# Patient Record
Sex: FEMALE | Race: BLACK OR AFRICAN AMERICAN | NOT HISPANIC OR LATINO | Employment: OTHER | URBAN - METROPOLITAN AREA
[De-identification: names, ages, dates, MRNs, and addresses within clinical notes are randomized per-mention and may not be internally consistent; named-entity substitution may affect disease eponyms.]

---

## 2017-06-26 PROBLEM — R55 MICTURITION SYNCOPE: Status: ACTIVE | Noted: 2017-06-26

## 2017-06-26 PROBLEM — R55 VASOVAGAL SYNCOPE: Status: ACTIVE | Noted: 2017-06-26

## 2019-04-04 ENCOUNTER — PATIENT OUTREACH (OUTPATIENT)
Dept: ADMINISTRATIVE | Facility: HOSPITAL | Age: 74
End: 2019-04-04

## 2019-04-30 ENCOUNTER — OFFICE VISIT (OUTPATIENT)
Dept: FAMILY MEDICINE | Facility: CLINIC | Age: 74
End: 2019-04-30
Payer: MEDICARE

## 2019-04-30 ENCOUNTER — TELEPHONE (OUTPATIENT)
Dept: FAMILY MEDICINE | Facility: CLINIC | Age: 74
End: 2019-04-30

## 2019-04-30 ENCOUNTER — LAB VISIT (OUTPATIENT)
Dept: LAB | Facility: HOSPITAL | Age: 74
End: 2019-04-30
Attending: FAMILY MEDICINE
Payer: MEDICARE

## 2019-04-30 VITALS
SYSTOLIC BLOOD PRESSURE: 190 MMHG | HEIGHT: 66 IN | WEIGHT: 152.31 LBS | DIASTOLIC BLOOD PRESSURE: 112 MMHG | TEMPERATURE: 97 F | BODY MASS INDEX: 24.48 KG/M2 | RESPIRATION RATE: 20 BRPM

## 2019-04-30 DIAGNOSIS — E78.5 HLD (HYPERLIPIDEMIA): ICD-10-CM

## 2019-04-30 DIAGNOSIS — I10 HYPERTENSION, UNSPECIFIED TYPE: ICD-10-CM

## 2019-04-30 DIAGNOSIS — E27.9 ADRENAL ABNORMALITY: ICD-10-CM

## 2019-04-30 DIAGNOSIS — Z78.0 MENOPAUSE: ICD-10-CM

## 2019-04-30 DIAGNOSIS — I10 ESSENTIAL HYPERTENSION: ICD-10-CM

## 2019-04-30 DIAGNOSIS — I95.1 ORTHOSTATIC HYPOTENSION: ICD-10-CM

## 2019-04-30 DIAGNOSIS — J45.21 ASTHMA, CHRONIC, MILD INTERMITTENT, WITH ACUTE EXACERBATION: ICD-10-CM

## 2019-04-30 DIAGNOSIS — G90.3 ORTHOSTATIC HYPOTENSION DUE TO PARKINSON'S DISEASE: ICD-10-CM

## 2019-04-30 DIAGNOSIS — G20.A1 PARKINSON DISEASE: ICD-10-CM

## 2019-04-30 DIAGNOSIS — R79.9 ABNORMAL FINDING OF BLOOD CHEMISTRY: ICD-10-CM

## 2019-04-30 DIAGNOSIS — E27.9 ADRENAL ABNORMALITY: Primary | ICD-10-CM

## 2019-04-30 LAB
ALBUMIN SERPL BCP-MCNC: 4 G/DL (ref 3.5–5.2)
ALP SERPL-CCNC: 64 U/L (ref 55–135)
ALT SERPL W/O P-5'-P-CCNC: <5 U/L (ref 10–44)
ANION GAP SERPL CALC-SCNC: 11 MMOL/L (ref 8–16)
AST SERPL-CCNC: 16 U/L (ref 10–40)
BASOPHILS # BLD AUTO: 0.04 K/UL (ref 0–0.2)
BASOPHILS NFR BLD: 0.7 % (ref 0–1.9)
BILIRUB SERPL-MCNC: 0.4 MG/DL (ref 0.1–1)
BUN SERPL-MCNC: 22 MG/DL (ref 8–23)
CALCIUM SERPL-MCNC: 9.5 MG/DL (ref 8.7–10.5)
CHLORIDE SERPL-SCNC: 106 MMOL/L (ref 95–110)
CHOLEST SERPL-MCNC: 205 MG/DL (ref 120–199)
CHOLEST/HDLC SERPL: 3.1 {RATIO} (ref 2–5)
CO2 SERPL-SCNC: 23 MMOL/L (ref 23–29)
CREAT SERPL-MCNC: 1 MG/DL (ref 0.5–1.4)
DIFFERENTIAL METHOD: NORMAL
EOSINOPHIL # BLD AUTO: 0.1 K/UL (ref 0–0.5)
EOSINOPHIL NFR BLD: 2 % (ref 0–8)
ERYTHROCYTE [DISTWIDTH] IN BLOOD BY AUTOMATED COUNT: 14.3 % (ref 11.5–14.5)
ERYTHROCYTE [SEDIMENTATION RATE] IN BLOOD BY WESTERGREN METHOD: 6 MM/HR (ref 0–36)
EST. GFR  (AFRICAN AMERICAN): >60 ML/MIN/1.73 M^2
EST. GFR  (NON AFRICAN AMERICAN): 55.2 ML/MIN/1.73 M^2
ESTIMATED AVG GLUCOSE: 111 MG/DL (ref 68–131)
GLUCOSE SERPL-MCNC: 82 MG/DL (ref 70–110)
HBA1C MFR BLD HPLC: 5.5 % (ref 4–5.6)
HCT VFR BLD AUTO: 38 % (ref 37–48.5)
HDLC SERPL-MCNC: 67 MG/DL (ref 40–75)
HDLC SERPL: 32.7 % (ref 20–50)
HGB BLD-MCNC: 12.4 G/DL (ref 12–16)
IMM GRANULOCYTES # BLD AUTO: 0.01 K/UL (ref 0–0.04)
IMM GRANULOCYTES NFR BLD AUTO: 0.2 % (ref 0–0.5)
LDLC SERPL CALC-MCNC: 123.6 MG/DL (ref 63–159)
LYMPHOCYTES # BLD AUTO: 2.1 K/UL (ref 1–4.8)
LYMPHOCYTES NFR BLD: 36.9 % (ref 18–48)
MCH RBC QN AUTO: 30 PG (ref 27–31)
MCHC RBC AUTO-ENTMCNC: 32.6 G/DL (ref 32–36)
MCV RBC AUTO: 92 FL (ref 82–98)
MONOCYTES # BLD AUTO: 0.6 K/UL (ref 0.3–1)
MONOCYTES NFR BLD: 10.1 % (ref 4–15)
NEUTROPHILS # BLD AUTO: 2.8 K/UL (ref 1.8–7.7)
NEUTROPHILS NFR BLD: 50.1 % (ref 38–73)
NONHDLC SERPL-MCNC: 138 MG/DL
NRBC BLD-RTO: 0 /100 WBC
PLATELET # BLD AUTO: 243 K/UL (ref 150–350)
PMV BLD AUTO: 10.4 FL (ref 9.2–12.9)
POTASSIUM SERPL-SCNC: 4.2 MMOL/L (ref 3.5–5.1)
PROT SERPL-MCNC: 7.5 G/DL (ref 6–8.4)
RBC # BLD AUTO: 4.13 M/UL (ref 4–5.4)
SODIUM SERPL-SCNC: 140 MMOL/L (ref 136–145)
TRIGL SERPL-MCNC: 72 MG/DL (ref 30–150)
TSH SERPL DL<=0.005 MIU/L-ACNC: 1.35 UIU/ML (ref 0.4–4)
WBC # BLD AUTO: 5.55 K/UL (ref 3.9–12.7)

## 2019-04-30 PROCEDURE — 1100F PTFALLS ASSESS-DOCD GE2>/YR: CPT | Mod: CPTII,S$GLB,, | Performed by: FAMILY MEDICINE

## 2019-04-30 PROCEDURE — 80061 LIPID PANEL: CPT

## 2019-04-30 PROCEDURE — 85025 COMPLETE CBC W/AUTO DIFF WBC: CPT

## 2019-04-30 PROCEDURE — 1100F PR PT FALLS ASSESS DOC 2+ FALLS/FALL W/INJURY/YR: ICD-10-PCS | Mod: CPTII,S$GLB,, | Performed by: FAMILY MEDICINE

## 2019-04-30 PROCEDURE — 3077F SYST BP >= 140 MM HG: CPT | Mod: CPTII,S$GLB,, | Performed by: FAMILY MEDICINE

## 2019-04-30 PROCEDURE — 82533 TOTAL CORTISOL: CPT

## 2019-04-30 PROCEDURE — 3080F DIAST BP >= 90 MM HG: CPT | Mod: CPTII,S$GLB,, | Performed by: FAMILY MEDICINE

## 2019-04-30 PROCEDURE — 80053 COMPREHEN METABOLIC PANEL: CPT

## 2019-04-30 PROCEDURE — 99213 PR OFFICE/OUTPT VISIT, EST, LEVL III, 20-29 MIN: ICD-10-PCS | Mod: S$GLB,,, | Performed by: FAMILY MEDICINE

## 2019-04-30 PROCEDURE — 85652 RBC SED RATE AUTOMATED: CPT

## 2019-04-30 PROCEDURE — 3288F FALL RISK ASSESSMENT DOCD: CPT | Mod: CPTII,S$GLB,, | Performed by: FAMILY MEDICINE

## 2019-04-30 PROCEDURE — 99999 PR PBB SHADOW E&M-EST. PATIENT-LVL IV: CPT | Mod: PBBFAC,,, | Performed by: FAMILY MEDICINE

## 2019-04-30 PROCEDURE — 82088 ASSAY OF ALDOSTERONE: CPT

## 2019-04-30 PROCEDURE — 3288F PR FALLS RISK ASSESSMENT DOCUMENTED: ICD-10-PCS | Mod: CPTII,S$GLB,, | Performed by: FAMILY MEDICINE

## 2019-04-30 PROCEDURE — 86038 ANTINUCLEAR ANTIBODIES: CPT

## 2019-04-30 PROCEDURE — 83036 HEMOGLOBIN GLYCOSYLATED A1C: CPT

## 2019-04-30 PROCEDURE — 3077F PR MOST RECENT SYSTOLIC BLOOD PRESSURE >= 140 MM HG: ICD-10-PCS | Mod: CPTII,S$GLB,, | Performed by: FAMILY MEDICINE

## 2019-04-30 PROCEDURE — 84244 ASSAY OF RENIN: CPT

## 2019-04-30 PROCEDURE — 99213 OFFICE O/P EST LOW 20 MIN: CPT | Mod: S$GLB,,, | Performed by: FAMILY MEDICINE

## 2019-04-30 PROCEDURE — 3080F PR MOST RECENT DIASTOLIC BLOOD PRESSURE >= 90 MM HG: ICD-10-PCS | Mod: CPTII,S$GLB,, | Performed by: FAMILY MEDICINE

## 2019-04-30 PROCEDURE — 84443 ASSAY THYROID STIM HORMONE: CPT

## 2019-04-30 PROCEDURE — 99999 PR PBB SHADOW E&M-EST. PATIENT-LVL IV: ICD-10-PCS | Mod: PBBFAC,,, | Performed by: FAMILY MEDICINE

## 2019-04-30 RX ORDER — HYDRALAZINE HYDROCHLORIDE 10 MG/1
10 TABLET, FILM COATED ORAL 3 TIMES DAILY
Qty: 90 TABLET | Refills: 11 | Status: SHIPPED | OUTPATIENT
Start: 2019-04-30 | End: 2019-05-31

## 2019-04-30 RX ORDER — ATORVASTATIN CALCIUM 20 MG/1
TABLET, FILM COATED ORAL
Qty: 30 TABLET | Refills: 6 | Status: SHIPPED | OUTPATIENT
Start: 2019-04-30 | End: 2019-12-08 | Stop reason: SDUPTHER

## 2019-04-30 RX ORDER — CARBIDOPA AND LEVODOPA 25; 100 MG/1; MG/1
TABLET ORAL
Refills: 3 | COMMUNITY
Start: 2019-02-20 | End: 2019-08-12

## 2019-04-30 NOTE — PATIENT INSTRUCTIONS
Measure bp before taking BP med if sys<140 don't take the med.  3x a day  To determine if med is needed  Treatment for Vasovagal Syncope  Vasovagal syncope is fainting caused by a complex nerve and blood vessel reaction in the body. Its the most common cause of fainting. Unlike other causes of fainting, its not a sign of a problem with the heart or brain.     How to say it  [id number]  SINK-o-pee   Types of treatment  If you have had episodes of vasovagal syncope, your health care provider may tell you how to help prevent fainting. These might include:  · Avoiding known triggers, such as standing for a long time or getting too hot  · Stopping medicines that lower blood pressure, such as diuretics  · Eating foods with more salt, to help keep up blood volume  · Drinking plenty of fluids, to maintain blood volume  · Doing moderate exercise such as lower leg strength training  · Wearing support stockings to prevent blood pooling in the legs while standing  In some case, your health care provider may prescribe a medicine to help control vasovagal syncope. Medicine may or may not work. Your health care provider may have you try one of the below:  · Alpha-1-adrenergic agonist, to increase your blood pressure such as midodrine  · Corticosteroids, to help increase your sodium and fluid levels such as fludrocortisone  · Serotonin reuptake inhibitors (SSRIs), to manage your nervous system response  If these medicines dont work for you, your health care provider may advise orthostatic training. This method uses a tilt table to gradually increase the amount of time you are upright. In rare cases you may need a cardiac pacemaker to prevent ongoing fainting.  Avoiding triggers  To help reduce the risk of fainting, try to avoid triggers such as:  · Standing for long periods  · Too much heat  · Intense emotion, such as fear  · Intense pain  · The sight of blood or a needle  · Exercising for a long time  Living with vasovagal  syncope  Try to avoid situations that put you at risk and stay well hydrated. Do not skip meals.  Watch for the warning signs of vasovagal syncope. These can include:  · Nausea  · Warm, flushed feeling  · Face that turns pale  · Sweaty palms  · Feeling dizzy  · Blurred vision  If you think you are about to faint, try one or more of these tips:  · Lie down right away.  · Prop your feet up so that they are higher than your head.  · Tense up your arms.  · Cross your legs.  If you faint, once you regain consciousness you should rest for a little while before moving around again.  Possible complications of vasovagal syncope  Fainting can be dangerous if it happens at certain times, like while driving. If you have chronic syncope that is not under control, your health care provider may advise you not to drive. This is especially important if you dont have warning signs before you faint. Talk with your health care provider about whats safe for you to do.     When to call your healthcare provider  Call your health care provider if you have fainting that occurs more often  or if you sustain significant injury from your fainting spell.  Unexplained syncope or fainting, especially in older people, can actually be signs of a serious life threatening condition such as a heart attack. Call 911 or seek immediate medical care if the cause of syncope is not known.  Do not drive yourself to the emergency department if you have had a syncopal episode to prevent injury to yourself or other passengers or drivers in the event another episode occurs while you are behind the wheel of a car.   Date Last Reviewed: 2/1/2017 © 2000-2017 [employer]. [address] [place] [address], [place] [address]. All rights reserved. This information is not intended as a substitute for professional medical care. Always follow your healthcare professional's instructions.        Understanding Vasovagal Syncope  Vasovagal syncope is fainting caused by  a complex nerve and blood vessel reaction in the body. Its the most common cause of fainting. Unlike other causes of fainting, its not a sign of a problem with the heart or brain.     How to say it  [id number]-trino  SINK-o-pee   How vasovagal syncope happens  Many nerves connect with your heart and blood vessels. These nerves help control the speed and force of your heartbeat. They also regulate blood pressure. They control whether your blood vessels should be more open or more closed.  Usually these nerves work together so you always get enough blood to your brain. In certain cases, these nerves may give a wrong signal. This may cause your blood vessels to open wide. At the same time, your heartbeat slows down. Blood can start to pool in your legs, and not enough of it may reach the brain. If that happens, you may briefly lose consciousness. When you lie down or fall down, blood flow to the brain resumes.  What causes vasovagal syncope?  Many triggers can cause vasovagal syncope, such as:  · Standing for long periods  · Too much heat  · Intense emotion, such as fear  · Intense pain  · The sight of blood or a needle  · Exercising for a long time  Older adults may have additional triggers, such as:  · Urinating  · Swallowing  · Coughing  · Having a bowel movement  Symptoms of vasovagal syncope  Fainting is the main symptom of vasovagal syncope. You may have symptoms before fainting such as:  · Nausea  · Warm, flushed feeling  · Face that turns pale  · Sweaty palms  · Feeling dizzy  · Blurred vision  If you lie down at the first sign of these symptoms, you will often be able to prevent fainting. Not everyone notices symptoms before fainting, however.  When a person does faint, lying down restores blood flow to the brain. Consciousness should return fairly quickly. You might not feel normal for a little while after you faint. You might feel depressed or fatigued for a short time. You may even feel nauseous afterwards  and vomit.  Some people have only 1 or 2 episodes of vasovagal syncope in their life. For others, it happens more often and with no warning.  Diagnosing vasovagal syncope  Your healthcare provider will ask about your health history and your symptoms. He or she will give you a physical exam. Your blood pressure may be measured while lying down, seated, and standing. You may also have an electrocardiogram (ECG). This is a simple test that looks at the hearts rhythm.  You may be checked for other possible causes of fainting. You may have other tests such as:  · Continuous portable ECG monitoring, to look at heart rhythms over time, such as with a holter or event monitor  · Echocardiogram, to look at the blood flow in the heart and the hearts motion  · Exercise stress testing, to see how your heart does during exercise  · Blood tests to check for signs of disease  If these tests are normal, you may have a tilt table test. For this test, you lie down on a platform. Your heart rate and blood pressure are measured while you are lying down. The platform is then tilted upright. Your heart rate and blood pressure are measured again. If you have vasovagal syncope, you may faint during the upward tilt. Sometimes medicines that increase heart rate and the force of heart contractions are used to try and provoke a syncopal episode.  There are many causes of syncope. Some causes are not dangerous. In older persons, unexplained syncope can be a sign of a serious infection or a heart attack. Call 911 or seek immediate medical attention to be evaluated, especially if there has been a fall and injury with the syncope. You should not drive yourself to the hospital or emergency department after a syncopal episode for the safety of yourself or other drivers and passengers. Have someone drive you. Your provider may restrict your driving until the cause of the syncope is better understood and to make sure the syncope does not become  chronic or if it is unpredictable.  Date Last Reviewed: 3/1/2017  © 3667-4915 [email or web address]. [address] [place] [address], [place] [address]. All rights reserved. This information is not intended as a substitute for professional medical care. Always follow your healthcare professional's instructions.

## 2019-04-30 NOTE — TELEPHONE ENCOUNTER
Please call pt to schedule with Endocrinology, Neurology, DEXA Scan, and a 2 month follow up with Dr. James.

## 2019-05-01 LAB
ANA SER QL IF: NORMAL
CORTIS SERPL-MCNC: 3.2 UG/DL

## 2019-05-01 RX ORDER — ATORVASTATIN CALCIUM 20 MG/1
TABLET, FILM COATED ORAL
Qty: 30 TABLET | Refills: 6 | Status: CANCELLED | OUTPATIENT
Start: 2019-05-01

## 2019-05-01 NOTE — PROGRESS NOTES
Madeline Reilly is a 73 y.o. female   Routine physical  Source of history: Patient  Past Medical History:   Diagnosis Date    Asthma, chronic     Orthostatic hypotension      Patient  reports that she has never smoked. She has never used smokeless tobacco. She reports that she does not drink alcohol or use drugs.  No family history on file.  ROS:  GENERAL: No fever, chills, fatigability or weight loss.  SKIN: No rashes, itching or changes in color or texture of skin.  HEAD: No headaches or recent head trauma.  EYES: Visual acuity fine. No photophobia, ocular pain or diplopia.  EARS: Denies ear pain, discharge or vertigo.  NOSE: No loss of smell, no epistaxis or postnasal drip.  MOUTH & THROAT: No hoarseness or change in voice. No excessive gum bleeding.  NODES: Denies swollen glands.  CHEST: Denies PERRY, cyanosis, wheezing, cough and sputum production.  CARDIOVASCULAR: Denies chest pain, PND, orthopnea or reduced exercise tolerance.  ABDOMEN: Appetite fine. No weight loss. Denies diarrhea, abdominal pain, hematemesis or blood in stool.  URINARY: No flank pain, dysuria or hematuria.  PERIPHERAL VASCULAR: No claudication or cyanosis.  MUSCULOSKELETAL: No joint stiffness or swelling. Denies back pain.  NEUROLOGIC: No history of seizures, paralysis, alteration of gait or coordination.    OBJECTIVE:  APPEARANCE: normal appearance  Vitals:    04/30/19 1535   BP: (!) 190/112   Resp: 20   Temp: 97.4 °F (36.3 °C)     SKIN: Normal skin turgor, no lesions.  HEENT: Both external auditory canals clear. Both tympanic membranes intact. PERRL. EOMI.   Disk margins sharp. No tonsillar enlargement. No pharyngeal erythema or exudate. No stridor.  NECK: No bruits. No cervical spine tenderness. No cervical lymphadenopathy. No thyromegaly.  NODES: No cervical, axillary or inguinal lymph node enlargement.  CHEST: Breath sounds clear bilaterally. Lungs clear to auscultation & percussion  . Good air movement. No rales. No retractions. No  rhonchi. No stridor. No wheezes.  CARDIOVASCULAR: Normal S1, S2. No murmurs. No edema.  BREASTS: no masses palpated in either breast or axillary area, symmetry noted.  ABDOMEN: Bowel sounds normal. No palpable aortic enlargement. No CVA tenderness  . No pulsatile mass. No rebound tenderness.  PERIPHERAL VASCULAR: Femoral pulses present and symmetrical. No edema.  MUSCULOSKELETAL: Degenerative changes of both ankles, foot, knee, wrist and hand.  BACK: No CVA tenderness. There is no spasm, tenderness or radiculopathy noted with   palpation and there is full range of motion.   NEUROLOGIC:   Cranial Nerves: II-XII grossly intact.  Motor: 5/5 strength major flexors/extensors. No tremor.  DTR's: Knees, Ankles 2+ and equal bilaterally; downgoing toes.  Sensory: Intact to light touch distally.  Gait & Posture: Normal gait and fine motion. No cerebellar signs.  MENTAL STATUS: Alert. Oriented x 3. Language skills normal.   Memory intact. No suicidal ideation. Normal affect. Well kept appearance.    ASSESSMENT/PLAN:   Madeline was seen today for establish care.    Diagnoses and all orders for this visit:    Adrenal abnormality  -     Ambulatory consult to Endocrinology  -     CBC auto differential; Future  -     Comprehensive metabolic panel; Future  -     Cortisol; Future  -     RENIN; Future  -     Aldosterone; Future  -     ZAK Screen w/Reflex; Future    Hypertension, unspecified type  -     Hemoglobin A1c; Future  -     Lipid panel; Future  -     TSH; Future  -     hydrALAZINE (APRESOLINE) 10 MG tablet; Take 1 tablet (10 mg total) by mouth 3 (three) times daily.    Abnormal finding of blood chemistry   -     Hemoglobin A1c; Future  -     Cancel: ZAK Screen w/Reflex; Future  -     Sedimentation rate; Future    Orthostatic hypotension due to Parkinson's disease  -     Ambulatory consult to Neurology    Parkinson disease  -     Ambulatory consult to Neurology    Menopause  -     DXA Bone Density Spine And Hip; Future    HLD  (hyperlipidemia)  -     atorvastatin (LIPITOR) 20 MG tablet; TAKE 1 TABLET(20 MG) BY MOUTH EVERY DAY    Orthostatic hypotension  -     atorvastatin (LIPITOR) 20 MG tablet; TAKE 1 TABLET(20 MG) BY MOUTH EVERY DAY    Essential hypertension  -     atorvastatin (LIPITOR) 20 MG tablet; TAKE 1 TABLET(20 MG) BY MOUTH EVERY DAY    Asthma, chronic, mild intermittent, with acute exacerbation  -     atorvastatin (LIPITOR) 20 MG tablet; TAKE 1 TABLET(20 MG) BY MOUTH EVERY DAY    will contact pt with results when available.

## 2019-05-02 LAB — ALDOST SERPL-MCNC: 3.1 NG/DL

## 2019-05-07 ENCOUNTER — TELEPHONE (OUTPATIENT)
Dept: PRIMARY CARE CLINIC | Facility: CLINIC | Age: 74
End: 2019-05-07

## 2019-05-07 DIAGNOSIS — T50.901A MEDICATION ADMINISTERED IN ERROR, ACCIDENTAL OR UNINTENTIONAL, INITIAL ENCOUNTER: Primary | ICD-10-CM

## 2019-05-07 LAB — RENIN PLAS-CCNC: <0.6 NG/ML/H

## 2019-05-07 NOTE — TELEPHONE ENCOUNTER
Patient is unclear on what to do for her BP. She has stopped the Sinemet per Dr. James & been taking the Hydralazine. Patient has been experiencing nausea & significantly fluctuating BP since changing her medication. Please call the patient to discuss.

## 2019-05-07 NOTE — TELEPHONE ENCOUNTER
Pt had stopped all her medications except the hydralizine. Told pt to restart her medications except norvasc 2.5mg keep a good record of her bp's .  She is aware that home health nurse will be coming out to review her medications. Pt told to take hydralazine bid until we see what her bp's are running and we can adjust from there. She will hold bp med for sys <135.

## 2019-05-07 NOTE — TELEPHONE ENCOUNTER
----- Message from Ban Downing sent at 5/7/2019  9:36 AM CDT -----  Contact: [phone]  Patient is requesting a call from the office concerning her blood pressure. Her reading today is 114/74.   Please advise, thanks

## 2019-05-07 NOTE — TELEPHONE ENCOUNTER
I told this lady to stop the amliodipine 2.5 mg per day. I never told her to stop her parkinsons meds.  Then I prescribed hydralizine. She needs to restart her parkinsons meds stop amliodipine  and monitor her  Bp's using the the hydraizine at least 2 times per day. Maybe I need to do a nurse home health to explain this  Lady's medications. See referral.

## 2019-05-08 ENCOUNTER — TELEPHONE (OUTPATIENT)
Dept: PRIMARY CARE CLINIC | Facility: CLINIC | Age: 74
End: 2019-05-08

## 2019-05-08 NOTE — TELEPHONE ENCOUNTER
----- Message from Elizabeth Goddard sent at 5/8/2019  4:12 PM CDT -----  Contact: Althea   Althea would like a call back concerning the patients home health agency.

## 2019-05-08 NOTE — TELEPHONE ENCOUNTER
----- Message from Maribel Cross sent at 5/8/2019 12:44 PM CDT -----  Contact: patient [phone]  Patient called to ask if  is still planning to send a nurse out to her home as you discussed yesterday.

## 2019-05-09 ENCOUNTER — TELEPHONE (OUTPATIENT)
Dept: PRIMARY CARE CLINIC | Facility: CLINIC | Age: 74
End: 2019-05-09

## 2019-05-09 NOTE — TELEPHONE ENCOUNTER
----- Message from Kavon Montes sent at 5/9/2019  1:00 PM CDT -----  Contact: [facility] [phone]   [facility] expedition and service are approved, Patient ID [id number], Authorization number [id number]

## 2019-05-17 ENCOUNTER — PATIENT MESSAGE (OUTPATIENT)
Dept: PRIMARY CARE CLINIC | Facility: CLINIC | Age: 74
End: 2019-05-17

## 2019-05-17 ENCOUNTER — TELEPHONE (OUTPATIENT)
Dept: PRIMARY CARE CLINIC | Facility: CLINIC | Age: 74
End: 2019-05-17

## 2019-05-17 DIAGNOSIS — R29.6 FREQUENT FALLS: Primary | ICD-10-CM

## 2019-05-17 NOTE — TELEPHONE ENCOUNTER
----- Message from Kamini Gloria sent at 5/17/2019  9:36 AM CDT -----  Contact: Nargis vines/[facility]   Nargis from [facility] states pt has had 2 falls within the last 24 hrs. She would like a physical therapy authorization for PT & OT  Fax number is

## 2019-05-17 NOTE — TELEPHONE ENCOUNTER
Patient advised that the PT & OT order is being sent, and that Dr. James would like her to see Neurology. Patient has an appointment scheduled but would like to try to get in sooner. Please contact the pt about a sooner apt if possible.

## 2019-05-20 ENCOUNTER — TELEPHONE (OUTPATIENT)
Dept: PRIMARY CARE CLINIC | Facility: CLINIC | Age: 74
End: 2019-05-20

## 2019-05-20 NOTE — TELEPHONE ENCOUNTER
----- Message from Ann Alvarez sent at 5/20/2019  2:37 PM CDT -----  Contact: Linda/ [facility]/ [phone]  Needing the order for the P.T./ O.T  for patient , please fax orders to

## 2019-05-24 ENCOUNTER — TELEPHONE (OUTPATIENT)
Dept: ADMINISTRATIVE | Facility: CLINIC | Age: 74
End: 2019-05-24

## 2019-05-24 ENCOUNTER — PATIENT MESSAGE (OUTPATIENT)
Dept: PRIMARY CARE CLINIC | Facility: CLINIC | Age: 74
End: 2019-05-24

## 2019-05-24 ENCOUNTER — TELEPHONE (OUTPATIENT)
Dept: PRIMARY CARE CLINIC | Facility: CLINIC | Age: 74
End: 2019-05-24

## 2019-05-24 DIAGNOSIS — I10 HYPERTENSION, UNSPECIFIED TYPE: Primary | ICD-10-CM

## 2019-05-24 RX ORDER — AMLODIPINE BESYLATE 10 MG/1
10 TABLET ORAL DAILY
Qty: 30 TABLET | Refills: 11 | Status: SHIPPED | OUTPATIENT
Start: 2019-05-24 | End: 2019-10-16

## 2019-05-24 NOTE — TELEPHONE ENCOUNTER
Home Health SOC 05/12/2019 - 07/10/2019 with [facility] ([place]) - Dr. Karen Espinosa. Patient received  services.

## 2019-05-31 ENCOUNTER — OFFICE VISIT (OUTPATIENT)
Dept: PRIMARY CARE CLINIC | Facility: CLINIC | Age: 74
End: 2019-05-31
Payer: MEDICARE

## 2019-05-31 VITALS
BODY MASS INDEX: 24.98 KG/M2 | SYSTOLIC BLOOD PRESSURE: 174 MMHG | HEART RATE: 65 BPM | HEIGHT: 66 IN | OXYGEN SATURATION: 96 % | TEMPERATURE: 98 F | DIASTOLIC BLOOD PRESSURE: 98 MMHG | WEIGHT: 155.44 LBS

## 2019-05-31 DIAGNOSIS — I10 HYPERTENSION, UNSPECIFIED TYPE: Primary | ICD-10-CM

## 2019-05-31 PROCEDURE — 99999 PR PBB SHADOW E&M-EST. PATIENT-LVL III: CPT | Mod: PBBFAC,,, | Performed by: FAMILY MEDICINE

## 2019-05-31 PROCEDURE — 99999 PR PBB SHADOW E&M-EST. PATIENT-LVL III: ICD-10-PCS | Mod: PBBFAC,,, | Performed by: FAMILY MEDICINE

## 2019-05-31 PROCEDURE — 99213 PR OFFICE/OUTPT VISIT, EST, LEVL III, 20-29 MIN: ICD-10-PCS | Mod: S$GLB,,, | Performed by: FAMILY MEDICINE

## 2019-05-31 PROCEDURE — 99499 UNLISTED E&M SERVICE: CPT | Mod: S$GLB,,, | Performed by: FAMILY MEDICINE

## 2019-05-31 PROCEDURE — 99213 OFFICE O/P EST LOW 20 MIN: CPT | Mod: S$GLB,,, | Performed by: FAMILY MEDICINE

## 2019-05-31 PROCEDURE — 99499 RISK ADDL DX/OHS AUDIT: ICD-10-PCS | Mod: S$GLB,,, | Performed by: FAMILY MEDICINE

## 2019-05-31 RX ORDER — CLONIDINE 0.1 MG/24H
1 PATCH, EXTENDED RELEASE TRANSDERMAL
Qty: 4 PATCH | Refills: 11 | Status: SHIPPED | OUTPATIENT
Start: 2019-05-31 | End: 2019-09-10

## 2019-06-03 NOTE — PROGRESS NOTES
Transitional Care Note  Subjective:       Patient ID: Madeline Reilly is a 73 y.o. female.  Chief Complaint: Transitional Care (hospital follow up)  pt had admission of labile htn  Family and/or Caretaker present at visit?  No.  Diagnostic tests reviewed/disposition: No diagnosic tests pending after this hospitalization.  Disease/illness education:  parkinsons disease, labile htn, adrenal issues  Home health/community services discussion/referrals: Patient has home health established at  [facility].   Establishment or re-establishment of referral orders for community resources: No other necessary community resources.   Discussion with other health care providers: No discussion with other health care providers necessary.   Pt has seen cardiology in the hospital. Pending appts with neurology and endocrine consults.  HPIsee above.  Review of Systems   Constitutional: Negative.    HENT: Negative.    Eyes: Negative.    Respiratory: Negative.    Cardiovascular:        Labile htn   Gastrointestinal: Negative.    Endocrine: Negative.    Genitourinary: Negative.    Musculoskeletal: Negative.    Skin: Negative.    Allergic/Immunologic: Negative.    Neurological: Negative.    Hematological: Negative.    Psychiatric/Behavioral: Negative.        Objective:      Physical Exam   Constitutional: She is oriented to person, place, and time. She appears well-developed and well-nourished. No distress.   HENT:   Head: Normocephalic and atraumatic.   Right Ear: External ear normal.   Left Ear: External ear normal.   Nose: Nose normal.   Mouth/Throat: Oropharynx is clear and moist.   Eyes: Pupils are equal, round, and reactive to light. Conjunctivae and EOM are normal. Right eye exhibits no discharge. No scleral icterus.   Neck: Normal range of motion. Neck supple. No JVD present.   Cardiovascular: Normal rate, regular rhythm, normal heart sounds and intact distal pulses.   No murmur heard.  Pulmonary/Chest: Effort normal and  breath sounds normal. No stridor. No respiratory distress. She has no wheezes. She has no rales. She exhibits no tenderness.   Abdominal: Soft. Bowel sounds are normal. She exhibits no distension and no mass. There is no tenderness. There is no rebound and no guarding.   Musculoskeletal: Normal range of motion. She exhibits no edema.   Neurological: She is alert and oriented to person, place, and time. She displays normal reflexes. No cranial nerve deficit or sensory deficit. She exhibits normal muscle tone. Coordination normal.   Skin: Skin is warm and dry. No rash noted. She is not diaphoretic. No erythema. No pallor.   Psychiatric: She has a normal mood and affect. Her behavior is normal. Judgment and thought content normal.   Nursing note and vitals reviewed.      Assessment:       1. Hypertension, unspecified type     2.     Adrenal abnormality  3.      Parkinson's disease  Plan:     see orders dated 5-31-19   Hypertension e-SENS Medicine ([facility]) Enrollment Order         Hypertension Digital Medicine ([facility]): Assign Onboarding Questionnaires          See med card dated 5-31-19   fexofenadine (ALLEGRA) 60 MG tablet 60 mg, Once as needed       Antihyperlipidemic - HMG CoA Reductase Inhibitors (statins)    atorvastatin (LIPITOR) 20 MG tablet        Antiparkinson - Dopaminerg-Peripheral Dopa-decarboxylase Inhibit Comb    carbidopa-levodopa  mg (SINEMET)  mg per tablet        Asthma/COPD Therapy - Beta 2-Adrenergic Agents, Inhaled, Short Acting    ALBUTEROL SULFATE (VENTOLIN INHL) Every 4 hours PRN       Asthma/COPD Therapy - Beta Adrenergic-Glucocorticoid Combinations    ADVAIR DISKUS 500-50 mcg/dose DsDv diskus inhaler        Calcium Channel Blockers - Dihydropyridines    amLODIPine (NORVASC) 10 MG tablet 10 mg, Daily       Central Alpha-2 Receptor Agonists    cloNIDine 0.1 mg/24 hr td ptwk (CATAPRES) 0.1 mg/24 hr 1 patch, Every 7 days       Direct Acting Vasodilators        Gastric Acid Secretion  Reducing Agents - Proton Pump Inhibitors (PPIs)    pantoprazole (PROTONIX) 40 MG tablet 40 mg, Daily        Patient taking differently: 40 mg Oral Once as needed, Reported on 3/16/2016      Gastric Acid Secretion Reducing-H2 Antagonist and Antacid Combinations    famotidine-calcium carbonate-magnesium hydroxide (PEPCID COMPLETE) chewable tablet 1 tablet       Mineralocorticoids   Minerals and Electrolytes - Calcium Replacement/Vitamin D Combinations    calcium-vitamin D 500-125 mg-unit tablet 1 tablet, Daily       Nasal Anticholinergics    ipratropium (ATROVENT) 0.06 % nasal spray        Nasal Antihistamines    azelastine (ASTELIN) 137 mcg (0.1 %) nasal spray        NSAID Analgesics (DUPONT Non-Specific) - Propionic Acid Derivatives    naproxen (NAPROSYN) 500 MG tablet 500 mg, 2 times daily with meals

## 2019-06-07 ENCOUNTER — NURSE TRIAGE (OUTPATIENT)
Dept: ADMINISTRATIVE | Facility: CLINIC | Age: 74
End: 2019-06-07

## 2019-06-07 NOTE — TELEPHONE ENCOUNTER
Reason for Disposition   Systolic BP < 90 and feeling dizzy, lightheaded, or weak    Protocols used: LOW BLOOD PRESSURE-A-OH    -B/p 88/46 pulse 76 taken by home health nurse now  -Very dizzy, unsteady on her feet opening the door to let the home health nurse in  -B/p varying from very high to very low x 2 weeks >200's systolic (240) to 90's systolic. Nurse states she is following instructions from Dr. Abdi at last visit.  -recommended 911 now.  -Please call patient if any other additional recs going forward with meds-her f/u is 7/2.

## 2019-07-02 ENCOUNTER — OFFICE VISIT (OUTPATIENT)
Dept: PRIMARY CARE CLINIC | Facility: CLINIC | Age: 74
End: 2019-07-02
Payer: MEDICARE

## 2019-07-02 VITALS
TEMPERATURE: 98 F | DIASTOLIC BLOOD PRESSURE: 72 MMHG | WEIGHT: 152.13 LBS | BODY MASS INDEX: 24.45 KG/M2 | HEART RATE: 70 BPM | SYSTOLIC BLOOD PRESSURE: 155 MMHG | HEIGHT: 66 IN

## 2019-07-02 DIAGNOSIS — K21.9 GASTROESOPHAGEAL REFLUX DISEASE, ESOPHAGITIS PRESENCE NOT SPECIFIED: Primary | ICD-10-CM

## 2019-07-02 DIAGNOSIS — R19.7 DIARRHEA, UNSPECIFIED TYPE: ICD-10-CM

## 2019-07-02 PROCEDURE — 99214 OFFICE O/P EST MOD 30 MIN: CPT | Mod: S$GLB,,, | Performed by: FAMILY MEDICINE

## 2019-07-02 PROCEDURE — 99999 PR PBB SHADOW E&M-EST. PATIENT-LVL III: ICD-10-PCS | Mod: PBBFAC,,, | Performed by: FAMILY MEDICINE

## 2019-07-02 PROCEDURE — 3078F DIAST BP <80 MM HG: CPT | Mod: CPTII,S$GLB,, | Performed by: FAMILY MEDICINE

## 2019-07-02 PROCEDURE — 99999 PR PBB SHADOW E&M-EST. PATIENT-LVL III: CPT | Mod: PBBFAC,,, | Performed by: FAMILY MEDICINE

## 2019-07-02 PROCEDURE — 1101F PT FALLS ASSESS-DOCD LE1/YR: CPT | Mod: CPTII,S$GLB,, | Performed by: FAMILY MEDICINE

## 2019-07-02 PROCEDURE — 3078F PR MOST RECENT DIASTOLIC BLOOD PRESSURE < 80 MM HG: ICD-10-PCS | Mod: CPTII,S$GLB,, | Performed by: FAMILY MEDICINE

## 2019-07-02 PROCEDURE — 3077F SYST BP >= 140 MM HG: CPT | Mod: CPTII,S$GLB,, | Performed by: FAMILY MEDICINE

## 2019-07-02 PROCEDURE — 99214 PR OFFICE/OUTPT VISIT, EST, LEVL IV, 30-39 MIN: ICD-10-PCS | Mod: S$GLB,,, | Performed by: FAMILY MEDICINE

## 2019-07-02 PROCEDURE — 3077F PR MOST RECENT SYSTOLIC BLOOD PRESSURE >= 140 MM HG: ICD-10-PCS | Mod: CPTII,S$GLB,, | Performed by: FAMILY MEDICINE

## 2019-07-02 PROCEDURE — 1101F PR PT FALLS ASSESS DOC 0-1 FALLS W/OUT INJ PAST YR: ICD-10-PCS | Mod: CPTII,S$GLB,, | Performed by: FAMILY MEDICINE

## 2019-07-02 RX ORDER — LOPERAMIDE HCL 2 MG
2 TABLET ORAL 4 TIMES DAILY PRN
Qty: 30 TABLET | Refills: 12 | Status: SHIPPED | OUTPATIENT
Start: 2019-07-02 | End: 2019-07-12

## 2019-07-05 NOTE — PROGRESS NOTES
Subjective:       Patient ID: Madeline Reilly is a 73 y.o. female.    Chief Complaint: Hypertension (follow up)   Patient's blood pressure seems to be more stabilized has had no more fainting episodes  Patient is awaiting neurology appointment to be re-evaluated for Parkinson's disease  Patient claims that her blood pressures run much lower at home  Patient is on outside cardiologist who recommended not increasing her blood pressure medications any higher  HPI see above  Review of Systems   Constitutional: Negative.    Eyes: Negative.    Respiratory: Negative.    Cardiovascular:        Elevated blood pressure 's labile   Gastrointestinal: Negative.    Endocrine: Negative.    Genitourinary: Negative.    Musculoskeletal: Negative.    Skin: Negative.    Allergic/Immunologic: Negative.    Neurological: Negative.    Hematological: Negative.    Psychiatric/Behavioral: Negative.        Objective:      Physical Exam   Constitutional: She is oriented to person, place, and time. She appears well-developed and well-nourished. No distress.   HENT:   Head: Normocephalic and atraumatic.   Right Ear: External ear normal.   Left Ear: External ear normal.   Nose: Nose normal.   Mouth/Throat: Oropharynx is clear and moist.   Eyes: Pupils are equal, round, and reactive to light. Conjunctivae and EOM are normal.   Neck: Normal range of motion. Neck supple. No JVD present. No thyromegaly present.   Cardiovascular: Normal rate, regular rhythm, normal heart sounds and intact distal pulses.   No murmur heard.  Pulmonary/Chest: Effort normal and breath sounds normal. No respiratory distress.   Abdominal: Soft. Bowel sounds are normal. She exhibits no distension. There is no tenderness. There is no guarding.   Musculoskeletal: Normal range of motion. She exhibits no edema or deformity.   Neurological: She is alert and oriented to person, place, and time. She displays normal reflexes. No cranial nerve deficit or sensory deficit. She exhibits  normal muscle tone. Coordination normal.   Skin: Skin is warm and dry. Capillary refill takes less than 2 seconds. No rash noted. She is not diaphoretic. No erythema. No pallor.   Psychiatric: She has a normal mood and affect. Her behavior is normal. Judgment and thought content normal.   Nursing note and vitals reviewed.      Assessment:       1. Gastroesophageal reflux disease, esophagitis presence not specified    2. Diarrhea, unspecified type        Plan:     see med card dated 07/03/2019   loperamide (IMODIUM A-D) 2 mg Tab 2 mg, 4 times daily PRN       Antihistamines - 2nd Generation, Antihistamines - 2nd Generation - Piperidines    fexofenadine (ALLEGRA) 60 MG tablet 60 mg, Once as needed       Antihyperlipidemic - HMG CoA Reductase Inhibitors (statins)    atorvastatin (LIPITOR) 20 MG tablet        Antiparkinson - Dopaminerg-Peripheral Dopa-decarboxylase Inhibit Comb    carbidopa-levodopa  mg (SINEMET)  mg per tablet        Asthma/COPD Therapy - Beta 2-Adrenergic Agents, Inhaled, Short Acting    ALBUTEROL SULFATE (VENTOLIN INHL) Every 4 hours PRN       Asthma/COPD Therapy - Beta Adrenergic-Glucocorticoid Combinations    ADVAIR DISKUS 500-50 mcg/dose DsDv diskus inhaler        Calcium Channel Blockers - Dihydropyridines    amLODIPine (NORVASC) 10 MG tablet 10 mg, Daily       Central Alpha-2 Receptor Agonists    cloNIDine 0.1 mg/24 hr td ptwk (CATAPRES) 0.1 mg/24 hr 1 patch, Every 7 days       Gastric Acid Secretion Reducing Agents - Proton Pump Inhibitors (PPIs)    pantoprazole (PROTONIX) 40 MG tablet 40 mg, Daily        Patient taking differently: 40 mg Oral Once as needed, Reported on 3/16/2016      Gastric Acid Secretion Reducing-H2 Antagonist and Antacid Combinations    famotidine-calcium carbonate-magnesium hydroxide (PEPCID COMPLETE) chewable tablet 1 tablet        famotidine-calcium carbonate-magnesium hydroxide (PEPCID COMPLETE) chewable tablet 1 tablet, Daily PRN       Minerals and  Electrolytes - Calcium Replacement/Vitamin D Combinations    calcium-vitamin D 500-125 mg-unit tablet 1 tablet, Daily       Nasal Anticholinergics    ipratropium (ATROVENT) 0.06 % nasal spray        Nasal Antihistamines    azelastine (ASTELIN) 137 mcg (0.1 %) nasal spray        NSAID Analgesics (DUPONT Non-Specific) - Propionic Acid Derivatives    naproxen (NAPROSYN) 500 MG tablet 500 mg, 2 times daily with meals         Recommend patient continue to monitor blood pressures at home keep a log  Patient's son will help her to transmit these blood pressure readings to me follow-up in 1 month to re-evaluate blood pressures

## 2019-07-11 ENCOUNTER — EXTERNAL HOME HEALTH (OUTPATIENT)
Dept: HOME HEALTH SERVICES | Facility: HOSPITAL | Age: 74
End: 2019-07-11

## 2019-07-11 ENCOUNTER — TELEPHONE (OUTPATIENT)
Dept: NEUROLOGY | Facility: CLINIC | Age: 74
End: 2019-07-11

## 2019-07-11 NOTE — TELEPHONE ENCOUNTER
Left message and call back number     ----- Message from Brady Adams sent at 7/11/2019 10:08 AM CDT -----  Contact: Patient @ [phone]  Patient calling to r/s the 7-11th appt, delroy call

## 2019-08-12 ENCOUNTER — OFFICE VISIT (OUTPATIENT)
Dept: NEUROLOGY | Facility: CLINIC | Age: 74
End: 2019-08-12
Payer: MEDICARE

## 2019-08-12 VITALS
WEIGHT: 151.44 LBS | DIASTOLIC BLOOD PRESSURE: 83 MMHG | SYSTOLIC BLOOD PRESSURE: 131 MMHG | HEART RATE: 63 BPM | HEIGHT: 65 IN | BODY MASS INDEX: 25.23 KG/M2

## 2019-08-12 DIAGNOSIS — G20.C PARKINSONISM, UNSPECIFIED PARKINSONISM TYPE: ICD-10-CM

## 2019-08-12 DIAGNOSIS — R41.3 MEMORY CHANGE: Primary | ICD-10-CM

## 2019-08-12 DIAGNOSIS — I95.1 ORTHOSTATIC HYPOTENSION: ICD-10-CM

## 2019-08-12 DIAGNOSIS — I95.1 ORTHOSTATIC HYPOTENSION: Primary | ICD-10-CM

## 2019-08-12 PROCEDURE — 1101F PR PT FALLS ASSESS DOC 0-1 FALLS W/OUT INJ PAST YR: ICD-10-PCS | Mod: CPTII,S$GLB,, | Performed by: PSYCHIATRY & NEUROLOGY

## 2019-08-12 PROCEDURE — 3075F SYST BP GE 130 - 139MM HG: CPT | Mod: CPTII,S$GLB,, | Performed by: PSYCHIATRY & NEUROLOGY

## 2019-08-12 PROCEDURE — 99205 PR OFFICE/OUTPT VISIT, NEW, LEVL V, 60-74 MIN: ICD-10-PCS | Mod: S$GLB,,, | Performed by: PSYCHIATRY & NEUROLOGY

## 2019-08-12 PROCEDURE — 99205 OFFICE O/P NEW HI 60 MIN: CPT | Mod: S$GLB,,, | Performed by: PSYCHIATRY & NEUROLOGY

## 2019-08-12 PROCEDURE — 3079F PR MOST RECENT DIASTOLIC BLOOD PRESSURE 80-89 MM HG: ICD-10-PCS | Mod: CPTII,S$GLB,, | Performed by: PSYCHIATRY & NEUROLOGY

## 2019-08-12 PROCEDURE — 1101F PT FALLS ASSESS-DOCD LE1/YR: CPT | Mod: CPTII,S$GLB,, | Performed by: PSYCHIATRY & NEUROLOGY

## 2019-08-12 PROCEDURE — 99999 PR PBB SHADOW E&M-EST. PATIENT-LVL III: CPT | Mod: PBBFAC,,, | Performed by: PSYCHIATRY & NEUROLOGY

## 2019-08-12 PROCEDURE — 99499 RISK ADDL DX/OHS AUDIT: ICD-10-PCS | Mod: S$GLB,,, | Performed by: PSYCHIATRY & NEUROLOGY

## 2019-08-12 PROCEDURE — 3075F PR MOST RECENT SYSTOLIC BLOOD PRESS GE 130-139MM HG: ICD-10-PCS | Mod: CPTII,S$GLB,, | Performed by: PSYCHIATRY & NEUROLOGY

## 2019-08-12 PROCEDURE — 99499 UNLISTED E&M SERVICE: CPT | Mod: S$GLB,,, | Performed by: PSYCHIATRY & NEUROLOGY

## 2019-08-12 PROCEDURE — 99999 PR PBB SHADOW E&M-EST. PATIENT-LVL III: ICD-10-PCS | Mod: PBBFAC,,, | Performed by: PSYCHIATRY & NEUROLOGY

## 2019-08-12 PROCEDURE — 3079F DIAST BP 80-89 MM HG: CPT | Mod: CPTII,S$GLB,, | Performed by: PSYCHIATRY & NEUROLOGY

## 2019-08-12 RX ORDER — CARBIDOPA AND LEVODOPA 25; 100 MG/1; MG/1
1 TABLET ORAL 3 TIMES DAILY
Qty: 90 TABLET | Refills: 11 | Status: SHIPPED | OUTPATIENT
Start: 2019-08-12 | End: 2020-11-20

## 2019-08-12 NOTE — ASSESSMENT & PLAN NOTE
Per report 12 years of orthostasis. Currently patient records Bps at home and has rare spikes to systolic 190's. Managed by OSH physician. She seems to suffer from dizziness on standing near constantly. I suggested an abdominal binder and consult to cariology for help with management. I also instructed her that carbidopa/levodopa can have blood pressure lowering effects. Suggested raising HOB, eating smaller meals, and using ice water to help with orthostatic moments.  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

## 2019-08-12 NOTE — PROGRESS NOTES
MOVEMENT DISORDERS CLINIC NEW CONSULT NOTE    PCP/Referring Provider: Aaareferral Self  No address on file  Date of Service: 8/12/2019    Chief Complaint: PDism    HPI: Madeline Reilly is a R HANDED 73 y.o. female with a medical issues significant for HTN, Asthma, GERD, Orthostatic hypotension (for 12 years), who presents for 2nd opinion Re PDism per PCP. This year she noticed a resting tremor in her R hand. She also notes falls since march. She notes most of her falls are at night after standing and syncopizing or near-syncopizing. She's had 12 total falls. She feels dizzy on standing most times. She notes her BP can spike to the 190's rarely. She does not require a cane to walk. She can walk 2 blocks before she becomes scared of falling. Stairs are challenging to her.    She sees Dr. Abdi for BP management.    She does find herself gasping at times.  She has been taking C/L 25/100mg Po BID  No nausea    No family hx PD    PD Review of Symptoms:  Anosmia: Anosmia  Dysarthria/Hypophonia: Hoarseness for 2 months that comes and goes  Dysphagia/Sialorrhea: None  Hallucinations: 2 months at night - started before C/L and have progressed - nonfrightening  Depression: Depression since 2 months -frustrated with her body  Cognitive slowing: Memory decline since 4-5 months  Impulsivity: None  Urinary changes: frequency  Constipation: None  Orthostasis: As above  Falls: as above  Freezing: None  Micrographia: None  Sleep issues:  -ELVIA: unknown  -RBD: for 1 year   -Sleep Quality: Naps    Review of Systems:   Review of Systems   Constitutional: Negative for fever.   HENT: Negative for congestion.    Eyes: Negative for double vision.   Respiratory: Negative for cough and shortness of breath.    Cardiovascular: Negative for chest pain and leg swelling.   Gastrointestinal: Negative for nausea.   Genitourinary: Negative for dysuria.   Musculoskeletal: Positive for falls.   Skin: Negative for rash.   Neurological: Positive for  dizziness, tremors and speech change. Negative for headaches.   Psychiatric/Behavioral: Positive for depression and memory loss.       Neuroleptic exposure:  None    Current Medications:  Outpatient Encounter Medications as of 8/12/2019   Medication Sig Dispense Refill    ADVAIR DISKUS 500-50 mcg/dose DsDv diskus inhaler USE 1 INHALATION PO BID  3    ALBUTEROL SULFATE (VENTOLIN INHL) Inhale into the lungs every 4 (four) hours as needed.      amLODIPine (NORVASC) 10 MG tablet Take 1 tablet (10 mg total) by mouth once daily. 30 tablet 11    atorvastatin (LIPITOR) 20 MG tablet TAKE 1 TABLET(20 MG) BY MOUTH EVERY DAY 30 tablet 6    azelastine (ASTELIN) 137 mcg (0.1 %) nasal spray SPRAY TWICE INTO EACH NOSTRIL ONCE  Q DAY  4    calcium-vitamin D 500-125 mg-unit tablet Take 1 tablet by mouth once daily.       cloNIDine 0.1 mg/24 hr td ptwk (CATAPRES) 0.1 mg/24 hr Place 1 patch onto the skin every 7 days. 4 patch 11    famotidine-calcium carbonate-magnesium hydroxide (PEPCID COMPLETE) chewable tablet Take 1 tablet by mouth.      famotidine-calcium carbonate-magnesium hydroxide (PEPCID COMPLETE) chewable tablet Take 1 tablet by mouth daily as needed. 60 tablet 12    fexofenadine (ALLEGRA) 60 MG tablet Take 60 mg by mouth once as needed.       ipratropium (ATROVENT) 0.06 % nasal spray SPRAY TWICE IN EACH NOSTRIL BID  4    naproxen (NAPROSYN) 500 MG tablet Take 1 tablet (500 mg total) by mouth 2 (two) times daily with meals. 60 tablet 3    pantoprazole (PROTONIX) 40 MG tablet Take 1 tablet (40 mg total) by mouth once daily. (Patient taking differently: Take 40 mg by mouth once as needed. ) 30 tablet 5    [DISCONTINUED] carbidopa-levodopa  mg (SINEMET)  mg per tablet TK 1 T PO  TID  3    carbidopa-levodopa  mg (SINEMET)  mg per tablet Take 1 tablet by mouth 3 (three) times daily. 90 tablet 11     No facility-administered encounter medications on file as of 8/12/2019.        Past Medical  "History:  Patient Active Problem List   Diagnosis    Asthma, chronic    Orthostatic hypotension    Hypertension    HLD (hyperlipidemia)    Micturition syncope    Vasovagal syncope    Parkinsonism       Past Surgical History:  Past Surgical History:   Procedure Laterality Date    CATARACT EXTRACTION, BILATERAL      PARTIAL HYSTERECTOMY  1981       Current Living Situation: home    Social:  Social History     Socioeconomic History    Marital status:      Spouse name: Not on file    Number of children: Not on file    Years of education: Not on file    Highest education level: Not on file   Occupational History    Not on file   Social Needs    Financial resource strain: Not on file    Food insecurity:     Worry: Not on file     Inability: Not on file    Transportation needs:     Medical: Not on file     Non-medical: Not on file   Tobacco Use    Smoking status: Never Smoker    Smokeless tobacco: Never Used   Substance and Sexual Activity    Alcohol use: No    Drug use: No    Sexual activity: Not on file   Lifestyle    Physical activity:     Days per week: Not on file     Minutes per session: Not on file    Stress: Not on file   Relationships    Social connections:     Talks on phone: Not on file     Gets together: Not on file     Attends Mandaeism service: Not on file     Active member of club or organization: Not on file     Attends meetings of clubs or organizations: Not on file     Relationship status: Not on file   Other Topics Concern    Not on file   Social History Narrative    Retired .       Family History:  As above    PHYSICAL:  /83   Pulse 63   Ht 5' 5" (1.651 m)   Wt 68.7 kg (151 lb 7.3 oz)   BMI 25.20 kg/m²     General Medical Examination:  General: Good hygiene, appropriate appearance.  HEENT: Normocephalic, atraumatic.   Neck: Supple.   Chest: Unlabored breathing.   CV: Symmetric pulses.   Ext: No clubbing, cyanosis, or edema.     Mental " Status:  Mood/Affect: Appropriate/congruent.  Level of consciousness: Awake, alert.  Orientation: Oriented to person, place, time and situation.  Language: No Dysarthria    Cranial nerves:  I: Not tested  II: PERRL, VFF to counting  III, IV, VI: EOMI with conjugate gaze and no nystagmus on end gaze  V: Facial sensation intact and symmetric over the bilateral V1-V3  VII: Facial muscle activation intact and symmetric over the bilateral upper and lower face  VIII: Hearing intact in the b/l ears and symmetrical to finger rub  IX, X, XII: TUP midline - no atrophy or fasiculations  X: SCMs and shoulder shrug full strength b/l and symmetric  R facial hypomimia  Mild hypophonia    Motor:   -UE: 5/5 deltoids; 5/5 biceps, triceps; 5/5 wrist flexors, extensors; 5/5 interosseous; 5/5   -LEs: 5/5 hip flexion, extension; 5/5 knee flexion, extension; 5/5 ankle flexion, extension    DTRs:  ? Biceps Triceps Brachioradialis Knee Ankle   Left 2+ 2+ 2+ 2+ 2+   Right 2+ 2+ 2+ 2+ 2+     ? Finger taps Finger flicks MAGY Heel taps   Left 0 0 0 0   Right 1 1 0 1   Neck tone: nl  ? Arm Leg   Left 0 0   Right 1 1     Sensation:   -Light touch: Intact and symmetric in the bilateral upper and lower extremities.  -Temp: Intact and symmetric in the bilateral upper and lower extremities.  -Vibration: Decreased to knee    Coordination:   -Finger to nose: nl    Gait:  Mildly slow in all movements  -Arises from chair without use of hands.  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 Suggested aggressive PT, and continue carbidopa/levodopa  25/100mg PO TID. Parkinsonism is slightly asymmetrical, which may rule in favor or iPD. No increased reflexes as seen with PD-Plus. I suggested she bring in her brain MRI for review to shed light on her symptoms.            Cardiac/Vascular    Orthostatic hypotension    Current Assessment & Plan     Per report 12 years of orthostasis. Currently patient records Bps at home and has rare spikes to systolic 190's. Managed by OSH physician. She seems to suffer from dizziness on standing near constantly. I suggested an abdominal binder and consult to cariology for help with management. I also instructed her that carbidopa/levodopa can have blood pressure lowering effects. Suggested raising HOB, eating smaller meals, and using ice water to help with orthostatic moments.  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           Other Visit Diagnoses     Memory change    -  Primary    Relevant Orders    Ambulatory consult to Neuropsychology          Follow-up: 1 mo  Discussed the importance of ongoing exercise in efforts to improve mobility and balance.  Suggested a diet high in antioxidants such as berries and green tea.    Jana Vasquez MD, MS [facility]  Department of Neurology  Movement Disorders

## 2019-08-12 NOTE — ASSESSMENT & PLAN NOTE
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 Suggested aggressive PT, and continue carbidopa/levodopa 25/100mg PO TID. Parkinsonism is slightly asymmetrical, which may rule in favor or iPD. No increased reflexes as seen with PD-Plus. I suggested she bring in her brain MRI for review to shed light on her symptoms.

## 2019-08-19 ENCOUNTER — TELEPHONE (OUTPATIENT)
Dept: NEUROLOGY | Facility: CLINIC | Age: 74
End: 2019-08-19

## 2019-08-19 NOTE — TELEPHONE ENCOUNTER
Advised patient that no medications. She is asking if she is to hold C/L when BP is low? 102/52 standing this a.m.

## 2019-08-19 NOTE — TELEPHONE ENCOUNTER
----- Message from Evie Zheng sent at 8/19/2019  9:37 AM CDT -----  Contact: self @ [phone]  Pt was seen on 8-12-19.  Pt says Dr Vasquez wanted to change her medication but when she looked at her after visit summary all of her medications were the same.  Pls call to discuss asap.

## 2019-08-20 NOTE — TELEPHONE ENCOUNTER
Spoke with patient again and advised to hold C/L only if low BP and that cardiology will address Norvasc and orthostatic hypotension.

## 2019-09-04 ENCOUNTER — OFFICE VISIT (OUTPATIENT)
Dept: ENDOCRINOLOGY | Facility: CLINIC | Age: 74
End: 2019-09-04
Payer: MEDICARE

## 2019-09-04 VITALS
SYSTOLIC BLOOD PRESSURE: 114 MMHG | HEART RATE: 69 BPM | BODY MASS INDEX: 24.96 KG/M2 | WEIGHT: 149.81 LBS | DIASTOLIC BLOOD PRESSURE: 80 MMHG | HEIGHT: 65 IN

## 2019-09-04 DIAGNOSIS — I10 ESSENTIAL HYPERTENSION: Primary | ICD-10-CM

## 2019-09-04 PROCEDURE — 3079F PR MOST RECENT DIASTOLIC BLOOD PRESSURE 80-89 MM HG: ICD-10-PCS | Mod: CPTII,S$GLB,, | Performed by: INTERNAL MEDICINE

## 2019-09-04 PROCEDURE — 3074F PR MOST RECENT SYSTOLIC BLOOD PRESSURE < 130 MM HG: ICD-10-PCS | Mod: CPTII,S$GLB,, | Performed by: INTERNAL MEDICINE

## 2019-09-04 PROCEDURE — 3074F SYST BP LT 130 MM HG: CPT | Mod: CPTII,S$GLB,, | Performed by: INTERNAL MEDICINE

## 2019-09-04 PROCEDURE — 1101F PR PT FALLS ASSESS DOC 0-1 FALLS W/OUT INJ PAST YR: ICD-10-PCS | Mod: CPTII,S$GLB,, | Performed by: INTERNAL MEDICINE

## 2019-09-04 PROCEDURE — 3079F DIAST BP 80-89 MM HG: CPT | Mod: CPTII,S$GLB,, | Performed by: INTERNAL MEDICINE

## 2019-09-04 PROCEDURE — 99999 PR PBB SHADOW E&M-EST. PATIENT-LVL III: CPT | Mod: PBBFAC,,, | Performed by: INTERNAL MEDICINE

## 2019-09-04 PROCEDURE — 99999 PR PBB SHADOW E&M-EST. PATIENT-LVL III: ICD-10-PCS | Mod: PBBFAC,,, | Performed by: INTERNAL MEDICINE

## 2019-09-04 PROCEDURE — 99203 OFFICE O/P NEW LOW 30 MIN: CPT | Mod: S$GLB,,, | Performed by: INTERNAL MEDICINE

## 2019-09-04 PROCEDURE — 99203 PR OFFICE/OUTPT VISIT, NEW, LEVL III, 30-44 MIN: ICD-10-PCS | Mod: S$GLB,,, | Performed by: INTERNAL MEDICINE

## 2019-09-04 PROCEDURE — 1101F PT FALLS ASSESS-DOCD LE1/YR: CPT | Mod: CPTII,S$GLB,, | Performed by: INTERNAL MEDICINE

## 2019-09-04 NOTE — LETTER
September 4, 2019      Karen Espinosa MD  [address] [facility]  [address]  [facility] [address]           [facility] - Endocrinology [address]th [place]  [address]  [facility] [address]  Phone: [phone]          Patient: Madeline Reilly   MR Number: [id number]   YOB: 1945   Date of Visit: 9/4/2019       Dear Dr. Karen Espinosa:    Thank you for referring Madeline Reilly to me for evaluation. Attached you will find relevant portions of my assessment and plan of care.    If you have questions, please do not hesitate to call me. I look forward to following Madeline Reilly along with you.    Sincerely,    Kady Cifuentes MD    Enclosure  CC:  No Recipients    If you would like to receive this communication electronically, please contact [email or web address] or [phone] to request more information on [facility] access.    For providers and/or their staff who would like to refer a patient to [facility], please contact us through our one-stop-shop provider referral line, [facility], at [phone].    If you feel you have received this communication in error or would no longer like to receive these types of communications, please e-mail [email or web address]

## 2019-09-04 NOTE — ASSESSMENT & PLAN NOTE
Reviewed possible endocrine causes that can exacerbate hypertension including hyperaldo, thyroid disorders.   Reviewed previous evaluation done by Dr. Koroma for similar clinical picture six years ago. Evaluation including for elevated metanephrines/catecholamines was negative.     Considering repeat urine/plasma metanephrines.   Urine studies are highly inconvenient but more appropriate in a low suspicion case as there is less false positivity. But very inconvenient. Will consider plasma at her upcoming cardiology appointment.

## 2019-09-04 NOTE — PROGRESS NOTES
Subjective:      Patient ID: Madeline Reilly is a 73 y.o. female.    Chief Complaint:  Consult      History of Present Illness    Ms. Reilly is a 73 year old woman who is here for evaluation of hypertension that was diagnosed before 2005.   At the time she was undergoing much personal stressor due to family illness. At the time her BP was elevated 140/90s. She was started on a single agent at the time. Her response was too good and she began to experience dizziness, near syncopal episodes. The medication was stopped.     Dr. Leigh (PCP) has tried multiple agents but due low blood pressure she has been limited in her regimen.   She has used home BP monitors, that demonstrate low blood pressure in the evening and early morning. She tries to takes showers outside of this time period for fear of falling.     Started sinemet for her Parkinson's but due to exacerbation of her hypotension she has stopped. She continues to use clonidine 0.1 mg/24 hr and norvasc 10 mg daily only for her BP.   Denies falls since 5/2019.     Energy level fluctuates, but unchanged in the past year.   Has nightmares at night, she wakes up once at night. Appetite is unchanged, eats about two meals a day. Trying to add a third heavier snack (yogurt, fruit).     Denies headaches, sweats (although on sinemet experienced sweating), or palpitations. Has used compression stockings in the past but does not find it helps her energy level or BP.     Mother with mildly elevated BP.     Review of Systems   Constitutional: Negative for chills and fever.   HENT: Negative for congestion and sinus pressure.    Eyes: Negative for visual disturbance.   Respiratory: Negative for chest tightness and shortness of breath.    Cardiovascular: Negative for chest pain, palpitations and leg swelling.   Gastrointestinal: Negative for abdominal pain and vomiting.   Endocrine: Positive for cold intolerance.   Genitourinary: Negative for dysuria.   Musculoskeletal:  "Positive for arthralgias (  right knee and shoulder pain).   Skin: Negative for rash.   Neurological:        See HPI   Hematological: Does not bruise/bleed easily.   Psychiatric/Behavioral: Negative for sleep disturbance.       Objective:   Physical Exam   Constitutional: She is oriented to person, place, and time. She appears well-developed and well-nourished. No distress.   Eyes: Pupils are equal, round, and reactive to light. Conjunctivae and EOM are normal. No scleral icterus.   Neck: Normal range of motion. Neck supple. No tracheal deviation present. No thyromegaly present.   Cardiovascular: Normal rate and intact distal pulses.   Pulmonary/Chest: Effort normal and breath sounds normal.   Lymphadenopathy:     She has no cervical adenopathy.   Neurological: She is alert and oriented to person, place, and time. She has normal reflexes.   No tremor.   Skin: Skin is warm and dry. No rash noted.   Psychiatric: She has a normal mood and affect.     Vitals:    09/04/19 0956   BP: 114/80   Pulse: 69   Weight: 67.9 kg (149 lb 12.8 oz)   Height: 5' 5" (1.651 m)       BP Readings from Last 3 Encounters:   09/04/19 114/80   08/12/19 131/83   07/02/19 (!) 155/72     Wt Readings from Last 1 Encounters:   09/04/19 0956 67.9 kg (149 lb 12.8 oz)         Body mass index is 24.93 kg/m².    Lab Review:   Lab Results   Component Value Date    HGBA1C 5.5 04/30/2019     Lab Results   Component Value Date    CHOL 205 (H) 04/30/2019    HDL 67 04/30/2019    LDLCALC 123.6 04/30/2019    TRIG 72 04/30/2019    CHOLHDL 32.7 04/30/2019     Lab Results   Component Value Date     04/30/2019    K 4.2 04/30/2019     04/30/2019    CO2 23 04/30/2019    GLU 82 04/30/2019    BUN 22 04/30/2019    CREATININE 1.0 04/30/2019    CALCIUM 9.5 04/30/2019    PROT 7.5 04/30/2019    ALBUMIN 4.0 04/30/2019    BILITOT 0.4 04/30/2019    ALKPHOS 64 04/30/2019    AST 16 04/30/2019    ALT <5 (L) 04/30/2019    ANIONGAP 11 04/30/2019    ESTGFRAFRICA >60.0 " 04/30/2019    EGFRNONAA 55.2 (A) 04/30/2019    TSH 1.351 04/30/2019         Assessment and Plan     Hypertension  Reviewed possible endocrine causes that can exacerbate hypertension including hyperaldo, thyroid disorders.   Reviewed previous evaluation done by Dr. Koroma for similar clinical picture six years ago. Evaluation including for elevated metanephrines/catecholamines was negative.     Considering repeat urine/plasma metanephrines.   Urine studies are highly inconvenient but more appropriate in a low suspicion case as there is less false positivity. But very inconvenient. Will consider plasma at her upcoming cardiology appointment.

## 2019-09-10 ENCOUNTER — OFFICE VISIT (OUTPATIENT)
Dept: CARDIOLOGY | Facility: CLINIC | Age: 74
End: 2019-09-10
Payer: MEDICARE

## 2019-09-10 VITALS
SYSTOLIC BLOOD PRESSURE: 193 MMHG | WEIGHT: 151.88 LBS | HEART RATE: 55 BPM | OXYGEN SATURATION: 99 % | BODY MASS INDEX: 24.41 KG/M2 | DIASTOLIC BLOOD PRESSURE: 91 MMHG | HEIGHT: 66 IN

## 2019-09-10 DIAGNOSIS — R01.1 SYSTOLIC MURMUR: ICD-10-CM

## 2019-09-10 DIAGNOSIS — I10 ESSENTIAL HYPERTENSION: ICD-10-CM

## 2019-09-10 DIAGNOSIS — G20.A1 PARKINSON'S DISEASE: ICD-10-CM

## 2019-09-10 DIAGNOSIS — E78.00 PURE HYPERCHOLESTEROLEMIA: ICD-10-CM

## 2019-09-10 DIAGNOSIS — I95.1 ORTHOSTATIC HYPOTENSION: Primary | ICD-10-CM

## 2019-09-10 PROCEDURE — 99999 PR PBB SHADOW E&M-EST. PATIENT-LVL III: ICD-10-PCS | Mod: PBBFAC,,, | Performed by: INTERNAL MEDICINE

## 2019-09-10 PROCEDURE — 99499 RISK ADDL DX/OHS AUDIT: ICD-10-PCS | Mod: S$GLB,,, | Performed by: INTERNAL MEDICINE

## 2019-09-10 PROCEDURE — 99214 PR OFFICE/OUTPT VISIT, EST, LEVL IV, 30-39 MIN: ICD-10-PCS | Mod: S$GLB,,, | Performed by: INTERNAL MEDICINE

## 2019-09-10 PROCEDURE — 3077F PR MOST RECENT SYSTOLIC BLOOD PRESSURE >= 140 MM HG: ICD-10-PCS | Mod: CPTII,S$GLB,, | Performed by: INTERNAL MEDICINE

## 2019-09-10 PROCEDURE — 99499 UNLISTED E&M SERVICE: CPT | Mod: S$GLB,,, | Performed by: INTERNAL MEDICINE

## 2019-09-10 PROCEDURE — 1101F PT FALLS ASSESS-DOCD LE1/YR: CPT | Mod: CPTII,S$GLB,, | Performed by: INTERNAL MEDICINE

## 2019-09-10 PROCEDURE — 1101F PR PT FALLS ASSESS DOC 0-1 FALLS W/OUT INJ PAST YR: ICD-10-PCS | Mod: CPTII,S$GLB,, | Performed by: INTERNAL MEDICINE

## 2019-09-10 PROCEDURE — 99214 OFFICE O/P EST MOD 30 MIN: CPT | Mod: S$GLB,,, | Performed by: INTERNAL MEDICINE

## 2019-09-10 PROCEDURE — 3077F SYST BP >= 140 MM HG: CPT | Mod: CPTII,S$GLB,, | Performed by: INTERNAL MEDICINE

## 2019-09-10 PROCEDURE — 3080F DIAST BP >= 90 MM HG: CPT | Mod: CPTII,S$GLB,, | Performed by: INTERNAL MEDICINE

## 2019-09-10 PROCEDURE — 3080F PR MOST RECENT DIASTOLIC BLOOD PRESSURE >= 90 MM HG: ICD-10-PCS | Mod: CPTII,S$GLB,, | Performed by: INTERNAL MEDICINE

## 2019-09-10 PROCEDURE — 99999 PR PBB SHADOW E&M-EST. PATIENT-LVL III: CPT | Mod: PBBFAC,,, | Performed by: INTERNAL MEDICINE

## 2019-09-10 RX ORDER — CLONIDINE HYDROCHLORIDE 0.1 MG/1
0.1 TABLET ORAL
Qty: 30 TABLET | Refills: 3 | Status: SHIPPED | OUTPATIENT
Start: 2019-09-10 | End: 2019-10-16 | Stop reason: SDUPTHER

## 2019-09-10 RX ORDER — MIDODRINE HYDROCHLORIDE 2.5 MG/1
2.5 TABLET ORAL 3 TIMES DAILY
Qty: 90 TABLET | Refills: 11 | Status: SHIPPED | OUTPATIENT
Start: 2019-09-10 | End: 2019-09-10

## 2019-09-10 NOTE — PROGRESS NOTES
"Subjective:   Patient ID:  Madeline Reilly is a 73 y.o. female is a new patient who presents for evaluation of Orthostatic hypotension and Dizziness    HPI:   Per report 12 years of orthostasis. Currently patient records Bps at home and has rare spikes to systolic 190's. Managed by OSH physician. She seems to suffer from dizziness on standing near constantly. I suggested an abdominal binder and consult to cariology for help with management. I also instructed her that carbidopa/levodopa can have blood pressure lowering effects. Suggested raising HOB, eating smaller meals, and using ice water to help with orthostatic moments.  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    Patient does not exercise  No chest pain, Orthopnea, PND of heart failure symptoms.   Denies palpitations or fluttering in the chest  No f/h    The 10-year ASCVD risk score (Jacques SARAVIA Jr., et al., 2013) is: 29.1%    Values used to calculate the score:      Age: 73 years      Sex: Female      Is Non- : Yes      Diabetic: No      Tobacco smoker: No      Systolic Blood Pressure: 193 mmHg      Is BP treated: Yes      HDL Cholesterol: 67 mg/dL      Total Cholesterol: 205 mg/dL         Patient Active Problem List   Diagnosis    Asthma, chronic    Orthostatic hypotension    Hypertension    HLD (hyperlipidemia)    Micturition syncope    Vasovagal syncope    Parkinsonism     BP (!) 193/91 (BP Location: Left arm, Patient Position: Sitting, BP Method: Medium (Automatic))   Pulse (!) 55   Ht 5' 6" (1.676 m)   Wt 68.9 kg (151 lb 14.4 oz)   SpO2 99%   BMI 24.52 kg/m²   Body mass index is 24.52 kg/m².  CrCl cannot be calculated (Patient's most recent lab result is older than the maximum 7 days allowed.).    Lab Results   Component Value Date     04/30/2019    K 4.2 04/30/2019     04/30/2019    CO2 23 04/30/2019    BUN 22 04/30/2019    " BUN 15 09/29/2015    CREATININE 1.0 04/30/2019    GLU 82 04/30/2019    HGBA1C 5.5 04/30/2019    AST 16 04/30/2019    ALT <5 (L) 04/30/2019    ALBUMIN 4.0 04/30/2019    PROT 7.5 04/30/2019    BILITOT 0.4 04/30/2019    WBC 5.55 04/30/2019    HGB 12.4 04/30/2019    HCT 38.0 04/30/2019    MCV 92 04/30/2019     04/30/2019    TSH 1.351 04/30/2019    CHOL 205 (H) 04/30/2019    HDL 67 04/30/2019    LDLCALC 123.6 04/30/2019    LDLCALC 143 (H) 09/29/2015    TRIG 72 04/30/2019       Current Outpatient Medications   Medication Sig    ADVAIR DISKUS 500-50 mcg/dose DsDv diskus inhaler USE 1 INHALATION PO BID    ALBUTEROL SULFATE (VENTOLIN INHL) Inhale into the lungs every 4 (four) hours as needed.    atorvastatin (LIPITOR) 20 MG tablet TAKE 1 TABLET(20 MG) BY MOUTH EVERY DAY    azelastine (ASTELIN) 137 mcg (0.1 %) nasal spray SPRAY TWICE INTO EACH NOSTRIL ONCE  Q DAY    calcium-vitamin D 500-125 mg-unit tablet Take 1 tablet by mouth once daily.     famotidine-calcium carbonate-magnesium hydroxide (PEPCID COMPLETE) chewable tablet Take 1 tablet by mouth.    famotidine-calcium carbonate-magnesium hydroxide (PEPCID COMPLETE) chewable tablet Take 1 tablet by mouth daily as needed.    fexofenadine (ALLEGRA) 60 MG tablet Take 60 mg by mouth once as needed.     ipratropium (ATROVENT) 0.06 % nasal spray SPRAY TWICE IN EACH NOSTRIL BID    amLODIPine (NORVASC) 10 MG tablet Take 1 tablet (10 mg total) by mouth once daily.    carbidopa-levodopa  mg (SINEMET)  mg per tablet Take 1 tablet by mouth 3 (three) times daily.    cloNIDine (CATAPRES) 0.1 MG tablet Take 1 tablet (0.1 mg total) by mouth as needed. 1 tablet if BP> 180 mmHg.     No current facility-administered medications for this visit.        Review of Systems   Constitution: Negative for chills, decreased appetite, malaise/fatigue, night sweats, weight gain and weight loss.        Low BP and dizziness   Eyes: Negative for blurred vision, double vision,  visual disturbance and visual halos.   Cardiovascular: Negative for chest pain, claudication, cyanosis, dyspnea on exertion, irregular heartbeat, leg swelling, near-syncope, orthopnea, palpitations, paroxysmal nocturnal dyspnea and syncope.   Respiratory: Negative for cough, hemoptysis, snoring, sputum production and wheezing.    Endocrine: Negative for cold intolerance, heat intolerance, polydipsia and polyphagia.   Hematologic/Lymphatic: Negative for adenopathy and bleeding problem. Does not bruise/bleed easily.   Skin: Negative for flushing, itching, poor wound healing and rash.   Musculoskeletal: Negative for arthritis, back pain, falls, gout, joint pain, joint swelling, muscle cramps, muscle weakness, myalgias, neck pain and stiffness.   Gastrointestinal: Negative for bloating, abdominal pain, anorexia, diarrhea, dysphagia, excessive appetite, flatus, hematemesis, jaundice, melena and nausea.   Genitourinary: Negative for hesitancy and incomplete emptying.   Neurological: Positive for dizziness. Negative for aphonia, brief paralysis, difficulty with concentration, disturbances in coordination, excessive daytime sleepiness, focal weakness, light-headedness, loss of balance and weakness.   Psychiatric/Behavioral: Negative for altered mental status, depression, hallucinations, hypervigilance, memory loss, substance abuse and suicidal ideas. The patient does not have insomnia and is not nervous/anxious.        Objective:   Physical Exam   Constitutional: She is oriented to person, place, and time. She appears well-developed and well-nourished. No distress.   HENT:   Head: Normocephalic and atraumatic.   Nose: Nose normal.   Mouth/Throat: Oropharynx is clear and moist. No oropharyngeal exudate.   Eyes: Pupils are equal, round, and reactive to light. Conjunctivae and EOM are normal. Right eye exhibits no discharge. Left eye exhibits no discharge. No scleral icterus.   Neck: Normal range of motion. Neck supple. No JVD  present. No tracheal deviation present. No thyromegaly present.   Cardiovascular: Normal rate, regular rhythm, normal heart sounds and intact distal pulses. Exam reveals no gallop and no friction rub.   No murmur heard.  Pulmonary/Chest: Effort normal and breath sounds normal. No stridor. No respiratory distress. She has no wheezes. She has no rales. She exhibits no tenderness.   Abdominal: Soft. Bowel sounds are normal. She exhibits no distension and no mass. There is no tenderness. There is no rebound and no guarding.   Musculoskeletal: Normal range of motion. She exhibits no edema or tenderness.   Lymphadenopathy:     She has no cervical adenopathy.   Neurological: She is alert and oriented to person, place, and time. She has normal reflexes. No cranial nerve deficit. She exhibits normal muscle tone. Coordination normal.   Skin: Skin is warm. No rash noted. She is not diaphoretic. No erythema. No pallor.   Psychiatric: She has a normal mood and affect. Her behavior is normal. Judgment and thought content normal.       Assessment:     1. Orthostatic hypotension    2. Systolic murmur    3. Parkinson's disease    4. Pure hypercholesterolemia    5. Essential hypertension        Plan:   Patient has a complex problem of orthostatic hypotension as well as labile HTN. I would stop the patch for now take Clonidine PRN if BP> 180 mmHg and do the compression Stalkings at home. Bring a BP diary.     Madeline was seen today for orthostatic hypotension and dizziness.    Diagnoses and all orders for this visit:    Orthostatic hypotension    Systolic murmur  -     Echo Color Flow Doppler? Yes; Future    Parkinson's disease    Pure hypercholesterolemia    Essential hypertension    Other orders  -     Discontinue: midodrine (PROAMATINE) 2.5 MG Tab; Take 1 tablet (2.5 mg total) by mouth 3 (three) times daily.  -     cloNIDine (CATAPRES) 0.1 MG tablet; Take 1 tablet (0.1 mg total) by mouth as needed. 1 tablet if BP> 180  mmHg.      RTC  10 wk

## 2019-09-10 NOTE — LETTER
September 10, 2019      Jana Vasquez MD  [address]  [facility] [address]           [facility]  [address]  [facility] [address]  Phone: [phone]          Patient: Madeline Reilly   MR Number: [id number]   YOB: 1945   Date of Visit: 9/10/2019       Dear Dr. Jana Vasquez:    Thank you for referring Madeline Reilly to me for evaluation. Attached you will find relevant portions of my assessment and plan of care.    If you have questions, please do not hesitate to call me. I look forward to following Madeline Reilly along with you.    Sincerely,    Karine Robertson MD    Enclosure  CC:  No Recipients    If you would like to receive this communication electronically, please contact [email or web address] or [phone] to request more information on [employer] Link access.    For providers and/or their staff who would like to refer a patient to [facility], please contact us through our one-stop-shop provider referral line, [facility], at [phone].    If you feel you have received this communication in error or would no longer like to receive these types of communications, please e-mail [email or web address]

## 2019-09-19 ENCOUNTER — TELEPHONE (OUTPATIENT)
Dept: CARDIOLOGY | Facility: CLINIC | Age: 74
End: 2019-09-19

## 2019-09-19 ENCOUNTER — CLINICAL SUPPORT (OUTPATIENT)
Dept: CARDIOLOGY | Facility: CLINIC | Age: 74
End: 2019-09-19
Attending: INTERNAL MEDICINE
Payer: MEDICARE

## 2019-09-19 VITALS
DIASTOLIC BLOOD PRESSURE: 100 MMHG | WEIGHT: 151 LBS | HEART RATE: 58 BPM | SYSTOLIC BLOOD PRESSURE: 190 MMHG | HEIGHT: 66 IN | BODY MASS INDEX: 24.27 KG/M2

## 2019-09-19 DIAGNOSIS — R01.1 SYSTOLIC MURMUR: ICD-10-CM

## 2019-09-19 LAB
ASCENDING AORTA: 3.08 CM
AV INDEX (PROSTH): 0.73
AV MEAN GRADIENT: 4 MMHG
AV PEAK GRADIENT: 7 MMHG
AV VALVE AREA: 2.39 CM2
AV VELOCITY RATIO: 0.78
BSA FOR ECHO PROCEDURE: 1.79 M2
CV ECHO LV RWT: 0.46 CM
DOP CALC AO PEAK VEL: 1.35 M/S
DOP CALC AO VTI: 35.42 CM
DOP CALC LVOT AREA: 3.3 CM2
DOP CALC LVOT DIAMETER: 2.04 CM
DOP CALC LVOT PEAK VEL: 1.05 M/S
DOP CALC LVOT STROKE VOLUME: 84.48 CM3
DOP CALCLVOT PEAK VEL VTI: 25.86 CM
E WAVE DECELERATION TIME: 186.48 MSEC
E/A RATIO: 0.98
E/E' RATIO: 24 M/S
ECHO LV POSTERIOR WALL: 0.93 CM (ref 0.6–1.1)
FRACTIONAL SHORTENING: 28 % (ref 28–44)
INTERVENTRICULAR SEPTUM: 0.75 CM (ref 0.6–1.1)
IVRT: 0.09 MSEC
LA MAJOR: 5 CM
LA MINOR: 5.03 CM
LA WIDTH: 2.75 CM
LEFT ATRIUM SIZE: 3.68 CM
LEFT ATRIUM VOLUME INDEX: 24.3 ML/M2
LEFT ATRIUM VOLUME: 43.14 CM3
LEFT INTERNAL DIMENSION IN SYSTOLE: 2.94 CM (ref 2.1–4)
LEFT VENTRICLE DIASTOLIC VOLUME INDEX: 40.87 ML/M2
LEFT VENTRICLE DIASTOLIC VOLUME: 72.54 ML
LEFT VENTRICLE MASS INDEX: 58 G/M2
LEFT VENTRICLE SYSTOLIC VOLUME INDEX: 18.8 ML/M2
LEFT VENTRICLE SYSTOLIC VOLUME: 33.38 ML
LEFT VENTRICULAR INTERNAL DIMENSION IN DIASTOLE: 4.06 CM (ref 3.5–6)
LEFT VENTRICULAR MASS: 102.27 G
LV LATERAL E/E' RATIO: 28 M/S
LV SEPTAL E/E' RATIO: 21 M/S
MV PEAK A VEL: 0.86 M/S
MV PEAK E VEL: 0.84 M/S
PISA TR MAX VEL: 2.43 M/S
PULM VEIN S/D RATIO: 1.3
PV PEAK D VEL: 0.53 M/S
PV PEAK S VEL: 0.69 M/S
PV PEAK VELOCITY: 1.07 CM/S
RA MAJOR: 5 CM
RA PRESSURE: 3 MMHG
RA WIDTH: 2.45 CM
SINUS: 3.13 CM
STJ: 2.73 CM
TDI LATERAL: 0.03 M/S
TDI SEPTAL: 0.04 M/S
TDI: 0.04 M/S
TR MAX PG: 24 MMHG
TRICUSPID ANNULAR PLANE SYSTOLIC EXCURSION: 2.32 CM
TV REST PULMONARY ARTERY PRESSURE: 27 MMHG

## 2019-09-19 PROCEDURE — 99999 PR PBB SHADOW E&M-EST. PATIENT-LVL II: CPT | Mod: PBBFAC,,,

## 2019-09-19 PROCEDURE — 99999 PR PBB SHADOW E&M-EST. PATIENT-LVL II: ICD-10-PCS | Mod: PBBFAC,,,

## 2019-09-19 PROCEDURE — 93306 TTE W/DOPPLER COMPLETE: CPT | Mod: S$GLB,,, | Performed by: INTERNAL MEDICINE

## 2019-09-19 PROCEDURE — 93306 ECHO (CUPID ONLY): ICD-10-PCS | Mod: S$GLB,,, | Performed by: INTERNAL MEDICINE

## 2019-09-19 NOTE — TELEPHONE ENCOUNTER
----- Message from Karine Robertson MD sent at 9/19/2019  1:47 PM CDT -----  plz let pt. Know that echo (ultrasound of the heart) is normal. There is mild buildup of fluid around the heart. I am not sure what the cause of this? But this is very small on my review so I am not worried.

## 2019-09-19 NOTE — PROGRESS NOTES
plz let pt. Know that echo (ultrasound of the heart) is normal. There is mild buildup of fluid around the heart. I am not sure what the cause of this? But this is very small on my review so I am not worried.

## 2019-09-20 ENCOUNTER — TELEPHONE (OUTPATIENT)
Dept: CARDIOLOGY | Facility: CLINIC | Age: 74
End: 2019-09-20

## 2019-10-10 ENCOUNTER — PATIENT OUTREACH (OUTPATIENT)
Dept: ADMINISTRATIVE | Facility: OTHER | Age: 74
End: 2019-10-10

## 2019-10-14 ENCOUNTER — TELEPHONE (OUTPATIENT)
Dept: NEUROLOGY | Facility: CLINIC | Age: 74
End: 2019-10-14

## 2019-10-14 RX ORDER — NAPROXEN 500 MG/1
500 TABLET ORAL 2 TIMES DAILY WITH MEALS
OUTPATIENT
Start: 2019-10-14

## 2019-10-14 NOTE — TELEPHONE ENCOUNTER
Pt is requesting a refill on medication    ----- Message from Ann Alvarez sent at 10/14/2019 12:05 PM CDT -----  Contact: SELF/ [phone]  Patient is calling for an RX refill or new RX.  Is this a refill or new RX:  REFILL  RX name and strength: Naproxen 500 MG   Directions (copy/paste from chart):    Is this a 30 day or 90 day RX:    Local pharmacy or mail order pharmacy:    Pharmacy name and phone # (copy/paste from chart): [facility] #[id number] [address] AT [facility] [address] [phone] (Phone)  [phone] (Fax)    Comments:

## 2019-10-14 NOTE — TELEPHONE ENCOUNTER
Pt is requesting refill on medication    ----- Message from Elizabeth Goddard sent at 10/14/2019  4:24 PM CDT -----  Contact: self   Patient is calling for an RX refill or new RX.  Is this a refill or new RX:    RX name and strength:  Naproxen 500mg  Directions (copy/paste from chart):    Is this a 30 day or 90 day RX:    Local pharmacy or mail order pharmacy:  [facility] #[id number] [place] [address] AT [facility] [address] [phone] (Phone)  [phone] (Fax)  Pharmacy name and phone # (copy/paste from chart):     Comments:  Patient states the medication was previously prescribed

## 2019-10-14 NOTE — TELEPHONE ENCOUNTER
----- Message from Cinthya Zacarias sent at 10/14/2019 11:00 AM CDT -----  Contact: Madeline RodriguezLibby Reilly came in today and was advise that she needs to reschedule her 2 month follow up. She can be reached at [phone].

## 2019-10-16 ENCOUNTER — OFFICE VISIT (OUTPATIENT)
Dept: PRIMARY CARE CLINIC | Facility: CLINIC | Age: 74
End: 2019-10-16
Payer: MEDICARE

## 2019-10-16 VITALS
WEIGHT: 152.31 LBS | HEART RATE: 51 BPM | HEIGHT: 64 IN | TEMPERATURE: 98 F | DIASTOLIC BLOOD PRESSURE: 74 MMHG | OXYGEN SATURATION: 100 % | SYSTOLIC BLOOD PRESSURE: 110 MMHG | BODY MASS INDEX: 26 KG/M2

## 2019-10-16 DIAGNOSIS — I10 MALIGNANT HYPERTENSION: Primary | ICD-10-CM

## 2019-10-16 DIAGNOSIS — G20.A1 PARKINSON'S DISEASE: ICD-10-CM

## 2019-10-16 DIAGNOSIS — M62.838 MUSCLE SPASM: ICD-10-CM

## 2019-10-16 DIAGNOSIS — I95.1 ORTHOSTATIC HYPOTENSION: ICD-10-CM

## 2019-10-16 DIAGNOSIS — I10 HYPERTENSION, UNSPECIFIED TYPE: ICD-10-CM

## 2019-10-16 PROCEDURE — 1101F PR PT FALLS ASSESS DOC 0-1 FALLS W/OUT INJ PAST YR: ICD-10-PCS | Mod: CPTII,S$GLB,, | Performed by: NURSE PRACTITIONER

## 2019-10-16 PROCEDURE — 99999 PR PBB SHADOW E&M-EST. PATIENT-LVL V: CPT | Mod: PBBFAC,,, | Performed by: NURSE PRACTITIONER

## 2019-10-16 PROCEDURE — 3074F PR MOST RECENT SYSTOLIC BLOOD PRESSURE < 130 MM HG: ICD-10-PCS | Mod: CPTII,S$GLB,, | Performed by: NURSE PRACTITIONER

## 2019-10-16 PROCEDURE — 99214 PR OFFICE/OUTPT VISIT, EST, LEVL IV, 30-39 MIN: ICD-10-PCS | Mod: S$GLB,,, | Performed by: NURSE PRACTITIONER

## 2019-10-16 PROCEDURE — 99214 OFFICE O/P EST MOD 30 MIN: CPT | Mod: S$GLB,,, | Performed by: NURSE PRACTITIONER

## 2019-10-16 PROCEDURE — 3078F DIAST BP <80 MM HG: CPT | Mod: CPTII,S$GLB,, | Performed by: NURSE PRACTITIONER

## 2019-10-16 PROCEDURE — 3074F SYST BP LT 130 MM HG: CPT | Mod: CPTII,S$GLB,, | Performed by: NURSE PRACTITIONER

## 2019-10-16 PROCEDURE — 3078F PR MOST RECENT DIASTOLIC BLOOD PRESSURE < 80 MM HG: ICD-10-PCS | Mod: CPTII,S$GLB,, | Performed by: NURSE PRACTITIONER

## 2019-10-16 PROCEDURE — 99999 PR PBB SHADOW E&M-EST. PATIENT-LVL V: ICD-10-PCS | Mod: PBBFAC,,, | Performed by: NURSE PRACTITIONER

## 2019-10-16 PROCEDURE — 1101F PT FALLS ASSESS-DOCD LE1/YR: CPT | Mod: CPTII,S$GLB,, | Performed by: NURSE PRACTITIONER

## 2019-10-16 RX ORDER — TIZANIDINE 2 MG/1
2 TABLET ORAL EVERY 8 HOURS PRN
Qty: 30 TABLET | Refills: 0 | Status: SHIPPED | OUTPATIENT
Start: 2019-10-16 | End: 2019-10-26

## 2019-10-16 RX ORDER — AMLODIPINE BESYLATE 5 MG/1
TABLET ORAL
Qty: 60 TABLET | Refills: 1 | Status: SHIPPED | OUTPATIENT
Start: 2019-10-16 | End: 2019-10-16

## 2019-10-16 RX ORDER — AMLODIPINE BESYLATE 5 MG/1
TABLET ORAL
Qty: 30 TABLET | Refills: 0 | Status: SHIPPED | OUTPATIENT
Start: 2019-10-16 | End: 2020-11-18 | Stop reason: SDUPTHER

## 2019-10-16 RX ORDER — LOPERAMIDE HYDROCHLORIDE 2 MG/1
CAPSULE ORAL
Refills: 11 | COMMUNITY
Start: 2019-09-05

## 2019-10-16 RX ORDER — CLONIDINE HYDROCHLORIDE 0.1 MG/1
0.1 TABLET ORAL
Qty: 30 TABLET | Refills: 0 | Status: SHIPPED | OUTPATIENT
Start: 2019-10-16 | End: 2019-10-16

## 2019-10-16 RX ORDER — CLONIDINE HYDROCHLORIDE 0.1 MG/1
0.2 TABLET ORAL
Status: COMPLETED | OUTPATIENT
Start: 2019-10-16 | End: 2019-10-16

## 2019-10-16 RX ADMIN — CLONIDINE HYDROCHLORIDE 0.2 MG: 0.1 TABLET ORAL at 02:10

## 2019-10-16 NOTE — PATIENT INSTRUCTIONS
Take 1 tablet of 5mg Amlodipine in the morning if your blood pressure is >180/>100    If you blood pressure is low (<100/<60) do not take amlodipine, clonidine, or carbidopa-levadopa.

## 2019-10-16 NOTE — Clinical Note
I saw Ms. Reilly today for back pain. Her BP on arrival was 210/110. I gave her clonidine. Her BP did not drop while seated over the next 60 minutes, but standing her BP was 110/74. I consulted with Dr Christian and she said it was OK to discharge her home. She is seeing cardiology again next week.

## 2019-10-16 NOTE — PROGRESS NOTES
Subjective:       Patient ID: Madeline Reilly is a 73 y.o. female.    Chief Complaint: Back Pain    Ms. Reilly presents for right sided lumbar back pain that feels like apulling, squeezing and throbbing. The pain comes and goes. She had it longer and worse in intensity than normal on Monday when she scheduled this appointment. She is not currently in pain.    Upon arrival to the clinic her BP was 210/110. After seeing cardiology last month, she stopped her amlodipine and clonidine. She has also stopped her carbadopa/levadopa. These were stopped due to orthostatic hypotension, which she says is worse in the morning and late evening.     Back Pain       Review of Systems   Musculoskeletal: Positive for back pain.       Objective:      Physical Exam    Assessment:       1. Malignant hypertension    2. Orthostatic hypotension    3. Hypertension, unspecified type    4. Parkinson's disease    5. Muscle spasm        Plan:   1. Malignant hypertension  - cloNIDine tablet 0.2 mg  - After administering clonidine, her sitting BP was not improving. However, her standing BP was declining. I consulted with my collaborative provider, Dr. Christian, who recommended discharging the patient home, despite elevated sitting BP and to prescribe 5 mg of amlodipine, 1 tab daily, for BP >180/>100. Patient verbalized understanding of these instructions, which I also provided in writing.     2. Orthostatic hypotension  - Keep follow ups with neurology and cardiology as scheduled.     3. Hypertension, unspecified type  - amLODIPine (NORVASC) 5 MG tablet; Take one tablet every morning. Take an additional tablet every afternoon if BP is >180/>100  Dispense: 30 tablet; Refill: 0    4. Parkinson's disease  - Restart sinemet, hold for BP <100/<60    5. Muscle spasm  - tiZANidine (ZANAFLEX) 2 MG tablet; Take 1 tablet (2 mg total) by mouth every 8 (eight) hours as needed.  Dispense: 30 tablet; Refill: 0      Pt has been given instructions populated  from [employer] database and has verbalized understanding of the after visit summary and information contained wherein.    Follow up with a primary care provider. May go to ER for acute shortness of breath, lightheadedness, fever, or any other emergent complaints or changes in condition.

## 2019-10-20 ENCOUNTER — PATIENT MESSAGE (OUTPATIENT)
Dept: CARDIOLOGY | Facility: CLINIC | Age: 74
End: 2019-10-20

## 2019-10-20 ENCOUNTER — PATIENT MESSAGE (OUTPATIENT)
Dept: NEUROLOGY | Facility: CLINIC | Age: 74
End: 2019-10-20

## 2019-10-20 ENCOUNTER — PATIENT MESSAGE (OUTPATIENT)
Dept: PRIMARY CARE CLINIC | Facility: CLINIC | Age: 74
End: 2019-10-20

## 2019-10-21 ENCOUNTER — PATIENT OUTREACH (OUTPATIENT)
Dept: ADMINISTRATIVE | Facility: OTHER | Age: 74
End: 2019-10-21

## 2019-10-24 ENCOUNTER — OFFICE VISIT (OUTPATIENT)
Dept: CARDIOLOGY | Facility: CLINIC | Age: 74
End: 2019-10-24
Payer: MEDICARE

## 2019-10-24 VITALS
HEART RATE: 64 BPM | DIASTOLIC BLOOD PRESSURE: 105 MMHG | HEIGHT: 65 IN | BODY MASS INDEX: 25.2 KG/M2 | WEIGHT: 151.25 LBS | SYSTOLIC BLOOD PRESSURE: 223 MMHG

## 2019-10-24 DIAGNOSIS — I95.1 ORTHOSTATIC HYPOTENSION: ICD-10-CM

## 2019-10-24 DIAGNOSIS — R09.89 LABILE HYPERTENSION: Primary | ICD-10-CM

## 2019-10-24 DIAGNOSIS — G20.A1 PARKINSON'S DISEASE: ICD-10-CM

## 2019-10-24 DIAGNOSIS — R55 VASOVAGAL SYNCOPE: ICD-10-CM

## 2019-10-24 DIAGNOSIS — E78.00 PURE HYPERCHOLESTEROLEMIA: ICD-10-CM

## 2019-10-24 PROCEDURE — 3080F PR MOST RECENT DIASTOLIC BLOOD PRESSURE >= 90 MM HG: ICD-10-PCS | Mod: CPTII,S$GLB,, | Performed by: INTERNAL MEDICINE

## 2019-10-24 PROCEDURE — 1101F PT FALLS ASSESS-DOCD LE1/YR: CPT | Mod: CPTII,S$GLB,, | Performed by: INTERNAL MEDICINE

## 2019-10-24 PROCEDURE — 99999 PR PBB SHADOW E&M-EST. PATIENT-LVL IV: ICD-10-PCS | Mod: PBBFAC,,, | Performed by: INTERNAL MEDICINE

## 2019-10-24 PROCEDURE — 1101F PR PT FALLS ASSESS DOC 0-1 FALLS W/OUT INJ PAST YR: ICD-10-PCS | Mod: CPTII,S$GLB,, | Performed by: INTERNAL MEDICINE

## 2019-10-24 PROCEDURE — 99499 RISK ADDL DX/OHS AUDIT: ICD-10-PCS | Mod: S$GLB,,, | Performed by: INTERNAL MEDICINE

## 2019-10-24 PROCEDURE — 3080F DIAST BP >= 90 MM HG: CPT | Mod: CPTII,S$GLB,, | Performed by: INTERNAL MEDICINE

## 2019-10-24 PROCEDURE — 99214 PR OFFICE/OUTPT VISIT, EST, LEVL IV, 30-39 MIN: ICD-10-PCS | Mod: S$GLB,,, | Performed by: INTERNAL MEDICINE

## 2019-10-24 PROCEDURE — 3077F PR MOST RECENT SYSTOLIC BLOOD PRESSURE >= 140 MM HG: ICD-10-PCS | Mod: CPTII,S$GLB,, | Performed by: INTERNAL MEDICINE

## 2019-10-24 PROCEDURE — 3077F SYST BP >= 140 MM HG: CPT | Mod: CPTII,S$GLB,, | Performed by: INTERNAL MEDICINE

## 2019-10-24 PROCEDURE — 99214 OFFICE O/P EST MOD 30 MIN: CPT | Mod: S$GLB,,, | Performed by: INTERNAL MEDICINE

## 2019-10-24 PROCEDURE — 99499 UNLISTED E&M SERVICE: CPT | Mod: S$GLB,,, | Performed by: INTERNAL MEDICINE

## 2019-10-24 PROCEDURE — 99999 PR PBB SHADOW E&M-EST. PATIENT-LVL IV: CPT | Mod: PBBFAC,,, | Performed by: INTERNAL MEDICINE

## 2019-10-24 NOTE — PROGRESS NOTES
"Subjective:   Patient ID:  Madeline Reilly is a 73 y.o. female who presents for follow-up of Orthostatic hypotension (6 week f/u )  Madeline Reilly is a 73 y.o. female is a new patient who presents for evaluation of Orthostatic hypotension and Dizziness     HPI:   Per report 12 years of orthostasis. Currently patient records Bps at home and has rare spikes to systolic 190's. Managed by OSH physician. She seems to suffer from dizziness on standing near constantly. I suggested an abdominal binder and consult to cariology for help with management. I also instructed her that carbidopa/levodopa can have blood pressure lowering effects. Suggested raising HOB, eating smaller meals, and using ice water to help with orthostatic moments.  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     Patient does not exercise  No chest pain, Orthopnea, PND of heart failure symptoms.   Denies palpitations or fluttering in the chest  No f/h    HPI:   She has labile HTN related to parkinson's  Reviewed BP diary . Much better BP at home than in the clinic. She does get occasional episodes of hypotension though  No chest pain, Orthopnea, PND of heart failure symptoms.   Denies palpitations or fluttering in the chest      Patient Active Problem List   Diagnosis    Asthma, chronic    Orthostatic hypotension    Hypertension    HLD (hyperlipidemia)    Micturition syncope    Vasovagal syncope    Parkinsonism     BP (!) 223/105 (BP Location: Left arm, Patient Position: Sitting, BP Method: Medium (Automatic))   Pulse 64   Ht 5' 5" (1.651 m)   Wt 68.6 kg (151 lb 3.8 oz)   BMI 25.17 kg/m²   Body mass index is 25.17 kg/m².  CrCl cannot be calculated (Patient's most recent lab result is older than the maximum 7 days allowed.).    Lab Results   Component Value Date     04/30/2019    K 4.2 04/30/2019     04/30/2019    CO2 23 04/30/2019    BUN 22 " 04/30/2019    BUN 15 09/29/2015    CREATININE 1.0 04/30/2019    GLU 82 04/30/2019    HGBA1C 5.5 04/30/2019    AST 16 04/30/2019    ALT <5 (L) 04/30/2019    ALBUMIN 4.0 04/30/2019    PROT 7.5 04/30/2019    BILITOT 0.4 04/30/2019    WBC 5.55 04/30/2019    HGB 12.4 04/30/2019    HCT 38.0 04/30/2019    MCV 92 04/30/2019     04/30/2019    TSH 1.351 04/30/2019    CHOL 205 (H) 04/30/2019    HDL 67 04/30/2019    LDLCALC 123.6 04/30/2019    LDLCALC 143 (H) 09/29/2015    TRIG 72 04/30/2019       Current Outpatient Medications   Medication Sig    ADVAIR DISKUS 500-50 mcg/dose DsDv diskus inhaler USE 1 INHALATION PO BID    ALBUTEROL SULFATE (VENTOLIN INHL) Inhale into the lungs every 4 (four) hours as needed.    amLODIPine (NORVASC) 5 MG tablet Take one tablet every morning. Take an additional tablet every afternoon if BP is >140/>90    atorvastatin (LIPITOR) 20 MG tablet TAKE 1 TABLET(20 MG) BY MOUTH EVERY DAY    azelastine (ASTELIN) 137 mcg (0.1 %) nasal spray SPRAY TWICE INTO EACH NOSTRIL ONCE  Q DAY    calcium-vitamin D 500-125 mg-unit tablet Take 1 tablet by mouth once daily.     carbidopa-levodopa  mg (SINEMET)  mg per tablet Take 1 tablet by mouth 3 (three) times daily.    famotidine-calcium carbonate-magnesium hydroxide (PEPCID COMPLETE) chewable tablet Take 1 tablet by mouth.    famotidine-calcium carbonate-magnesium hydroxide (PEPCID COMPLETE) chewable tablet Take 1 tablet by mouth daily as needed.    fexofenadine (ALLEGRA) 60 MG tablet Take 60 mg by mouth once as needed.     ipratropium (ATROVENT) 0.06 % nasal spray SPRAY TWICE IN EACH NOSTRIL BID    loperamide (IMODIUM) 2 mg capsule TK ONE C PO  QID PRN     No current facility-administered medications for this visit.        Review of Systems   Neurological: Positive for light-headedness (related to episodes of hypotension).       Objective:   Physical Exam   Constitutional: She is oriented to person, place, and time. She appears  well-developed and well-nourished. No distress.   HENT:   Head: Normocephalic and atraumatic.   Nose: Nose normal.   Mouth/Throat: Oropharynx is clear and moist. No oropharyngeal exudate.   Eyes: Pupils are equal, round, and reactive to light. Conjunctivae and EOM are normal. Right eye exhibits no discharge. Left eye exhibits no discharge. No scleral icterus.   Neck: Normal range of motion. Neck supple. No JVD present. No tracheal deviation present. No thyromegaly present.   Cardiovascular: Normal rate, regular rhythm, normal heart sounds and intact distal pulses. Exam reveals no gallop and no friction rub.   No murmur heard.  Pulmonary/Chest: Effort normal and breath sounds normal. No stridor. No respiratory distress. She has no wheezes. She has no rales. She exhibits no tenderness.   Abdominal: Soft. Bowel sounds are normal. She exhibits no distension and no mass. There is no tenderness. There is no rebound and no guarding.   Musculoskeletal: Normal range of motion. She exhibits no edema or tenderness.   Lymphadenopathy:     She has no cervical adenopathy.   Neurological: She is alert and oriented to person, place, and time. She has normal reflexes. No cranial nerve deficit. She exhibits normal muscle tone. Coordination normal.   Skin: Skin is warm. No rash noted. She is not diaphoretic. No erythema. No pallor.   Psychiatric: She has a normal mood and affect. Her behavior is normal. Judgment and thought content normal.       Assessment:     1. Labile hypertension    2. Parkinson's disease    3. Pure hypercholesterolemia    4. Orthostatic hypotension    5. Vasovagal syncope        Plan:   We discussed the labile nature of her hypertension and that she has noticed her BP to be highest in the morning, start taking norvasc in the evening clonidine only if her SBP is greater than 180 mmHg and please discuss any BP changes In medications with me

## 2019-12-04 ENCOUNTER — PATIENT MESSAGE (OUTPATIENT)
Dept: PRIMARY CARE CLINIC | Facility: CLINIC | Age: 74
End: 2019-12-04

## 2019-12-05 ENCOUNTER — TELEPHONE (OUTPATIENT)
Dept: PRIMARY CARE CLINIC | Facility: CLINIC | Age: 74
End: 2019-12-05

## 2019-12-05 NOTE — TELEPHONE ENCOUNTER
Left VM advising pt to call the office. Provider left message advising pt she needs to follow up with endocrine and that her appointment with provider can be canceled.

## 2019-12-06 ENCOUNTER — OFFICE VISIT (OUTPATIENT)
Dept: PRIMARY CARE CLINIC | Facility: CLINIC | Age: 74
End: 2019-12-06
Payer: MEDICARE

## 2019-12-06 VITALS
TEMPERATURE: 98 F | RESPIRATION RATE: 18 BRPM | DIASTOLIC BLOOD PRESSURE: 80 MMHG | OXYGEN SATURATION: 96 % | SYSTOLIC BLOOD PRESSURE: 142 MMHG | HEART RATE: 65 BPM | BODY MASS INDEX: 24.87 KG/M2 | WEIGHT: 149.25 LBS | HEIGHT: 65 IN

## 2019-12-06 DIAGNOSIS — R09.89 LABILE HYPERTENSION: ICD-10-CM

## 2019-12-06 DIAGNOSIS — I12.9 HYPERTENSIVE CHRONIC KIDNEY DISEASE WITH STAGE 1 THROUGH STAGE 4 CHRONIC KIDNEY DISEASE, OR UNSPECIFIED CHRONIC KIDNEY DISEASE: ICD-10-CM

## 2019-12-06 DIAGNOSIS — R29.6 FALLS FREQUENTLY: Primary | ICD-10-CM

## 2019-12-06 PROCEDURE — 1126F AMNT PAIN NOTED NONE PRSNT: CPT | Mod: S$GLB,,, | Performed by: FAMILY MEDICINE

## 2019-12-06 PROCEDURE — 99999 PR PBB SHADOW E&M-EST. PATIENT-LVL V: CPT | Mod: PBBFAC,,, | Performed by: FAMILY MEDICINE

## 2019-12-06 PROCEDURE — 3288F FALL RISK ASSESSMENT DOCD: CPT | Mod: CPTII,S$GLB,, | Performed by: FAMILY MEDICINE

## 2019-12-06 PROCEDURE — 1126F PR PAIN SEVERITY QUANTIFIED, NO PAIN PRESENT: ICD-10-PCS | Mod: S$GLB,,, | Performed by: FAMILY MEDICINE

## 2019-12-06 PROCEDURE — 1100F PR PT FALLS ASSESS DOC 2+ FALLS/FALL W/INJURY/YR: ICD-10-PCS | Mod: CPTII,S$GLB,, | Performed by: FAMILY MEDICINE

## 2019-12-06 PROCEDURE — 3288F PR FALLS RISK ASSESSMENT DOCUMENTED: ICD-10-PCS | Mod: CPTII,S$GLB,, | Performed by: FAMILY MEDICINE

## 2019-12-06 PROCEDURE — 3079F DIAST BP 80-89 MM HG: CPT | Mod: CPTII,S$GLB,, | Performed by: FAMILY MEDICINE

## 2019-12-06 PROCEDURE — 3077F SYST BP >= 140 MM HG: CPT | Mod: CPTII,S$GLB,, | Performed by: FAMILY MEDICINE

## 2019-12-06 PROCEDURE — 1159F MED LIST DOCD IN RCRD: CPT | Mod: S$GLB,,, | Performed by: FAMILY MEDICINE

## 2019-12-06 PROCEDURE — 99499 RISK ADDL DX/OHS AUDIT: ICD-10-PCS | Mod: S$GLB,,, | Performed by: FAMILY MEDICINE

## 2019-12-06 PROCEDURE — 99999 PR PBB SHADOW E&M-EST. PATIENT-LVL V: ICD-10-PCS | Mod: PBBFAC,,, | Performed by: FAMILY MEDICINE

## 2019-12-06 PROCEDURE — 1100F PTFALLS ASSESS-DOCD GE2>/YR: CPT | Mod: CPTII,S$GLB,, | Performed by: FAMILY MEDICINE

## 2019-12-06 PROCEDURE — 99214 PR OFFICE/OUTPT VISIT, EST, LEVL IV, 30-39 MIN: ICD-10-PCS | Mod: S$GLB,,, | Performed by: FAMILY MEDICINE

## 2019-12-06 PROCEDURE — 99499 UNLISTED E&M SERVICE: CPT | Mod: S$GLB,,, | Performed by: FAMILY MEDICINE

## 2019-12-06 PROCEDURE — 3079F PR MOST RECENT DIASTOLIC BLOOD PRESSURE 80-89 MM HG: ICD-10-PCS | Mod: CPTII,S$GLB,, | Performed by: FAMILY MEDICINE

## 2019-12-06 PROCEDURE — 1159F PR MEDICATION LIST DOCUMENTED IN MEDICAL RECORD: ICD-10-PCS | Mod: S$GLB,,, | Performed by: FAMILY MEDICINE

## 2019-12-06 PROCEDURE — 3077F PR MOST RECENT SYSTOLIC BLOOD PRESSURE >= 140 MM HG: ICD-10-PCS | Mod: CPTII,S$GLB,, | Performed by: FAMILY MEDICINE

## 2019-12-06 PROCEDURE — 99214 OFFICE O/P EST MOD 30 MIN: CPT | Mod: S$GLB,,, | Performed by: FAMILY MEDICINE

## 2019-12-06 RX ORDER — VIT C/E/ZN/COPPR/LUTEIN/ZEAXAN 250MG-90MG
1000 CAPSULE ORAL
COMMUNITY

## 2019-12-06 NOTE — PATIENT INSTRUCTIONS
"  Preventing Falls: Are You At Risk of Falling?     Ask for help to reduce risk of falling in your home.     As you get older, you're not as steady on your feet as you once were. And you may have health problems you didn't have when you were younger. So, it's not surprising that older people are more likely to trip and fall. Falling can be very serious. It can change your overall health and quality of life. That's why it's important to be aware of your own risk of falling.  The dangers of falling  Falls are one of the main causes of injury in people over age 65. An older person who falls may take longer to get better than a younger person. And, after a fall, an older person is more likely to have problems that don't go away. So, preventing falls can help you avoid serious health problems.  Are you at risk of falling?  Answer these questions to rate your level of risk.  · Are you a woman?  · Have you fallen or stumbled in the last year?  · Are you over age 65?  · Are you ever dizzy or lightheaded with standing?  · Do you have a hard time getting in and out of the bathtub or on and off the toilet?  · Do you lean on objects to help you get around? Or do you use a cane or walker?  · Do you have vision or hearing problems? For example, do you need new glasses or hearing aids?  · Do you have 2 or more long-lasting (chronic) medical conditions?  · Do you take 3 or more medicines?  · Have you felt depressed recently?  · Have you had more trouble with your memory in recent months?  · Are there hazards in your home that might cause you to fall, such as loose rugs or poor lighting?  · Do you have a pet that jumps on you or might trip you?  · Have you stopped getting regular exercise?  · Do you have diabetes?   · Do you have a neurologic disease, such as Parkinson or Alzheimer disease?   · Do you drink alcohol?  · Do you wear athletic shoes or slippers, or go barefoot at home?  You can help prevent falls  If you answered "yes" " to any of the above questions, you should take steps to reduce your risk of a fall. Monitoring health conditions and keeping walkways in your home free of clutter are just 2 ways. Changing is sometimes easier said than done. But keep in mind that even small changes can make you less likely to fall.  The fear of falling  It's normal to be scared of falling, especially if you've fallen before. But being afraid can actually make you more likely to fall. This is because:  · Fear might cause you to become less active. Being less active can lead to a loss of strength and balance.  · Fear can lead to isolation from others, depression, or the use of more medicines or alcohol. And all these things make falling even more likely.  To break the cycle, learn more about ways to avoid falling. As you take control, you may find yourself feeling less afraid.   Date Last Reviewed: 6/12/2015  © 9529-0892 [employer]. [address] [place] [address]. All rights reserved. This information is not intended as a substitute for professional medical care. Always follow your healthcare professional's instructions.        Exercises to Prevent Falls  Certain types of exercises may help make you less likely to fall. Try the ones below. Or do other exercises that your health care provider suggests. Depending on your health, you may need to start slowly. Don't let that stop you. Even small amounts of exercise can help you. Be sure to talk to your health care provider before starting any exercise program.       Improve balance  Many types of exercise can help improve balance. Jose Roberto chi and yoga are good examples. Here's another one to try. You can do it anytime and almost anywhere.  · Stand next to a counter or solid support.  · Push yourself up onto your tiptoes.  · Hold for 5 seconds. If you start to lose your balance, hold on to the counter.  · Rest and repeat 5 times. Work up to holding for 20 to 30 seconds, if you can.  "Increase flexibility  Being more flexible makes it easier for you to move around safely. Try exercises like the seated hamstring stretch.  · Sit in a chair and put one foot on a stool.  · Straighten your leg and reach with both hands down either side of your leg. Reach as far down your leg as you can.  · Hold for about 20 seconds.  · Go back to the starting position. Then repeat 5 times. Switch legs. Build strength  "Resistance" exercises help build strength. You can do them without equipment. Or you can use weights, elastic bands, or special machines. One such exercise is called the biceps curl. You can hold a 1-pound weight or even a can of soup. Do this exercise at least 3 times a week. Strive for every day.  · Sit up straight in a chair.  · Keep your elbow close to your body and your wrist straight.  · Bend your arm, moving your hand up to your shoulder. Then slowly lower your arm.  · Repeat 5 times. Switch to the other arm.   Build your staying power  Aerobic exercises make your heart and lungs stronger so you can keep moving longer. Walking and swimming are two of the best types of exercises you can do. Using a stationary bike is great, too. Find an aerobic exercise that you enjoy. Start slowly and build up. Even 5 minutes is helpful. Aim for a goal of 30 minutes, at least 3 times a week. You don't have to do 30 minutes in 1 session. Break it up and walk a little throughout the day.  More helpful tips  · Start easy. Slowly work up to doing more.  · Talk with your health care provider about the best exercises for you.  · Call senior centers or health clubs about exercise programs.  · If needed, have a family member watch you walk every so often to check your stability.  · Exercise with a friend. Choose an activity you both enjoy.  · Consider garth chi or yoga to strengthen your balance.  · Try exercises that you can do anytime, anywhere. Here are 2 examples. Have someone with you when you first try " these:  ¨ Practice walking by placing 1 foot right in front of the other.  ¨ Stand up and sit down 10 times. Repeat this throughout the day.   Date Last Reviewed: 6/13/2015  © 9621-5423 [employer]. [address] [place] [address], [place] [address]. All rights reserved. This information is not intended as a substitute for professional medical care. Always follow your healthcare professional's instructions.        Uncertain Causes of Fall  You have had a fall today. But the cause of your fall is not certain. Falls can occur due to slipping, tripping or losing your balance. A fall can also occur from a fainting spell or seizure.  While a fall can happen for a simple reason (tripping over something), falls in elderly people are often caused by a combination of things:  · Age-related decline in function with worsening balance, stability, vision, and muscle strength  · Chronic illness such as heart arrhythmias, heart valve disease, vascular disease, COPD, diabetes, strokes, arthritis  · Effects or side effects of medicines  · Dehydration.  · Environmental hazards such as uneven or slippery ground, unfamiliar place, obstacles, uneven surfaces, or slippery ground  · Situational factors (related to the activity being done, e.g., rushing to the bathroom)  Because the cause of your fall today is not certain, it is possible that a fainting spell or seizure was the cause. This means that it could happen again, without warning. If you fall again, without a cause, then you should return to this facility promptly to have further tests. Otherwise, follow up with your doctor as explained below.  It is normal to feel sore and tight in your muscles and back the next day, and not just the muscles you initially injured. Remember, all the parts of your body are connected, so while initially one area hurts, the next day another may hurt. Also, when you injure yourself, it causes inflammation, which then causes the muscles to tighten  up and hurt more. After the initial worsening, it should gradually improve over the next few days. However, more severe pain should be reported.  Even without a definite head injury, you can still get a concussion. Concussions and even bleeding can still occur, especially if you have had a recent injury or take blood thinner medicine. It is not unusual to have a mild headache and feel tired and even nauseous or dizzy.  Home care  · Rest today and resume your normal activities as soon as you are feeling back to normal. It is best to remain with someone who can check on you for the next 24 hours to watch for another episode of falling.  · If you were injured during the fall, follow the advice from your doctor regarding care of your injury.  ·  If you become light-headed or dizzy, lie down immediately or sit and lean forward with your head down.  · As a precaution, do not drive a car or operate dangerous equipment, do not take a bath alone (use a shower instead) and do not swim alone until you see your doctor. A condition causing fainting or seizures must be ruled out before resuming these activities.  · You may use acetaminophen or ibuprofen to control pain, unless another pain medicine was prescribed. If you have chronic liver or kidney disease or ever had a stomach ulcer or gastrointestinal bleeding, talk with your doctor before using these medicines.  · Keep your appointments for any further testing that may have been scheduled for you.  Follow-up care  Follow up with your healthcare provider, or as advised.  If X-rays or CT scan were done, you will be notified if there is a change in the reading, especially if it affects treatment.  Call 911  Call 911 if any of these occur:  · Trouble breathing  · Confused or difficulty arousing  · Fainting or loss of consciousness  · Rapid or very slow heart rate  · Seizure  · Difficulty with speech or vision, weakness of an arm or leg  · Difficulty walking or talking, loss of  balance, numbness or weakness in one side of your body, facial droop  When to seek medical advice  Call your healthcare provider right away if any of these occur:  · Another unexplained fall  · Dizziness  · Severe headache  · Nausea and vomiting  · Blood in vomit, stools (black or red color)  Date Last Reviewed: 11/5/2015  © 0391-2399 [employer]. [address] [place] [address]. All rights reserved. This information is not intended as a substitute for professional medical care. Always follow your healthcare professional's instructions.        Understanding Parkinsons Disease    Parkinsons disease is a condition that affects control over your movements. Its caused by a lack of dopamine, a chemical that helps the nerve cells in your brain communicate with each other. When dopamine is missing from certain areas of the brain, the messages that tell your body how to move are lost or distorted. This can lead to symptoms such as shaking, stiffness, and slow movement. Theres no cure for Parkinsons disease. But proper treatment can help ease symptoms and allow you to live a full, active life.  Changes in the brain  Dopamine is produced in a small area of the brain called the substantia nigra. For reasons that arent yet clear, the nerve cells in this region that make dopamine begin to die. This means less dopamine is available to help control your movements. When healthy, the substantia nigra makes enough dopamine to help control your bodys movements.  Symptoms of Parkinsons disease  Parkinsons symptoms often appear gradually. Some may take years to develop. Others you may not have at all. Below are the most common:  · Shaking (resting tremor) can affect the hands, arms, and legs. Most often, the shaking is worse on one side of the body. It usually lessens when the limb is used.  · Slow movement (bradykinesia) can affect the whole body. People may walk with short, shuffling steps. They can  also feel frozen and unable to move.  · Stiffness (rigidity) occurs when muscles dont relax. It can cause muscle aches and stooped posture.  · Other symptoms include balance problems, small handwriting, soft voice volume, constipation, reduced or flat facial expression, and sleep problems. Memory loss or other problems with thinking may also occur later in the progression of the disease.  How is Parkinsons diagnosed?  There is no single test for Parkinsons disease. The diagnosis is based on your symptoms, medical history, and a physical exam. You may also have tests to help rule out other problems. These may include blood tests to look for diseases that cause similar symptoms. They can also include brain-imaging tests, such as an MRI of the brain     Parkinsons disease or Parkinsonism?  Parkinsonism is the name for a group of brain conditions that all have symptoms similar to Parkinsons disease. However, the causes of these symptoms are different. In some cases, Parkinson-type symptoms may result from strokes or head injury. They can also be caused by medicines or other diseases that affect the brain. In general, these conditions cannot be treated as well using the medicines that help people with Parkinsons disease.   Date Last Reviewed: 9/26/2015  © 2819-2211 [employer]. [address] [place] [address]. All rights reserved. This information is not intended as a substitute for professional medical care. Always follow your healthcare professional's instructions.        Parkinsons Disease: Home Safety     Removing throw rugs can help prevent trips and falls.     As Parkinsons disease progresses, home safety will be an increasing concern. This page includes tips that can help make your daily life safer and easier. Your doctor may also recommend a therapist to advise you on the best ways to set up your home.  Setting up living spaces  Get help from family and friends to make these  changes:  · Keep walkways open and free of clutter. Move phone and electrical cords out of the way. Remove throw rugs to prevent trips.  · Get a cordless or speaker phone. Program numbers for family and emergency services.  · Make sure rooms are well lit. Install nightlights along walkways.  · If freezing at doorways is a problem, consider placing lines of tape on the floor between rooms. Stepping over the tape may prompt you to keep moving.  Setting up the bathroom  Use the tips below to make changes to your bathroom. Medicare or insurance may help cover the costs of some of these items, depending on your particular needs and plan.  · Have grab bars put in the shower or tub for support getting in and out.  · Install a hand-held showerhead for easier bathing.  · Raise the height of the toilet with a commode chair or elevated toilet seat.  · Use a rubber-backed bath mat to help prevent slips and falls.  · Buy a shower seat to make bathing safer and less tiring.     Preventing falls  Parkinsons symptoms make falls more likely. Safety improvements around the house can help. But if you begin having frequent falls, talk to your doctor. He or she may recommend physical therapy. This helps you learn the safest ways to move around. If needed, your therapist may also teach you how to use a cane or walker. Consider buying a life line so that if you do fall while you are alone, you'll have a way to get help.   Date Last Reviewed: 11/9/2015  © 3949-0925 [employer]. [address] [place] [address], [place] [address]. All rights reserved. This information is not intended as a substitute for professional medical care. Always follow your healthcare professional's instructions.

## 2019-12-08 DIAGNOSIS — I10 ESSENTIAL HYPERTENSION: ICD-10-CM

## 2019-12-08 DIAGNOSIS — I95.1 ORTHOSTATIC HYPOTENSION: ICD-10-CM

## 2019-12-08 DIAGNOSIS — E78.5 HLD (HYPERLIPIDEMIA): ICD-10-CM

## 2019-12-08 DIAGNOSIS — J45.21 ASTHMA, CHRONIC, MILD INTERMITTENT, WITH ACUTE EXACERBATION: ICD-10-CM

## 2019-12-08 RX ORDER — ATORVASTATIN CALCIUM 20 MG/1
TABLET, FILM COATED ORAL
Qty: 30 TABLET | Refills: 0 | Status: SHIPPED | OUTPATIENT
Start: 2019-12-08

## 2019-12-09 NOTE — PROGRESS NOTES
Subjective:       Patient ID: Maedline Reilly is a 73 y.o. female.    Chief Complaint: Blurred Vision and Dizziness  Past medical history significant for labile hypertension, possible endocrine disorder for which she is under workup   by endocrinology, and Parkinson's disease  HPI see above  Review of Systems   Constitutional: Positive for activity change and unexpected weight change.   HENT: Negative for hearing loss, rhinorrhea and trouble swallowing.    Eyes: Positive for visual disturbance. Negative for discharge.   Respiratory: Negative for wheezing.    Cardiovascular: Negative for chest pain and palpitations.   Gastrointestinal: Negative for blood in stool, constipation, diarrhea and vomiting.   Endocrine: Negative for polydipsia and polyuria.   Genitourinary: Negative for difficulty urinating, dysuria, hematuria and menstrual problem.   Musculoskeletal: Negative for arthralgias, joint swelling and neck pain.   Neurological: Negative for weakness and headaches.   Psychiatric/Behavioral: Positive for confusion. Negative for dysphoric mood.       Objective:      Physical Exam   Constitutional: She is oriented to person, place, and time. She appears well-developed and well-nourished. No distress.   HENT:   Head: Normocephalic and atraumatic.   Right Ear: External ear normal.   Left Ear: External ear normal.   Nose: Nose normal.   Mouth/Throat: Oropharynx is clear and moist.   Eyes: Pupils are equal, round, and reactive to light. Conjunctivae and EOM are normal.   Neck: Normal range of motion. Neck supple. No JVD present.   Cardiovascular: Normal rate, regular rhythm, normal heart sounds and intact distal pulses.   No murmur heard.  Pulmonary/Chest: Effort normal and breath sounds normal. No stridor. No respiratory distress. She has no wheezes. She has no rales.   Abdominal: Soft. Bowel sounds are normal. She exhibits no distension and no mass. There is no tenderness. There is no rebound and no guarding.    Musculoskeletal: Normal range of motion. She exhibits no edema, tenderness or deformity.   Neurological: She is alert and oriented to person, place, and time. She displays normal reflexes. No cranial nerve deficit or sensory deficit. She exhibits normal muscle tone. Coordination normal.   Skin: Skin is warm and dry. Capillary refill takes less than 2 seconds. No rash noted. She is not diaphoretic. No erythema. No pallor.   Psychiatric: She has a normal mood and affect. Her behavior is normal. Judgment and thought content normal.   Nursing note and vitals reviewed.      Assessment:       1. Falls frequently    2. Labile hypertension    3. Hypertensive chronic kidney disease with stage 1 through stage 4 chronic kidney disease, or unspecified chronic kidney disease         Plan:     Madeline was seen today for blurred vision and dizziness.    Diagnoses and all orders for this visit:    Falls frequently  -     Holter monitor - 48 hour; Future  -     Ambulatory referral to Nutrition Services    Labile hypertension  -     SUBSEQUENT HOME HEALTH ORDERS  -     METANEPHRINES, PLASMA FREE; Future  -     Ambulatory referral to Nutrition Services    Hypertensive chronic kidney disease with stage 1 through stage 4 chronic kidney disease, or unspecified chronic kidney disease   -     Ambulatory referral to Nutrition Services      Will contact the patient results of pending test when available recommend patient follow up with endocrinology as soon as possible   recommend PT chin keep her appointment with Neurology

## 2019-12-11 ENCOUNTER — CLINICAL SUPPORT (OUTPATIENT)
Dept: CARDIOLOGY | Facility: CLINIC | Age: 74
End: 2019-12-11
Attending: FAMILY MEDICINE
Payer: MEDICARE

## 2019-12-11 DIAGNOSIS — R29.6 FALLS FREQUENTLY: ICD-10-CM

## 2019-12-11 PROCEDURE — 93224 XTRNL ECG REC UP TO 48 HRS: CPT | Mod: S$GLB,,, | Performed by: INTERNAL MEDICINE

## 2019-12-11 PROCEDURE — 93224 HOLTER MONITOR - 48 HOUR (CUPID ONLY): ICD-10-PCS | Mod: S$GLB,,, | Performed by: INTERNAL MEDICINE

## 2019-12-16 LAB
OHS CV EVENT MONITOR DAY: 0
OHS CV HOLTER LENGTH DECIMAL HOURS: 48
OHS CV HOLTER LENGTH HOURS: 48
OHS CV HOLTER LENGTH MINUTES: 0

## 2019-12-18 ENCOUNTER — PATIENT OUTREACH (OUTPATIENT)
Dept: ADMINISTRATIVE | Facility: OTHER | Age: 74
End: 2019-12-18

## 2019-12-19 ENCOUNTER — OFFICE VISIT (OUTPATIENT)
Dept: NEUROLOGY | Facility: CLINIC | Age: 74
End: 2019-12-19
Payer: MEDICARE

## 2019-12-19 VITALS
DIASTOLIC BLOOD PRESSURE: 99 MMHG | HEART RATE: 66 BPM | BODY MASS INDEX: 24.84 KG/M2 | HEIGHT: 65 IN | SYSTOLIC BLOOD PRESSURE: 211 MMHG

## 2019-12-19 DIAGNOSIS — R41.3 MEMORY DEFICIT: Primary | ICD-10-CM

## 2019-12-19 DIAGNOSIS — G20.C PARKINSONISM, UNSPECIFIED PARKINSONISM TYPE: ICD-10-CM

## 2019-12-19 DIAGNOSIS — R41.3 MEMORY CHANGE: ICD-10-CM

## 2019-12-19 PROCEDURE — 3080F DIAST BP >= 90 MM HG: CPT | Mod: CPTII,S$GLB,, | Performed by: PSYCHIATRY & NEUROLOGY

## 2019-12-19 PROCEDURE — 99999 PR PBB SHADOW E&M-EST. PATIENT-LVL III: CPT | Mod: PBBFAC,,, | Performed by: PSYCHIATRY & NEUROLOGY

## 2019-12-19 PROCEDURE — 99999 PR PBB SHADOW E&M-EST. PATIENT-LVL III: ICD-10-PCS | Mod: PBBFAC,,, | Performed by: PSYCHIATRY & NEUROLOGY

## 2019-12-19 PROCEDURE — 99215 PR OFFICE/OUTPT VISIT, EST, LEVL V, 40-54 MIN: ICD-10-PCS | Mod: S$GLB,,, | Performed by: PSYCHIATRY & NEUROLOGY

## 2019-12-19 PROCEDURE — 1159F PR MEDICATION LIST DOCUMENTED IN MEDICAL RECORD: ICD-10-PCS | Mod: S$GLB,,, | Performed by: PSYCHIATRY & NEUROLOGY

## 2019-12-19 PROCEDURE — 1101F PR PT FALLS ASSESS DOC 0-1 FALLS W/OUT INJ PAST YR: ICD-10-PCS | Mod: CPTII,S$GLB,, | Performed by: PSYCHIATRY & NEUROLOGY

## 2019-12-19 PROCEDURE — 1159F MED LIST DOCD IN RCRD: CPT | Mod: S$GLB,,, | Performed by: PSYCHIATRY & NEUROLOGY

## 2019-12-19 PROCEDURE — 3080F PR MOST RECENT DIASTOLIC BLOOD PRESSURE >= 90 MM HG: ICD-10-PCS | Mod: CPTII,S$GLB,, | Performed by: PSYCHIATRY & NEUROLOGY

## 2019-12-19 PROCEDURE — 3077F PR MOST RECENT SYSTOLIC BLOOD PRESSURE >= 140 MM HG: ICD-10-PCS | Mod: CPTII,S$GLB,, | Performed by: PSYCHIATRY & NEUROLOGY

## 2019-12-19 PROCEDURE — 1101F PT FALLS ASSESS-DOCD LE1/YR: CPT | Mod: CPTII,S$GLB,, | Performed by: PSYCHIATRY & NEUROLOGY

## 2019-12-19 PROCEDURE — 99215 OFFICE O/P EST HI 40 MIN: CPT | Mod: S$GLB,,, | Performed by: PSYCHIATRY & NEUROLOGY

## 2019-12-19 PROCEDURE — 3077F SYST BP >= 140 MM HG: CPT | Mod: CPTII,S$GLB,, | Performed by: PSYCHIATRY & NEUROLOGY

## 2019-12-19 PROCEDURE — 1126F PR PAIN SEVERITY QUANTIFIED, NO PAIN PRESENT: ICD-10-PCS | Mod: S$GLB,,, | Performed by: PSYCHIATRY & NEUROLOGY

## 2019-12-19 PROCEDURE — 1126F AMNT PAIN NOTED NONE PRSNT: CPT | Mod: S$GLB,,, | Performed by: PSYCHIATRY & NEUROLOGY

## 2019-12-19 RX ORDER — DONEPEZIL HYDROCHLORIDE 5 MG/1
5 TABLET, FILM COATED ORAL NIGHTLY
Qty: 30 TABLET | Refills: 11 | Status: SHIPPED | OUTPATIENT
Start: 2019-12-19 | End: 2020-12-18

## 2019-12-19 NOTE — ASSESSMENT & PLAN NOTE
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  PDism well controlled with carbidopa/levodopa 25/100mg 1 tab PO TID.   Suggested aggressive PT. I suggested she bring in her brain MRI for review to shed light on her symptoms.

## 2019-12-19 NOTE — PROGRESS NOTES
"MOVEMENT DISORDERS CLINIC NEW CONSULT NOTE    PCP/Referring Provider: Aaareferral Self  No address on file  Date of Service: 12/19/2019    Chief Complaint: PDism    Interval Hx  Since last visit,   Stopped clonidine due to orthostasis and feels better in that regard. Still feels dizzy on standing at least once a day but no more syncope.  She brought in her pressure readings from home and these run systolics 80's to 160's - sitting  Started salting her foods  Uses an abdominal binder off and ON    Starts carbidopa/levodopa 25/100mg 1 tab PO TID several months ago.  Did not increase hallucinations but these come every other day.  No nausea noted.  Feels like her walking has improved mildly.  Uses a treadmill for 3 miles    One fall since last visit  Does not use a cane to ambulate    Did not yet bring her MRI brain disc in for review  PD Review of Symptoms:  Anosmia: Anosmia  Dysarthria/Hypophonia: Hoarseness for 2 months that comes and goes  Dysphagia/Sialorrhea: None  Hallucinations as above- nonfrightening  Depression: Better after carbidopa/levodopa however down off and on - no SI  Cognitive slowing: Memory decline since 9 months - forgets what she's saying mid-sentence - forgets items around the house- her children are now shopping for her - stopped cooking as much  Impulsivity: None  Urinary changes: frequency  Constipation: None  Orthostasis: As above  Falls: as above  Freezing: None  Micrographia: None  Sleep issues:  -ELVIA: unknown  -RBD: for 1 year   -Sleep Quality: Naps    "PriorHPI: Madeline Reilly is a R HANDED 74 y.o. female with a medical issues significant for HTN, Asthma, GERD, Orthostatic hypotension (for 12 years), who presents for 2nd opinion Re PDism per PCP. This year she noticed a resting tremor in her R hand. She also notes falls since march. She notes most of her falls are at night after standing and syncopizing or near-syncopizing. She's had 12 total falls. She feels dizzy on standing most " "times. She notes her BP can spike to the 190's rarely. She does not require a cane to walk. She can walk 2 blocks before she becomes scared of falling. Stairs are challenging to her.    She sees Dr. Abdi for BP management.    She does find herself gasping at times.  She has been taking C/L 25/100mg Po BID  No nausea    No family hx PD"    Review of Systems:   Review of Systems   Constitutional: Negative for fever.   HENT: Negative for congestion.    Eyes: Negative for double vision.   Respiratory: Negative for cough and shortness of breath.    Cardiovascular: Negative for chest pain and leg swelling.   Gastrointestinal: Negative for nausea.   Genitourinary: Negative for dysuria.   Musculoskeletal: Positive for falls.   Skin: Negative for rash.   Neurological: Positive for dizziness, tremors and speech change. Negative for headaches.   Psychiatric/Behavioral: Positive for depression and memory loss.       Neuroleptic exposure:  None    Current Medications:  Outpatient Encounter Medications as of 12/19/2019   Medication Sig Dispense Refill    ADVAIR DISKUS 500-50 mcg/dose DsDv diskus inhaler USE 1 INHALATION PO BID  3    ALBUTEROL SULFATE (VENTOLIN INHL) Inhale into the lungs every 4 (four) hours as needed.      atorvastatin (LIPITOR) 20 MG tablet TAKE 1 TABLET(20 MG) BY MOUTH EVERY DAY 30 tablet 0    azelastine (ASTELIN) 137 mcg (0.1 %) nasal spray SPRAY TWICE INTO EACH NOSTRIL ONCE  Q DAY  4    calcium-vitamin D 500-125 mg-unit tablet Take 1 tablet by mouth once daily.       carbidopa-levodopa  mg (SINEMET)  mg per tablet Take 1 tablet by mouth 3 (three) times daily. 90 tablet 11    cholecalciferol, vitamin D3, (VITAMIN D3) 25 mcg (1,000 unit) capsule Take 1,000 Units by mouth.      famotidine-calcium carbonate-magnesium hydroxide (PEPCID COMPLETE) chewable tablet Take 1 tablet by mouth.      famotidine-calcium carbonate-magnesium hydroxide (PEPCID COMPLETE) chewable tablet Take 1 tablet by " mouth daily as needed. 60 tablet 12    fexofenadine (ALLEGRA) 60 MG tablet Take 60 mg by mouth once as needed.       ipratropium (ATROVENT) 0.06 % nasal spray SPRAY TWICE IN EACH NOSTRIL BID  4    loperamide (IMODIUM) 2 mg capsule TK ONE C PO  QID PRN  11    omega-3s-dha-epa-fish oil 200 mg-300 mg- 100 mg-1,000 mg Cap Take by mouth.      amLODIPine (NORVASC) 5 MG tablet Take one tablet every morning. Take an additional tablet every afternoon if BP is >140/>90 (Patient not taking: Reported on 12/19/2019) 30 tablet 0    donepezil (ARICEPT) 5 MG tablet Take 1 tablet (5 mg total) by mouth every evening. 30 tablet 11     No facility-administered encounter medications on file as of 12/19/2019.        Past Medical History:  Patient Active Problem List   Diagnosis    Asthma, chronic    Orthostatic hypotension    Hypertension    HLD (hyperlipidemia)    Micturition syncope    Vasovagal syncope    Parkinsonism    Memory change       Past Surgical History:  Past Surgical History:   Procedure Laterality Date    CATARACT EXTRACTION, BILATERAL      PARTIAL HYSTERECTOMY  1981       Current Living Situation: home    Social:  Social History     Socioeconomic History    Marital status:      Spouse name: Not on file    Number of children: Not on file    Years of education: Not on file    Highest education level: Not on file   Occupational History    Not on file   Social Needs    Financial resource strain: Not on file    Food insecurity:     Worry: Not on file     Inability: Not on file    Transportation needs:     Medical: Not on file     Non-medical: Not on file   Tobacco Use    Smoking status: Never Smoker    Smokeless tobacco: Never Used   Substance and Sexual Activity    Alcohol use: No    Drug use: No    Sexual activity: Not on file   Lifestyle    Physical activity:     Days per week: Not on file     Minutes per session: Not on file    Stress: Not on file   Relationships    Social  "connections:     Talks on phone: Not on file     Gets together: Not on file     Attends [employer] service: Not on file     Active member of club or organization: Not on file     Attends meetings of clubs or organizations: Not on file     Relationship status: Not on file   Other Topics Concern    Not on file   Social History Narrative    Retired .       Family History:  As above    PHYSICAL:  BP (!) 211/99 (BP Location: Left arm, Patient Position: Sitting)   Pulse 66   Ht 5' 5" (1.651 m)   BMI 24.84 kg/m²   Standing 143/84    She is OFF    General Medical Examination:  General: Good hygiene, appropriate appearance.  HEENT: Normocephalic, atraumatic.   Neck: Supple.   Chest: Unlabored breathing.   CV: Symmetric pulses.   Ext: No clubbing, cyanosis, or edema.     Mental Status:  Mood/Affect: Appropriate/congruent.  Level of consciousness: Awake, alert.  Orientation: Oriented to person, place, time and situation.  Language: No Dysarthria    Cranial nerves:  I: Not tested  II: PERRL, VFF to counting  III, IV, VI: EOMI with conjugate gaze and no nystagmus on end gaze  V: Facial sensation intact and symmetric over the bilateral V1-V3  VII: Facial muscle activation intact and symmetric over the bilateral upper and lower face  VIII: Hearing intact in the b/l ears and symmetrical to finger rub  IX, X, XII: TUP midline - no atrophy or fasiculations  X: SCMs and shoulder shrug full strength b/l and symmetric  R facial hypomimia  Mild hypophonia    Motor:   -UE: 5/5 deltoids; 5/5 biceps, triceps; 5/5 wrist flexors, extensors; 5/5 interosseous; 5/5   -LEs: 5/5 hip flexion, extension; 5/5 knee flexion, extension; 5/5 ankle flexion, extension    DTRs:  ? Biceps Triceps Brachioradialis Knee Ankle   Left 2+ 2+ 2+ 2+ 2+   Right 2+ 2+ 2+ 2+ 2+     ? Finger taps Finger flicks MAGY Heel taps   Left 0 0 0 0   Right 1 1 0 1   Neck tone: nl  ? Arm Leg   Left 0 0   Right 1 1     Sensation:   -Light touch: Intact and " symmetric in the bilateral upper and lower extremities.  -Temp: Intact and symmetric in the bilateral upper and lower extremities.  -Vibration: Decreased to knee    Coordination:   -Finger to nose: nl    Gait:  Mildly slow in all movements  -Arises from chair without use of hands.  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  PDism well controlled with carbidopa/levodopa 25/100mg 1 tab PO TID.   Suggested aggressive PT. I suggested she bring in her brain MRI for review to shed light on her symptoms.         Memory change    Current Assessment & Plan     Advancing memory decline with hallucinations. Suggested neuropsych testing but she declined. Suggested Aricerpt 5mg QHS.           Other Visit Diagnoses     Memory deficit    -  Primary    Relevant Orders    VITAMIN B1    RPR          Follow-up: 1 mo  Discussed the importance of ongoing exercise in efforts to improve mobility and balance.  Suggested a diet high in antioxidants such as berries and green tea.    Jana Vasquez MD, MS [facility]  Department of Neurology  Movement Disorders

## 2019-12-19 NOTE — ASSESSMENT & PLAN NOTE
Advancing memory decline with hallucinations. Suggested neuropsych testing but she declined. Suggested Aricerpt 5mg QHS.

## 2019-12-20 ENCOUNTER — OFFICE VISIT (OUTPATIENT)
Dept: PRIMARY CARE CLINIC | Facility: CLINIC | Age: 74
End: 2019-12-20
Payer: MEDICARE

## 2019-12-20 VITALS
BODY MASS INDEX: 25.23 KG/M2 | SYSTOLIC BLOOD PRESSURE: 105 MMHG | OXYGEN SATURATION: 96 % | HEIGHT: 65 IN | DIASTOLIC BLOOD PRESSURE: 60 MMHG | TEMPERATURE: 98 F | WEIGHT: 151.44 LBS | RESPIRATION RATE: 19 BRPM | HEART RATE: 65 BPM

## 2019-12-20 DIAGNOSIS — R26.89 BALANCE DISORDER: ICD-10-CM

## 2019-12-20 DIAGNOSIS — R29.6 FREQUENT FALLS: ICD-10-CM

## 2019-12-20 DIAGNOSIS — G20.A1 PARKINSON'S DISEASE: Primary | ICD-10-CM

## 2019-12-20 PROCEDURE — 3078F DIAST BP <80 MM HG: CPT | Mod: CPTII,S$GLB,, | Performed by: FAMILY MEDICINE

## 2019-12-20 PROCEDURE — 1126F PR PAIN SEVERITY QUANTIFIED, NO PAIN PRESENT: ICD-10-PCS | Mod: S$GLB,,, | Performed by: FAMILY MEDICINE

## 2019-12-20 PROCEDURE — 1159F MED LIST DOCD IN RCRD: CPT | Mod: S$GLB,,, | Performed by: FAMILY MEDICINE

## 2019-12-20 PROCEDURE — 1101F PR PT FALLS ASSESS DOC 0-1 FALLS W/OUT INJ PAST YR: ICD-10-PCS | Mod: CPTII,S$GLB,, | Performed by: FAMILY MEDICINE

## 2019-12-20 PROCEDURE — 3074F SYST BP LT 130 MM HG: CPT | Mod: CPTII,S$GLB,, | Performed by: FAMILY MEDICINE

## 2019-12-20 PROCEDURE — 1159F PR MEDICATION LIST DOCUMENTED IN MEDICAL RECORD: ICD-10-PCS | Mod: S$GLB,,, | Performed by: FAMILY MEDICINE

## 2019-12-20 PROCEDURE — 3074F PR MOST RECENT SYSTOLIC BLOOD PRESSURE < 130 MM HG: ICD-10-PCS | Mod: CPTII,S$GLB,, | Performed by: FAMILY MEDICINE

## 2019-12-20 PROCEDURE — 99999 PR PBB SHADOW E&M-EST. PATIENT-LVL IV: ICD-10-PCS | Mod: PBBFAC,,, | Performed by: FAMILY MEDICINE

## 2019-12-20 PROCEDURE — 99214 OFFICE O/P EST MOD 30 MIN: CPT | Mod: S$GLB,,, | Performed by: FAMILY MEDICINE

## 2019-12-20 PROCEDURE — 3078F PR MOST RECENT DIASTOLIC BLOOD PRESSURE < 80 MM HG: ICD-10-PCS | Mod: CPTII,S$GLB,, | Performed by: FAMILY MEDICINE

## 2019-12-20 PROCEDURE — 1126F AMNT PAIN NOTED NONE PRSNT: CPT | Mod: S$GLB,,, | Performed by: FAMILY MEDICINE

## 2019-12-20 PROCEDURE — 99214 PR OFFICE/OUTPT VISIT, EST, LEVL IV, 30-39 MIN: ICD-10-PCS | Mod: S$GLB,,, | Performed by: FAMILY MEDICINE

## 2019-12-20 PROCEDURE — 1101F PT FALLS ASSESS-DOCD LE1/YR: CPT | Mod: CPTII,S$GLB,, | Performed by: FAMILY MEDICINE

## 2019-12-20 PROCEDURE — 99999 PR PBB SHADOW E&M-EST. PATIENT-LVL IV: CPT | Mod: PBBFAC,,, | Performed by: FAMILY MEDICINE

## 2019-12-21 ENCOUNTER — PATIENT MESSAGE (OUTPATIENT)
Dept: PRIMARY CARE CLINIC | Facility: CLINIC | Age: 74
End: 2019-12-21

## 2019-12-21 ENCOUNTER — PATIENT MESSAGE (OUTPATIENT)
Dept: NEUROLOGY | Facility: CLINIC | Age: 74
End: 2019-12-21

## 2019-12-27 NOTE — PROGRESS NOTES
Subjective:       Patient ID: Madeline Reilly is a 74 y.o. female.    Chief Complaint: Follow-up   The patient's blood pressures have stabilized she infrequently his syncopal episodes.  Patient recently saw Neurology she has Parkinson's disease and has been warned of the syncopal episodes will probably continue  HPI see above  Review of Systems   Constitutional: Negative.    HENT: Negative.    Eyes: Negative.    Respiratory: Negative.    Cardiovascular: Negative.    Gastrointestinal: Negative.    Endocrine: Negative.    Genitourinary: Negative.    Musculoskeletal: Negative.    Skin: Negative.    Allergic/Immunologic: Negative.    Neurological: Positive for dizziness, syncope and weakness.   Hematological: Negative.    Psychiatric/Behavioral: Negative.        Objective:      Physical Exam   Constitutional: She is oriented to person, place, and time. She appears well-developed and well-nourished. She appears distressed.   HENT:   Head: Normocephalic and atraumatic.   Right Ear: External ear normal.   Left Ear: External ear normal.   Nose: Nose normal.   Mouth/Throat: Oropharynx is clear and moist.   Eyes: Pupils are equal, round, and reactive to light. Conjunctivae and EOM are normal.   Neck: Normal range of motion. Neck supple. No JVD present.   Cardiovascular: Normal rate, regular rhythm, normal heart sounds and intact distal pulses.   Pulmonary/Chest: Effort normal and breath sounds normal.   Abdominal: Soft. Bowel sounds are normal.   Musculoskeletal: Normal range of motion.   Neurological: She is alert and oriented to person, place, and time. She displays normal reflexes. No cranial nerve deficit or sensory deficit. She exhibits normal muscle tone. Coordination normal.   Skin: Skin is warm and dry. Capillary refill takes less than 2 seconds.   Psychiatric: She has a normal mood and affect. Her behavior is normal. Judgment and thought content normal.   Nursing note and vitals reviewed.      Assessment:       1.  Parkinson's disease    2. Frequent falls    3. Balance disorder        Plan:   Madeline was seen today for follow-up.    Diagnoses and all orders for this visit:    Parkinson's disease  -     Cancel: Ambulatory consult to Physical Therapy  -     Ambulatory consult to Physical Therapy    Frequent falls  -     Ambulatory consult to Physical Therapy    Balance disorder  -     Ambulatory consult to Physical Therapy

## 2020-01-06 ENCOUNTER — TELEPHONE (OUTPATIENT)
Dept: PRIMARY CARE CLINIC | Facility: CLINIC | Age: 75
End: 2020-01-06

## 2020-01-06 DIAGNOSIS — G20.A1 PARKINSON'S DISEASE: Primary | ICD-10-CM

## 2020-01-06 NOTE — TELEPHONE ENCOUNTER
Please see message and advise      pt would like to get a referral for physical therapy sent to [facility]     ----- Message from Cely Delarosa sent at 1/6/2020 11:04 AM CST -----  Contact: Pt self Home [phone]  Patient would like to get a referral.  Referral to what specialty:  Physical therapy  Does the patient want the referral with a specific physician:  N/A  Is the specialist an [facility] or [facility]:  Non [facility] doctor  Reason (be specific):  Patient said that she's off balance and she have parkinson's disease   Does the patient already have the specialty clinic appointment scheduled:  [facility]  If yes, what date is the appointment scheduled:   N/A  Is the insurance listed in Epic correct? (this is important for a referral):  Yes

## 2020-01-07 ENCOUNTER — PATIENT MESSAGE (OUTPATIENT)
Dept: NEUROLOGY | Facility: CLINIC | Age: 75
End: 2020-01-07

## 2020-01-07 ENCOUNTER — TELEPHONE (OUTPATIENT)
Dept: PRIMARY CARE CLINIC | Facility: CLINIC | Age: 75
End: 2020-01-07

## 2020-01-07 ENCOUNTER — PATIENT MESSAGE (OUTPATIENT)
Dept: PRIMARY CARE CLINIC | Facility: CLINIC | Age: 75
End: 2020-01-07

## 2020-01-07 DIAGNOSIS — I95.1 ORTHOSTATIC HYPOTENSION: Primary | ICD-10-CM

## 2020-01-07 DIAGNOSIS — R29.6 FALLS FREQUENTLY: Primary | ICD-10-CM

## 2020-01-07 NOTE — TELEPHONE ENCOUNTER
Referral for physical therapy was faxed over to [facility] on 1/7/20    ----- Message from Navdeep Zuñiga sent at 1/7/2020  9:00 AM CST -----  Good Morning,     I let patient know that we don't have a closer location. She states she has been seen externally by [facility]. Per her request, can you fax orders there? I looked up the fax number and it is [phone]...     Thanks!  ----- Message -----  From: Cynthia Ovalle MA  Sent: 1/6/2020   3:42 PM CST  To: Navdeep Zuñiga    No we do not know of a closer location      ----- Message -----  From: Navdeep Zuñiga  Sent: 1/6/2020   3:28 PM CST  To: Jesús Cutler    Good Afternoon,     This pt wants a location that is closer to home. She will need to see a neuro PT but the closest on we have is our [address] location for this type of service. Is there any locations that you know of that can service this patient?    Thanks!

## 2020-01-08 ENCOUNTER — TELEPHONE (OUTPATIENT)
Dept: PRIMARY CARE CLINIC | Facility: CLINIC | Age: 75
End: 2020-01-08

## 2020-01-08 DIAGNOSIS — G20.A1 PARKINSON'S DISEASE: ICD-10-CM

## 2020-01-08 DIAGNOSIS — R29.6 FREQUENT FALLS: Primary | ICD-10-CM

## 2020-01-08 DIAGNOSIS — R29.6 FALLS FREQUENTLY: Primary | ICD-10-CM

## 2020-01-08 NOTE — TELEPHONE ENCOUNTER
Please see message and advise     ----- Message from Libby Carey sent at 1/8/2020 11:51 AM CST -----  Contact: Patient [phone]  Patient wants to know if an order can be put in for her to get a walking cane.    Please call and advise.    Thank You

## 2020-01-08 NOTE — TELEPHONE ENCOUNTER
This message is in response to the conversation that you guys were having via the portal on 12/21/19.

## 2020-01-27 ENCOUNTER — TELEPHONE (OUTPATIENT)
Dept: PRIMARY CARE CLINIC | Facility: CLINIC | Age: 75
End: 2020-01-27

## 2020-01-27 NOTE — TELEPHONE ENCOUNTER
Just a follow up message from pt first call. Pt is scheduled to be seen on 1/31/20.    ----- Message from Mónica Cancino sent at 1/27/2020 10:31 AM CST -----  Contact: [phone]  / self   Calling back with additional informatiion form earlier message. She's presspering a lot with a strange order from body.  This all started Friday pass.

## 2020-01-27 NOTE — TELEPHONE ENCOUNTER
Please see message and advise     ----- Message from Kasey Mg sent at 1/27/2020  9:57 AM CST -----  Contact: Self   Patient would like to get medical advice.  Symptoms (please be specific):  Fecal incontinence, chills  How long has patient had these symptoms:  4 days  Pharmacy name and phone # (copy/paste from chart):  [id number] [place], [place] - [address] AT [facility] [address] [phone] (Phone)  [phone] (Fax)  Any drug allergies (copy/paste from chart):    See chart  Would the patient rather a call back or a response via [facility]?:  Call back  Comments:

## 2020-01-31 ENCOUNTER — OFFICE VISIT (OUTPATIENT)
Dept: PRIMARY CARE CLINIC | Facility: CLINIC | Age: 75
End: 2020-01-31
Payer: MEDICARE

## 2020-01-31 VITALS
SYSTOLIC BLOOD PRESSURE: 160 MMHG | HEIGHT: 65 IN | BODY MASS INDEX: 24.83 KG/M2 | HEART RATE: 60 BPM | TEMPERATURE: 98 F | WEIGHT: 149.06 LBS | DIASTOLIC BLOOD PRESSURE: 100 MMHG

## 2020-01-31 DIAGNOSIS — R29.818 NEUROLOGICAL DEFICIT PRESENT: ICD-10-CM

## 2020-01-31 DIAGNOSIS — G20.A1 PARKINSON'S DISEASE: Primary | ICD-10-CM

## 2020-01-31 DIAGNOSIS — R32 URINARY INCONTINENCE, UNSPECIFIED TYPE: ICD-10-CM

## 2020-01-31 PROCEDURE — 1159F MED LIST DOCD IN RCRD: CPT | Mod: S$GLB,,, | Performed by: FAMILY MEDICINE

## 2020-01-31 PROCEDURE — 99214 PR OFFICE/OUTPT VISIT, EST, LEVL IV, 30-39 MIN: ICD-10-PCS | Mod: S$GLB,,, | Performed by: FAMILY MEDICINE

## 2020-01-31 PROCEDURE — 1159F PR MEDICATION LIST DOCUMENTED IN MEDICAL RECORD: ICD-10-PCS | Mod: S$GLB,,, | Performed by: FAMILY MEDICINE

## 2020-01-31 PROCEDURE — 3080F DIAST BP >= 90 MM HG: CPT | Mod: CPTII,S$GLB,, | Performed by: FAMILY MEDICINE

## 2020-01-31 PROCEDURE — 99999 PR PBB SHADOW E&M-EST. PATIENT-LVL IV: CPT | Mod: PBBFAC,,, | Performed by: FAMILY MEDICINE

## 2020-01-31 PROCEDURE — 3077F SYST BP >= 140 MM HG: CPT | Mod: CPTII,S$GLB,, | Performed by: FAMILY MEDICINE

## 2020-01-31 PROCEDURE — 99999 PR PBB SHADOW E&M-EST. PATIENT-LVL IV: ICD-10-PCS | Mod: PBBFAC,,, | Performed by: FAMILY MEDICINE

## 2020-01-31 PROCEDURE — 3077F PR MOST RECENT SYSTOLIC BLOOD PRESSURE >= 140 MM HG: ICD-10-PCS | Mod: CPTII,S$GLB,, | Performed by: FAMILY MEDICINE

## 2020-01-31 PROCEDURE — 99214 OFFICE O/P EST MOD 30 MIN: CPT | Mod: S$GLB,,, | Performed by: FAMILY MEDICINE

## 2020-01-31 PROCEDURE — 1101F PR PT FALLS ASSESS DOC 0-1 FALLS W/OUT INJ PAST YR: ICD-10-PCS | Mod: CPTII,S$GLB,, | Performed by: FAMILY MEDICINE

## 2020-01-31 PROCEDURE — 3080F PR MOST RECENT DIASTOLIC BLOOD PRESSURE >= 90 MM HG: ICD-10-PCS | Mod: CPTII,S$GLB,, | Performed by: FAMILY MEDICINE

## 2020-01-31 PROCEDURE — 1101F PT FALLS ASSESS-DOCD LE1/YR: CPT | Mod: CPTII,S$GLB,, | Performed by: FAMILY MEDICINE

## 2020-02-03 ENCOUNTER — NUTRITION (OUTPATIENT)
Dept: NUTRITION | Facility: CLINIC | Age: 75
End: 2020-02-03
Payer: MEDICARE

## 2020-02-03 VITALS — WEIGHT: 149.06 LBS | BODY MASS INDEX: 24.83 KG/M2 | HEIGHT: 65 IN

## 2020-02-03 DIAGNOSIS — Z71.3 DIETARY COUNSELING: ICD-10-CM

## 2020-02-03 DIAGNOSIS — I15.1 HYPERTENSION SECONDARY TO OTHER RENAL DISORDERS: ICD-10-CM

## 2020-02-03 DIAGNOSIS — G20.A1 PARKINSON'S DISEASE: Primary | ICD-10-CM

## 2020-02-03 DIAGNOSIS — N18.1 STAGE 1 CHRONIC KIDNEY DISEASE: ICD-10-CM

## 2020-02-03 PROCEDURE — 97802 MEDICAL NUTRITION INDIV IN: CPT | Mod: S$GLB,,, | Performed by: DIETITIAN, REGISTERED

## 2020-02-03 PROCEDURE — 97802 PR MED NUTR THER, 1ST, INDIV, EA 15 MIN: ICD-10-PCS | Mod: S$GLB,,, | Performed by: DIETITIAN, REGISTERED

## 2020-02-03 PROCEDURE — 99999 PR PBB SHADOW E&M-EST. PATIENT-LVL III: CPT | Mod: PBBFAC,,,

## 2020-02-03 PROCEDURE — 99999 PR PBB SHADOW E&M-EST. PATIENT-LVL III: ICD-10-PCS | Mod: PBBFAC,,,

## 2020-02-03 NOTE — PROGRESS NOTES
"Referring Physician:Lorelei Espinosa*     Reason for visit:  Chief Complaint   Patient presents with    Chronic Kidney Disease    Hypertension    Nutrition Counseling    Parkinson's Disease      Initial Visit    :1945     Allergies Reviewed  Meds Reviewed    Anthropometrics  Weight:67.6 kg (149 lb 0.5 oz)  Height:5' 5" (1.651 m)  BMI:Body mass index is 24.8 kg/m².   IBW:   56.8 kg  +/-10%    Meds:  Outpatient Medications Prior to Visit   Medication Sig Dispense Refill    ADVAIR DISKUS 500-50 mcg/dose DsDv diskus inhaler USE 1 INHALATION PO BID  3    ALBUTEROL SULFATE (VENTOLIN INHL) Inhale into the lungs every 4 (four) hours as needed.      amLODIPine (NORVASC) 5 MG tablet Take one tablet every morning. Take an additional tablet every afternoon if BP is >140/>90 30 tablet 0    atorvastatin (LIPITOR) 20 MG tablet TAKE 1 TABLET(20 MG) BY MOUTH EVERY DAY 30 tablet 0    azelastine (ASTELIN) 137 mcg (0.1 %) nasal spray SPRAY TWICE INTO EACH NOSTRIL ONCE  Q DAY  4    calcium-vitamin D 500-125 mg-unit tablet Take 1 tablet by mouth once daily.       carbidopa-levodopa  mg (SINEMET)  mg per tablet Take 1 tablet by mouth 3 (three) times daily. 90 tablet 11    cholecalciferol, vitamin D3, (VITAMIN D3) 25 mcg (1,000 unit) capsule Take 1,000 Units by mouth.      donepezil (ARICEPT) 5 MG tablet Take 1 tablet (5 mg total) by mouth every evening. 30 tablet 11    famotidine-calcium carbonate-magnesium hydroxide (PEPCID COMPLETE) chewable tablet Take 1 tablet by mouth.      famotidine-calcium carbonate-magnesium hydroxide (PEPCID COMPLETE) chewable tablet Take 1 tablet by mouth daily as needed. 60 tablet 12    fexofenadine (ALLEGRA) 60 MG tablet Take 60 mg by mouth once as needed.       ipratropium (ATROVENT) 0.06 % nasal spray SPRAY TWICE IN EACH NOSTRIL BID  4    loperamide (IMODIUM) 2 mg capsule TK ONE C PO  QID PRN  11    omega-3s-dha-epa-fish oil 200 mg-300 mg- 100 mg-1,000 mg Cap " Take by mouth.       No facility-administered medications prior to visit.        Food/Drug Interactions Noted:  n/a    Vitamins/Supplements/Herbs:  Vit D, Vit E, omega-3 fish oil    Labs: 12/19/19 Thiamine  43     Nutrition Prescription:   2028 Kcals/day( 30 kcal/kg to promote weight maintenance),  68 g protein( 1.0 g/kg)     Support System:    Pt lives alone; states her daughter routinely brings food over to her, and her son checks on her regularly and usually accompanies her to doctor's appts.  Both son and daughter grocery shop for her.    Diet Hx:   Pt states she has Parkinson's Disease, and purpose of her visit today is to find out if there are any foods she can eat to help slow down the progression of the disease.  It was explained to her that while there are no specific foods, it is recommended to include a variety of fruits, vegetables and lean proteins in her daily regimen and most importantly to maintain weight and adequate calorie intake.  Pt states she usually only eats 2 meals/day, and never feels hungry.   Snacks:  Few nuts; fresh fruit    Breakfast:   First thing in the morning eats a banana and drinks water.    9:30-10 am:  Boiled or scrambled egg or peanut butter sandwich or cold cereal (Cheerios or Special K) with SILK soymilk.  Green tea  Lunch:   Around 2-2:30:  Grover on honey wheat bread with mustard, wilson, sometimes cheese and turkey or chicken.  Grapes or strawberries  Dinner:   Doesn't usually eat in the evening.  Last night daughter brought over beans and rice with turkey sausage cooked for Super Bowl party which pt had for supper, and has plenty of leftovers.    Current activity level and/or physical limitations:    Pt using a 4-prong cane to walk to office today; pt states she has a treadmill at home she uses for 20 minutes/day (or until she feels her blood pressure dropping), 3 days/week.  Goes to the library.  Pt states she has home-alert system if she needs it 2/2 low blood  "pressure    Motivation to make changes/anticipated barriers and/or expected adherence:    No changes anticipated to pt's current diet regimen; she was disappointed there is no "magic food" for Parkinson's    Nutrition-Focus Physical Findings:    Pt appears well nourished      Assessment:   Pt attentive and states she has followed current diet regimen and exercise regimen her entire life.  Pt states she has noticed recent unintentional weight loss but does not correlate it to poor po intake.  Pt was encouraged to include small snack in the evening containing source of protein.    Nutrition Diagnosis:   Food- and nutrition-related knowledge deficit RT lack of prior exposure to information AEB dx Parkinson's Disease      Recommendations:   Aim for 3 meals/day; include sources of anti-oxidants.    Strategies Implemented:   Increase daily caloric intake    Consultation Time:25 minutes - billed for 15 minutes.  Communicated with referring healthcare provider:  Consult note available in pt's Epic chart per MD discretion  Follow Up:  Pt provided with dietitian contact number and advised to call with questions or make future appointment if further intervention needed.                "

## 2020-02-03 NOTE — PROGRESS NOTES
Subjective:       Patient ID: Madeline Reilly is a 74 y.o. female.    Chief Complaint: Fecal Incontinence and Urinary Incontinence   Patient with a diagnosis of Parkinson's disease has been taken off of all blood pressure medication  Because of syncopal episodes when her blood pressure drops.  Patient notes new symptoms of fecal urinary incontinence last week  Patient denies any back pain or leg pain or weakness recently.  Denies any abdominal pain or fever  HPI see above  Review of Systems   Constitutional: Negative.    HENT: Negative.    Eyes: Negative.    Respiratory: Negative.    Cardiovascular: Negative.    Gastrointestinal: Positive for diarrhea.   Endocrine: Negative.    Genitourinary: Positive for enuresis.   Musculoskeletal: Negative.    Skin: Negative.    Allergic/Immunologic: Negative.    Neurological: Positive for tremors.   Hematological: Negative.    Psychiatric/Behavioral: Negative.        Objective:      Physical Exam   Constitutional: She is oriented to person, place, and time. She appears well-developed and well-nourished. No distress.   HENT:   Head: Normocephalic and atraumatic.   Right Ear: External ear normal.   Left Ear: External ear normal.   Nose: Nose normal.   Mouth/Throat: Oropharynx is clear and moist. No oropharyngeal exudate.   Eyes: Pupils are equal, round, and reactive to light. Conjunctivae and EOM are normal.   Neck: Normal range of motion. Neck supple. No JVD present. No tracheal deviation present. No thyromegaly present.   Cardiovascular: Normal rate, regular rhythm, normal heart sounds and intact distal pulses.   No murmur heard.  Pulmonary/Chest: Effort normal and breath sounds normal. No respiratory distress.   Abdominal: Soft. Bowel sounds are normal. She exhibits no distension and no mass. There is no tenderness.   Musculoskeletal: Normal range of motion.   Lymphadenopathy:     She has no cervical adenopathy.   Neurological: She is alert and oriented to person, place, and  time. She displays normal reflexes. No cranial nerve deficit or sensory deficit. She exhibits normal muscle tone. Coordination normal.   Skin: Skin is warm and dry. Capillary refill takes less than 2 seconds. No rash noted. She is not diaphoretic. No erythema. No pallor.   Psychiatric: She has a normal mood and affect. Her behavior is normal. Judgment and thought content normal.   Nursing note and vitals reviewed.      Assessment:       1. Parkinson's disease    2. Urinary incontinence, unspecified type    3. Neurological deficit present        Plan:     Madeline was seen today for fecal incontinence and urinary incontinence.    Diagnoses and all orders for this visit:    Parkinson's disease  -     Ambulatory consult to Physical Therapy    Urinary incontinence, unspecified type  -     POCT URINE DIPSTICK WITHOUT MICROSCOPE  -     Urinalysis, Reflex to Urine Culture Urine, Clean Catch; Future    Neurological deficit present  -     MRI Brain Without Contrast; Future    Other orders  -     Cancel: POCT URINE DIPSTICK WITHOUT MICROSCOPE         will contact the patient with results of MRI follow-up with Neurology scheduled    LABILE HYPERTENSION    Follow-up blood pressure check in 2-3 weeks

## 2020-02-04 DIAGNOSIS — N39.0 URINARY TRACT INFECTION WITH HEMATURIA, SITE UNSPECIFIED: Primary | ICD-10-CM

## 2020-02-04 DIAGNOSIS — R31.9 URINARY TRACT INFECTION WITH HEMATURIA, SITE UNSPECIFIED: Primary | ICD-10-CM

## 2020-02-04 RX ORDER — CEPHALEXIN 250 MG/1
250 CAPSULE ORAL EVERY 8 HOURS
Qty: 30 CAPSULE | Refills: 0 | Status: SHIPPED | OUTPATIENT
Start: 2020-02-04 | End: 2020-03-10

## 2020-02-11 ENCOUNTER — TELEPHONE (OUTPATIENT)
Dept: PRIMARY CARE CLINIC | Facility: CLINIC | Age: 75
End: 2020-02-11

## 2020-02-11 NOTE — TELEPHONE ENCOUNTER
Left VM advising pt and advised referral was faxed to [facility]     ----- Message from Kasey Mg sent at 2/11/2020  4:06 PM CST -----  Contact: Self   Pt states that referral for physical therapy was incorrect need to be made for [facility]. Please call and advise.

## 2020-02-13 ENCOUNTER — TELEPHONE (OUTPATIENT)
Dept: PRIMARY CARE CLINIC | Facility: CLINIC | Age: 75
End: 2020-02-13

## 2020-02-13 ENCOUNTER — CLINICAL SUPPORT (OUTPATIENT)
Dept: PRIMARY CARE CLINIC | Facility: CLINIC | Age: 75
End: 2020-02-13
Payer: MEDICARE

## 2020-02-13 VITALS — DIASTOLIC BLOOD PRESSURE: 86 MMHG | SYSTOLIC BLOOD PRESSURE: 134 MMHG

## 2020-02-13 NOTE — TELEPHONE ENCOUNTER
Pt was seen today for a nurse Bp. BP was 134/86. Pt advised she has not been taking her blood pressure medication.

## 2020-03-09 ENCOUNTER — NURSE TRIAGE (OUTPATIENT)
Dept: ADMINISTRATIVE | Facility: CLINIC | Age: 75
End: 2020-03-09

## 2020-03-09 ENCOUNTER — TELEPHONE (OUTPATIENT)
Dept: PRIMARY CARE CLINIC | Facility: CLINIC | Age: 75
End: 2020-03-09

## 2020-03-09 NOTE — TELEPHONE ENCOUNTER
Left VM advising pt I was returning her phone call.    ----- Message from Kamini Gloria sent at 3/9/2020 10:41 AM CDT -----  Contact: self   Pt states she is supposed to have a referral to physical medicine. Pt stated she was advised by [employer] [place] the referral was approved. Please call and advise.

## 2020-03-09 NOTE — TELEPHONE ENCOUNTER
"    Reason for Disposition   Brief dizziness or lightheadedness after standing up or eating   MODERATE weakness (i.e., interferes with work, school, normal activities) and cause unknown    Additional Information   Negative: Shock suspected (e.g., cold/pale/clammy skin, too weak to stand, low BP, rapid pulse)   Negative: Difficult to awaken or acting confused (e.g., disoriented, slurred speech)   Negative: Fainted, and still feels dizzy afterwards   Negative: Severe difficulty breathing (e.g., struggling for each breath, speaks in single words)   Negative: Overdose (accidental or intentional) of medications   Negative: New neurologic deficit that is present now: * Weakness of the face, arm, or leg on one side of the body * Numbness of the face, arm, or leg on one side of the body * Loss of speech or garbled speech   Negative: Systolic BP < 90 and feeling dizzy, lightheaded, or weak   Negative: Started suddenly after an allergic medicine, an allergic food, or bee sting   Negative: Shock suspected (e.g., cold/pale/clammy skin, too weak to stand, low BP, rapid pulse)   Negative: Difficult to awaken or acting confused  (e.g., disoriented, slurred speech)   Negative: Fainted   Negative: Chest pain   Negative: Bleeding (e.g., vomiting blood, rectal bleeding or tarry stools, severe vaginal bleeding)   Negative: Extra heart beats or heart is beating fast  (i.e., "palpitations")   Negative: Sounds like a life-threatening emergency to the triager   Negative: Systolic BP < 80 and NOT dizzy, lightheaded or weak (feels normal)   Negative: Abdominal pain   Negative: Major surgery in the past month   Negative: Fever > 100.5 F (38.1 C)   Negative: Drinking very little and has signs of dehydration (e.g., no urine > 12 hours, very dry mouth, very lightheaded)   Negative: Fall in systolic BP > 20 mm Hg from normal and feeling dizzy, lightheaded, or weak   Negative: Patient sounds very sick or weak to the " triager   Negative: Systolic BP < 90 and NOT dizzy, lightheaded or weak   Negative: Systolic BP  while taking blood pressure medications and NOT dizzy, lightheaded or weak   Negative: Fall in systolic BP > 20 mm Hg from normal and NOT dizzy, lightheaded, or weak   Negative: Fall in systolic BP > 20 mmHg after standing up   Negative: Fall in systolic BP > 20 mmHg after eating a meal   Negative: Diastolic BP < 50 mm Hg   Negative: Wants doctor to measure BP   Negative: Severe difficulty breathing (e.g., struggling for each breath, speaks in single words)   Negative: Shock suspected (e.g., cold/pale/clammy skin, too weak to stand, low BP, rapid pulse)   Negative: Difficult to awaken or acting confused (e.g., disoriented, slurred speech)   Negative: Fainted > 15 minutes ago and still feels too weak or dizzy to stand   Negative: SEVERE weakness (i.e., unable to walk or barely able to walk, requires support) and new onset or worsening   Negative: Sounds like a life-threatening emergency to the triager   Negative: Weakness of the face, arm or leg on one side of the body   Negative: Has diabetes and weakness from low blood sugar (i.e., < 60 mg/dl or 3.5 mmol/l)   Negative: Recent heat exposure, suspected cause of weakness   Negative: Vomiting is the main symptom   Negative: Diarrhea is the main symptom   Negative: Difficulty breathing   Negative: Heart beating < 50 beats per minute OR > 140 beats per minute   Negative: Extra heartbeats OR irregular heart beating (i.e., 'palpitations')   Negative: Follows bleeding (e.g., from vomiting, rectum, vagina)   Negative: Bloody, black, or tarry bowel movements   Negative: MODERATE weakness from poor fluid intake with no improvement after 2 hours of rest and fluids   Negative: Drinking very little and dehydration suspected (e.g., no urine > 12 hours, very dry mouth, very lightheaded)   Negative: Patient sounds very sick or weak to the  triager    Protocols used: LOW BLOOD PRESSURE-A-OH, WEAKNESS (GENERALIZED) AND FATIGUE-A-OH, DIZZINESS-A-OH    Pt is trying to reach PCP's office,stated over the weekend she felt very dizzy and started sweating. Stated she loss vision in her right eye during that episode. Pt denies dizziness now. Pt stated at 9 am today her BP was 79/49. During triage call BP is 105/64. Pt stated she has a new onset of weakness that interferes with her daily activities. Per triage protocol, advised to see a Physician within 2 hrs or go to Urgent Care. Pt verbalized understanding. High priority message sent to PCP's office to follow up.

## 2020-03-09 NOTE — TELEPHONE ENCOUNTER
Spoke with patient and she will discuss this with Dr. James's Ma when she comes in tomorrow for her appointment with INOCENCIO Carpenter.

## 2020-03-09 NOTE — TELEPHONE ENCOUNTER
Spoke with patient about her episodes of weakness and dizziness and medications that she is currently taking.  Patient states that she would have to drive herself to urgent care.  I told her absolutely not, I will schedule her to be seen tomorrow, just give me a time where she can secure a ride.  Appointment scheduled for early afternoon with INOCENCIO Carpenter.

## 2020-03-10 ENCOUNTER — OFFICE VISIT (OUTPATIENT)
Dept: PRIMARY CARE CLINIC | Facility: CLINIC | Age: 75
End: 2020-03-10
Payer: MEDICARE

## 2020-03-10 ENCOUNTER — TELEPHONE (OUTPATIENT)
Dept: PRIMARY CARE CLINIC | Facility: CLINIC | Age: 75
End: 2020-03-10

## 2020-03-10 VITALS
WEIGHT: 147.5 LBS | DIASTOLIC BLOOD PRESSURE: 98 MMHG | HEART RATE: 60 BPM | TEMPERATURE: 98 F | BODY MASS INDEX: 24.57 KG/M2 | HEIGHT: 65 IN | SYSTOLIC BLOOD PRESSURE: 174 MMHG

## 2020-03-10 DIAGNOSIS — R42 DIZZINESS: Primary | ICD-10-CM

## 2020-03-10 DIAGNOSIS — N39.0 URINARY TRACT INFECTION WITH HEMATURIA, SITE UNSPECIFIED: ICD-10-CM

## 2020-03-10 DIAGNOSIS — R31.9 URINARY TRACT INFECTION WITH HEMATURIA, SITE UNSPECIFIED: ICD-10-CM

## 2020-03-10 DIAGNOSIS — Z86.69: ICD-10-CM

## 2020-03-10 DIAGNOSIS — I95.1 ORTHOSTATIC HYPOTENSION: ICD-10-CM

## 2020-03-10 DIAGNOSIS — R09.89 LABILE BLOOD PRESSURE: ICD-10-CM

## 2020-03-10 LAB
BILIRUB SERPL-MCNC: ABNORMAL MG/DL
BLOOD URINE, POC: 250
COLOR, POC UA: YELLOW
GLUCOSE UR QL STRIP: NORMAL
KETONES UR QL STRIP: ABNORMAL
LEUKOCYTE ESTERASE URINE, POC: ABNORMAL
NITRITE, POC UA: ABNORMAL
PH, POC UA: 5
PROTEIN, POC: 30
SPECIFIC GRAVITY, POC UA: 1
UROBILINOGEN, POC UA: 1

## 2020-03-10 PROCEDURE — 3080F PR MOST RECENT DIASTOLIC BLOOD PRESSURE >= 90 MM HG: ICD-10-PCS | Mod: CPTII,S$GLB,, | Performed by: NURSE PRACTITIONER

## 2020-03-10 PROCEDURE — 1159F MED LIST DOCD IN RCRD: CPT | Mod: S$GLB,,, | Performed by: NURSE PRACTITIONER

## 2020-03-10 PROCEDURE — 1101F PR PT FALLS ASSESS DOC 0-1 FALLS W/OUT INJ PAST YR: ICD-10-PCS | Mod: CPTII,S$GLB,, | Performed by: NURSE PRACTITIONER

## 2020-03-10 PROCEDURE — 99214 PR OFFICE/OUTPT VISIT, EST, LEVL IV, 30-39 MIN: ICD-10-PCS | Mod: 25,S$GLB,, | Performed by: NURSE PRACTITIONER

## 2020-03-10 PROCEDURE — 1101F PT FALLS ASSESS-DOCD LE1/YR: CPT | Mod: CPTII,S$GLB,, | Performed by: NURSE PRACTITIONER

## 2020-03-10 PROCEDURE — 99999 PR PBB SHADOW E&M-EST. PATIENT-LVL IV: CPT | Mod: PBBFAC,,, | Performed by: NURSE PRACTITIONER

## 2020-03-10 PROCEDURE — 99214 OFFICE O/P EST MOD 30 MIN: CPT | Mod: 25,S$GLB,, | Performed by: NURSE PRACTITIONER

## 2020-03-10 PROCEDURE — 3077F SYST BP >= 140 MM HG: CPT | Mod: CPTII,S$GLB,, | Performed by: NURSE PRACTITIONER

## 2020-03-10 PROCEDURE — 99999 PR PBB SHADOW E&M-EST. PATIENT-LVL IV: ICD-10-PCS | Mod: PBBFAC,,, | Performed by: NURSE PRACTITIONER

## 2020-03-10 PROCEDURE — 87086 URINE CULTURE/COLONY COUNT: CPT

## 2020-03-10 PROCEDURE — 3080F DIAST BP >= 90 MM HG: CPT | Mod: CPTII,S$GLB,, | Performed by: NURSE PRACTITIONER

## 2020-03-10 PROCEDURE — 1159F PR MEDICATION LIST DOCUMENTED IN MEDICAL RECORD: ICD-10-PCS | Mod: S$GLB,,, | Performed by: NURSE PRACTITIONER

## 2020-03-10 PROCEDURE — 81002 POCT URINE DIPSTICK WITHOUT MICROSCOPE: ICD-10-PCS | Mod: S$GLB,,, | Performed by: NURSE PRACTITIONER

## 2020-03-10 PROCEDURE — 3077F PR MOST RECENT SYSTOLIC BLOOD PRESSURE >= 140 MM HG: ICD-10-PCS | Mod: CPTII,S$GLB,, | Performed by: NURSE PRACTITIONER

## 2020-03-10 PROCEDURE — 81002 URINALYSIS NONAUTO W/O SCOPE: CPT | Mod: S$GLB,,, | Performed by: NURSE PRACTITIONER

## 2020-03-10 RX ORDER — NITROFURANTOIN 25; 75 MG/1; MG/1
100 CAPSULE ORAL 2 TIMES DAILY
Qty: 14 CAPSULE | Refills: 0 | Status: SHIPPED | OUTPATIENT
Start: 2020-03-10 | End: 2020-11-20

## 2020-03-10 NOTE — Clinical Note
I saw Ms. Reilly in clinic yesterday. I suspect she had 2 TIAs over the weekend. She is getting the MRI Dr. James ordered on 1/31 completed on Friday.  She has follow up with neurology on 3/19 and with cardiology on 3/31. I wanted to make sure you were all aware. ThanksAlesia

## 2020-03-10 NOTE — TELEPHONE ENCOUNTER
Spoke with patient, do you have any suggestions for foods to add to her diet to prevent a stroke?    She said the response can be via portal.

## 2020-03-10 NOTE — TELEPHONE ENCOUNTER
----- Message from Kasey Mg sent at 3/10/2020  4:37 PM CDT -----  Contact: Self   Pt is requesting a call regarding questions. Please call and advise

## 2020-03-11 ENCOUNTER — PATIENT MESSAGE (OUTPATIENT)
Dept: CARDIOLOGY | Facility: CLINIC | Age: 75
End: 2020-03-11

## 2020-03-11 ENCOUNTER — PATIENT MESSAGE (OUTPATIENT)
Dept: NEUROLOGY | Facility: CLINIC | Age: 75
End: 2020-03-11

## 2020-03-11 ENCOUNTER — PATIENT MESSAGE (OUTPATIENT)
Dept: PRIMARY CARE CLINIC | Facility: CLINIC | Age: 75
End: 2020-03-11

## 2020-03-11 NOTE — PROGRESS NOTES
Subjective:       Patient ID: Madeline Reilly is a 74 y.o. female.    Chief Complaint: Dizziness and Fatigue    Ms. Reilly presents with her daughter today for assessment of weakness, dizziness, and temporary loss of vision in her right eye. She experienced these symptoms on Friday 3/6.  The loss of vision resolved after several minutes. This occurred again on Saturday 3/7 for several minutes and resolved. At the time of onset on Friday she reports a home BP reading of 79/49. She has known labile BP which has been challenging to manage. PMH is also significant for parkinson's diease, memory changes, and asthma.     She saw her PCP on 1/31/20 and was prescribe keflex for a UTI. She is still taking this medication because she felt that 3 times daily dosing was too much and so she has been taking 1 tablet daily most days of the week. She denies dysuria, urgency, frequency and flank pain.     She lives alone, has life alert.     Review of Systems   Constitutional: Negative for fever.   HENT: Negative for facial swelling.    Eyes: Positive for visual disturbance.   Respiratory: Negative for chest tightness and shortness of breath.    Cardiovascular: Negative for chest pain.   Gastrointestinal: Negative for diarrhea, nausea and vomiting.   Genitourinary: Negative for dysuria, flank pain, frequency and urgency.   Musculoskeletal: Negative for arthralgias and myalgias.   Skin: Negative for rash.   Neurological: Positive for dizziness and weakness. Negative for tremors, seizures, syncope, facial asymmetry and numbness.       Objective:      Physical Exam   Constitutional: She is oriented to person, place, and time. She appears well-developed and well-nourished. No distress.   HENT:   Head: Normocephalic and atraumatic.   Eyes: No scleral icterus.   Neck: Normal range of motion. Neck supple.   Cardiovascular: Normal rate, regular rhythm and normal heart sounds.   Pulmonary/Chest: Effort normal and breath sounds normal. No  stridor. No respiratory distress. She has no wheezes. She has no rales.   Abdominal: Soft. Bowel sounds are normal. She exhibits no distension and no mass. There is no tenderness. There is no rebound and no guarding.   Neurological: She is alert and oriented to person, place, and time. No cranial nerve deficit. She displays a negative Romberg sign.   Normal finger to nose test, normal rapid alternating movement, normal romberg   Skin: Skin is warm and dry. She is not diaphoretic.   Psychiatric: She has a normal mood and affect. Her behavior is normal.   Nursing note and vitals reviewed.      Assessment:       1. Dizziness    2. Labile blood pressure    3. Orthostatic hypotension    4. Urinary tract infection with hematuria, site unspecified    5. Resolved focal neurological deficit        Plan:   1. Dizziness  - POCT urine dipstick without microscope    2. Labile blood pressure  - Reviewed management of hypertension in context of severe orthostatic hypotension   - Amlodipine 5mg for BP >180/>100.     3. Orthostatic hypotension    4. Urinary tract infection with hematuria, site unspecified  - Stop keflex.  - Urine culture  - nitrofurantoin, macrocrystal-monohydrate, (MACROBID) 100 MG capsule; Take 1 capsule (100 mg total) by mouth 2 (two) times daily.  Dispense: 14 capsule; Refill: 0    5. Resolved focal neurological deficit  - Discussed concern for TIAs x 2 this past weekend. Advised patient on what a focal neurological deficit is and that she should go to hospital via EMS if this occurs again. She verbalized understanding, as did her daughter.   - Instructed to get MRI that was ordered by PCP 3 weeks ago completed.       Pt has been given instructions populated from [employer] database and has verbalized understanding of the after visit summary and information contained wherein.    Follow up with a primary care provider. May go to ER for acute shortness of breath, lightheadedness, fever, or any other emergent  complaints or changes in condition.

## 2020-03-12 ENCOUNTER — PATIENT MESSAGE (OUTPATIENT)
Dept: PRIMARY CARE CLINIC | Facility: CLINIC | Age: 75
End: 2020-03-12

## 2020-03-12 LAB
BACTERIA UR CULT: NORMAL
BACTERIA UR CULT: NORMAL

## 2020-03-16 ENCOUNTER — PATIENT MESSAGE (OUTPATIENT)
Dept: CARDIOLOGY | Facility: CLINIC | Age: 75
End: 2020-03-16

## 2020-03-16 ENCOUNTER — TELEPHONE (OUTPATIENT)
Dept: CARDIOLOGY | Facility: CLINIC | Age: 75
End: 2020-03-16

## 2020-03-16 NOTE — TELEPHONE ENCOUNTER
Pt states he son must have sent a message via [facility] to request a date. Asked the patient if she would like to schedule an appt to see Dr. Robertson at an early date. She states someone would have to bring her. Will send a message via [facility].

## 2020-03-16 NOTE — TELEPHONE ENCOUNTER
Pt states she is unable to make an earlier appt. The patient states her son brings her other appt and she would have to keep the appt for 3/31/2020.

## 2020-03-18 ENCOUNTER — TELEPHONE (OUTPATIENT)
Dept: NEUROLOGY | Facility: CLINIC | Age: 75
End: 2020-03-18

## 2020-03-18 NOTE — TELEPHONE ENCOUNTER
"Spoke with ms. Tommie, she was informed appts are being switched to virtual visits or telephone call. Per Ms. Reilly "okay. I understand, I will do a telephone call, I am not computer catrachito". Ms. Tommie advised Dr. Vasquez will all her at the time of her appt. Ms. Tommie says thank you   "

## 2020-03-18 NOTE — TELEPHONE ENCOUNTER
"Spoke with Ms. Reilly, she was informed the appts are now being switched to virtual visits. Ms. Reilly states "I will try to do it". Ms. Reilly was given the steps needed to begin the appt. Ms. Reilly says thank you   "

## 2020-03-18 NOTE — TELEPHONE ENCOUNTER
----- Message from Bandar Schaffer sent at 3/18/2020  2:53 PM CDT -----  Contact: pt @ [phone]  Pt asking to speak w/ the nurse, would like to change video visit to clinic appt

## 2020-03-19 ENCOUNTER — TELEPHONE (OUTPATIENT)
Dept: NEUROLOGY | Facility: CLINIC | Age: 75
End: 2020-03-19

## 2020-03-19 NOTE — TELEPHONE ENCOUNTER
Telephone followup visit  She continues to have weak feelings in legs with dizziness on standing, blurred vision with dizziness    She stopped the norvasc  Stopped clonidine    Taking sitting and standing Bps at home.  Sitting 95/60, 124/80  Lowest ever standing 80/46 , 80/49 - feels dizzy at these times (these numbers are common)  Highest Bp ever sitting 177/102 (rare - one-time)  Average Bps sitting 90/60    Continues to take carbidopa/levodopa 25/100mg - Cut down to 1 tab a day and getting less dizzy after this change  Tried abdominal binder when she gets up but this is insufficient  Has not had a m,ajor increase to PD symptoms since cutting down carbidopa/levodopa    Has MRI disc due to concern for MSA- daughter to mail it in      A/P  Parkinsonism with orthostasis    Recommendations  #Orthostaiss - Stop carbidopa/levodopa 25/100mg 1 tab PO TID  Salt food  Increase water intake   Ice water for dizzy spells  Eat smaller meals throughout the day  She will discuss midodrine and or florinef with Cardiology- I Epic messaged them to design a joint plan    #PDism - Concern for MSA due to severe orthostasis, memory change  Better corroborated with MRI changes- awaiting to see MRI disc    Jana Vasquez MD, MS [facility]  Department of Neurology  Movement Disorders

## 2020-03-19 NOTE — TELEPHONE ENCOUNTER
"Spoke with Jenn, she states "she is suppose to bring a Disc for Dr. patricio to review, she is not sure if she will get through". Jenn informed she can mail the disc to the clinic, Dr. Patricio will review and discuss with pt. Jenn states "I will have to pay". I informed her I am not sure of price. She states "she should not have to pay to mail the disc, if she would have known earlier she would have the imaging facility mail the disc, she will need to think about this". Jenn advised to contact the clinic when she decides what she is going to do. Jenn voiced understanding  "

## 2020-03-20 ENCOUNTER — TELEPHONE (OUTPATIENT)
Dept: CARDIOLOGY | Facility: CLINIC | Age: 75
End: 2020-03-20

## 2020-03-23 ENCOUNTER — OFFICE VISIT (OUTPATIENT)
Dept: CARDIOLOGY | Facility: CLINIC | Age: 75
End: 2020-03-23
Payer: MEDICARE

## 2020-03-23 DIAGNOSIS — R09.89 LABILE HYPERTENSION: ICD-10-CM

## 2020-03-23 DIAGNOSIS — G20.A1 PARKINSON'S DISEASE: ICD-10-CM

## 2020-03-23 DIAGNOSIS — I10 ESSENTIAL HYPERTENSION: ICD-10-CM

## 2020-03-23 DIAGNOSIS — I95.1 ORTHOSTATIC HYPOTENSION: Primary | ICD-10-CM

## 2020-03-23 DIAGNOSIS — R29.6 FALLS FREQUENTLY: ICD-10-CM

## 2020-03-23 DIAGNOSIS — R55 VASOVAGAL SYNCOPE: ICD-10-CM

## 2020-03-23 PROCEDURE — 99214 OFFICE O/P EST MOD 30 MIN: CPT | Mod: S$GLB,,, | Performed by: INTERNAL MEDICINE

## 2020-03-23 PROCEDURE — 1159F PR MEDICATION LIST DOCUMENTED IN MEDICAL RECORD: ICD-10-PCS | Mod: S$GLB,,, | Performed by: INTERNAL MEDICINE

## 2020-03-23 PROCEDURE — 99214 PR OFFICE/OUTPT VISIT, EST, LEVL IV, 30-39 MIN: ICD-10-PCS | Mod: S$GLB,,, | Performed by: INTERNAL MEDICINE

## 2020-03-23 PROCEDURE — 1159F MED LIST DOCD IN RCRD: CPT | Mod: S$GLB,,, | Performed by: INTERNAL MEDICINE

## 2020-03-23 PROCEDURE — 1101F PT FALLS ASSESS-DOCD LE1/YR: CPT | Mod: CPTII,S$GLB,, | Performed by: INTERNAL MEDICINE

## 2020-03-23 PROCEDURE — 1101F PR PT FALLS ASSESS DOC 0-1 FALLS W/OUT INJ PAST YR: ICD-10-PCS | Mod: CPTII,S$GLB,, | Performed by: INTERNAL MEDICINE

## 2020-03-23 RX ORDER — MIDODRINE HYDROCHLORIDE 2.5 MG/1
2.5 TABLET ORAL 2 TIMES DAILY WITH MEALS
Qty: 20 TABLET | Refills: 3 | Status: SHIPPED | OUTPATIENT
Start: 2020-03-23 | End: 2020-03-23

## 2020-03-23 RX ORDER — MIDODRINE HYDROCHLORIDE 2.5 MG/1
TABLET ORAL
Qty: 20 TABLET | Refills: 3 | Status: SHIPPED | OUTPATIENT
Start: 2020-03-23 | End: 2022-04-28

## 2020-03-23 NOTE — PROGRESS NOTES
Subjective:   Patient ID:  Madeline Reilly is a 74 y.o. female who presents for follow-up of No chief complaint on file.   Madeline Reilly is a 73 y.o. female who presents for follow-up of Orthostatic hypotension (6 week f/u )  Madeline Reilly is a 73 y.o. female is a new patient who presents for evaluation of Orthostatic hypotension and Dizziness     HPI:   Per report 12 years of orthostasis. Currently patient records Bps at home and has rare spikes to systolic 190's. Managed by OSH physician. She seems to suffer from dizziness on standing near constantly. I suggested an abdominal binder and consult to cariology for help with management. I also instructed her that carbidopa/levodopa can have blood pressure lowering effects. Suggested raising HOB, eating smaller meals, and using ice water to help with orthostatic moments.  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     Patient does not exercise  No chest pain, Orthopnea, PND of heart failure symptoms.   Denies palpitations or fluttering in the chest  No f/h     HPI:   She has labile HTN related to parkinson's  Reviewed BP diary . Much better BP at home than in the clinic. She does get occasional episodes of hypotension though  No chest pain, Orthopnea, PND of heart failure symptoms.   Denies palpitations or fluttering in the chest.     Echo:  · Normal left ventricular systolic function. The estimated ejection fraction is 60%  · Grade II (moderate) left ventricular diastolic dysfunction consistent with pseudonormalization. Elevated left atrial pressure.  · No wall motion abnormalities.  · Concentric left ventricular remodeling.  · Normal right ventricular systolic function.  · The estimated PA systolic pressure is 27 mm Hg  · Normal central venous pressure (3 mm Hg).  · Small circumferential pericardial effusion. No tamponade physiology.    HPI:  Follow up on hypotension that the  patient was experiencing. She was getting dizzy and BP was dropping to 90s. She has stopped taking norvasc and she appears non-compliant with compression stalkings.   However for the past week she has not had episodes of hypotension and her BP has better.      The patient location is: [place]  The chief complaint leading to consultation is: dizziness, hypotension  Visit type: Virtual visit with synchronous audio and video- PATIENT COULD NOT DO VIDEO VISIT SO THIS WAS AUDIO VISIT ONLY  Total time spent with patient: 20 min  Each patient to whom he or she provides medical services by telemedicine is:  (1) informed of the relationship between the physician and patient and the respective role of any other health care provider with respect to management of the patient; and (2) notified that he or she may decline to receive medical services by telemedicine and may withdraw from such care at any time.    Notes: please see above      Patient Active Problem List   Diagnosis    Asthma, chronic    Orthostatic hypotension    Hypertension    HLD (hyperlipidemia)    Micturition syncope    Vasovagal syncope    Parkinsonism    Memory change     There were no vitals taken for this visit.  There is no height or weight on file to calculate BMI.  CrCl cannot be calculated (Patient's most recent lab result is older than the maximum 7 days allowed.).    Lab Results   Component Value Date     04/30/2019    K 4.2 04/30/2019     04/30/2019    CO2 23 04/30/2019    BUN 22 04/30/2019    BUN 15 09/29/2015    CREATININE 1.0 04/30/2019    GLU 82 04/30/2019    HGBA1C 5.5 04/30/2019    AST 16 04/30/2019    ALT <5 (L) 04/30/2019    ALBUMIN 4.0 04/30/2019    PROT 7.5 04/30/2019    BILITOT 0.4 04/30/2019    WBC 5.55 04/30/2019    HGB 12.4 04/30/2019    HCT 38.0 04/30/2019    MCV 92 04/30/2019     04/30/2019    TSH 1.351 04/30/2019    CHOL 205 (H) 04/30/2019    HDL 67 04/30/2019    LDLCALC 123.6 04/30/2019    LDLCALC 143 (H)  09/29/2015    TRIG 72 04/30/2019       Current Outpatient Medications   Medication Sig    ADVAIR DISKUS 500-50 mcg/dose DsDv diskus inhaler USE 1 INHALATION PO BID    ALBUTEROL SULFATE (VENTOLIN INHL) Inhale into the lungs every 4 (four) hours as needed.    amLODIPine (NORVASC) 5 MG tablet Take one tablet every morning. Take an additional tablet every afternoon if BP is >140/>90    atorvastatin (LIPITOR) 20 MG tablet TAKE 1 TABLET(20 MG) BY MOUTH EVERY DAY    azelastine (ASTELIN) 137 mcg (0.1 %) nasal spray SPRAY TWICE INTO EACH NOSTRIL ONCE  Q DAY    calcium-vitamin D 500-125 mg-unit tablet Take 1 tablet by mouth once daily.     carbidopa-levodopa  mg (SINEMET)  mg per tablet Take 1 tablet by mouth 3 (three) times daily.    cholecalciferol, vitamin D3, (VITAMIN D3) 25 mcg (1,000 unit) capsule Take 1,000 Units by mouth.    donepezil (ARICEPT) 5 MG tablet Take 1 tablet (5 mg total) by mouth every evening.    famotidine-calcium carbonate-magnesium hydroxide (PEPCID COMPLETE) chewable tablet Take 1 tablet by mouth.    famotidine-calcium carbonate-magnesium hydroxide (PEPCID COMPLETE) chewable tablet Take 1 tablet by mouth daily as needed.    fexofenadine (ALLEGRA) 60 MG tablet Take 60 mg by mouth once as needed.     ipratropium (ATROVENT) 0.06 % nasal spray SPRAY TWICE IN EACH NOSTRIL BID    loperamide (IMODIUM) 2 mg capsule TK ONE C PO  QID PRN    midodrine (PROAMATINE) 2.5 MG Tab Take 1 tablet (2.5 mg total) by mouth 2 (two) times daily with meals.    nitrofurantoin, macrocrystal-monohydrate, (MACROBID) 100 MG capsule Take 1 capsule (100 mg total) by mouth 2 (two) times daily.    omega-3s-dha-epa-fish oil 200 mg-300 mg- 100 mg-1,000 mg Cap Take by mouth.     No current facility-administered medications for this visit.        Review of Systems   Constitution: Negative for chills, decreased appetite, malaise/fatigue, night sweats, weight gain and weight loss.   Eyes: Negative for blurred  vision, double vision, visual disturbance and visual halos.   Cardiovascular: Negative for chest pain, claudication, cyanosis, dyspnea on exertion, irregular heartbeat, leg swelling, near-syncope, orthopnea, palpitations, paroxysmal nocturnal dyspnea and syncope.   Respiratory: Negative for cough, hemoptysis, snoring, sputum production and wheezing.    Endocrine: Negative for cold intolerance, heat intolerance, polydipsia and polyphagia.   Hematologic/Lymphatic: Negative for adenopathy and bleeding problem. Does not bruise/bleed easily.   Skin: Negative for flushing, itching, poor wound healing and rash.   Musculoskeletal: Negative for arthritis, back pain, falls, gout, joint pain, joint swelling, muscle cramps, muscle weakness, myalgias, neck pain and stiffness.   Gastrointestinal: Negative for bloating, abdominal pain, anorexia, diarrhea, dysphagia, excessive appetite, flatus, hematemesis, jaundice, melena and nausea.   Genitourinary: Negative for hesitancy and incomplete emptying.   Neurological: Negative for aphonia, brief paralysis, difficulty with concentration, disturbances in coordination, excessive daytime sleepiness, dizziness, focal weakness, light-headedness, loss of balance and weakness.   Psychiatric/Behavioral: Negative for altered mental status, depression, hallucinations, hypervigilance, memory loss, substance abuse and suicidal ideas. The patient does not have insomnia and is not nervous/anxious.        Objective:   Physical Exam   Constitutional:   Telephone call visit       Assessment:     1. Orthostatic hypotension    2. Parkinson's disease    3. Vasovagal syncope    4. Labile hypertension    5. Essential hypertension    6. Falls frequently        Plan:   We discussed the severe hypotension and labile HTN are due to autonomic dysfunction related to parkinson's disease. Can try midodrine in severe sx, and this may help. I have instructed her to try midodrine BID only in severe cases if BP is less  then 90 mmHg and she is dizzy.    Diagnoses and all orders for this visit:    Orthostatic hypotension    Parkinson's disease    Vasovagal syncope    Labile hypertension    Essential hypertension    Falls frequently    Other orders  -     midodrine (PROAMATINE) 2.5 MG Tab; Take 1 tablet (2.5 mg total) by mouth 2 (two) times daily with meals.      RTC 10 wks.

## 2020-03-27 ENCOUNTER — TELEPHONE (OUTPATIENT)
Dept: NEUROLOGY | Facility: CLINIC | Age: 75
End: 2020-03-27

## 2020-03-27 NOTE — TELEPHONE ENCOUNTER
----- Message from Evie Zheng sent at 3/27/2020  8:31 AM CDT -----  Contact: self @ [phone]  Pt says she woke up with tremors and is not sure what to do.  Pls call asap.

## 2020-03-31 ENCOUNTER — TELEPHONE (OUTPATIENT)
Dept: NEUROLOGY | Facility: CLINIC | Age: 75
End: 2020-03-31

## 2020-03-31 NOTE — TELEPHONE ENCOUNTER
"Spoke with Ms. Reilly, she states "starting Thursday she has been experiencing painful tremors in her right arm, making it hard for her to sleep, pt confirm no other symptom". Ms. Reilly informed a message will be forward to  for review. She voiced understanding   "

## 2020-03-31 NOTE — TELEPHONE ENCOUNTER
----- Message from Sharad Parmar sent at 3/31/2020 10:07 AM CDT -----  Contact: Pt.[phone]  The patient would like to speak to someone regarding some discomfort.  Please contact the patient to discuss further.

## 2020-07-06 ENCOUNTER — PATIENT OUTREACH (OUTPATIENT)
Dept: ADMINISTRATIVE | Facility: OTHER | Age: 75
End: 2020-07-06

## 2020-07-06 NOTE — PROGRESS NOTES
Requested updates within Care Everywhere.  Patient's chart was reviewed for overdue RIMA topics.  Immunizations reconciled.    Orders placed:n/a  Tasked appts:n/a  Labs Linked:n/a

## 2020-07-30 ENCOUNTER — PATIENT OUTREACH (OUTPATIENT)
Dept: ADMINISTRATIVE | Facility: HOSPITAL | Age: 75
End: 2020-07-30

## 2020-07-30 DIAGNOSIS — Z11.59 NEED FOR HEPATITIS C SCREENING TEST: Primary | ICD-10-CM

## 2020-07-30 NOTE — PROGRESS NOTES
Pre-visit chart review completed.  Immunizations reviewed and updated.    Patient due for the following    Hepatitis C Screening     TETANUS VACCINE     Shingles Vaccine (1 of 2)        Hep c ordered.

## 2020-08-13 ENCOUNTER — TELEPHONE (OUTPATIENT)
Dept: PRIMARY CARE CLINIC | Facility: CLINIC | Age: 75
End: 2020-08-13

## 2020-11-16 ENCOUNTER — OFFICE VISIT (OUTPATIENT)
Dept: PRIMARY CARE CLINIC | Facility: CLINIC | Age: 75
End: 2020-11-16
Payer: MEDICARE

## 2020-11-16 ENCOUNTER — IMMUNIZATION (OUTPATIENT)
Dept: PHARMACY | Facility: CLINIC | Age: 75
End: 2020-11-16

## 2020-11-16 ENCOUNTER — LAB VISIT (OUTPATIENT)
Dept: LAB | Facility: HOSPITAL | Age: 75
End: 2020-11-16
Attending: FAMILY MEDICINE
Payer: MEDICARE

## 2020-11-16 VITALS
OXYGEN SATURATION: 100 % | HEART RATE: 64 BPM | DIASTOLIC BLOOD PRESSURE: 114 MMHG | TEMPERATURE: 98 F | HEIGHT: 65 IN | WEIGHT: 125.44 LBS | BODY MASS INDEX: 20.9 KG/M2 | SYSTOLIC BLOOD PRESSURE: 230 MMHG

## 2020-11-16 DIAGNOSIS — J45.21 ASTHMA, CHRONIC, MILD INTERMITTENT, WITH ACUTE EXACERBATION: ICD-10-CM

## 2020-11-16 DIAGNOSIS — R41.3 MEMORY CHANGE: ICD-10-CM

## 2020-11-16 DIAGNOSIS — I95.1 ORTHOSTATIC HYPOTENSION: ICD-10-CM

## 2020-11-16 DIAGNOSIS — Z13.220 ENCOUNTER FOR LIPID SCREENING FOR CARDIOVASCULAR DISEASE: ICD-10-CM

## 2020-11-16 DIAGNOSIS — Z13.6 ENCOUNTER FOR LIPID SCREENING FOR CARDIOVASCULAR DISEASE: ICD-10-CM

## 2020-11-16 DIAGNOSIS — R63.4 UNINTENTIONAL WEIGHT LOSS: ICD-10-CM

## 2020-11-16 DIAGNOSIS — Z11.59 NEED FOR HEPATITIS C SCREENING TEST: ICD-10-CM

## 2020-11-16 DIAGNOSIS — I10 HYPERTENSION, UNSPECIFIED TYPE: ICD-10-CM

## 2020-11-16 DIAGNOSIS — E78.5 HLD (HYPERLIPIDEMIA): ICD-10-CM

## 2020-11-16 DIAGNOSIS — G20.C PARKINSONISM, UNSPECIFIED PARKINSONISM TYPE: ICD-10-CM

## 2020-11-16 DIAGNOSIS — I10 ESSENTIAL HYPERTENSION: ICD-10-CM

## 2020-11-16 DIAGNOSIS — Z00.00 LABORATORY EXAM ORDERED AS PART OF ROUTINE GENERAL MEDICAL EXAMINATION: ICD-10-CM

## 2020-11-16 DIAGNOSIS — J45.40 MODERATE PERSISTENT CHRONIC ASTHMA WITHOUT COMPLICATION: ICD-10-CM

## 2020-11-16 DIAGNOSIS — Z00.00 ANNUAL PHYSICAL EXAM: Primary | ICD-10-CM

## 2020-11-16 DIAGNOSIS — E78.2 MIXED HYPERLIPIDEMIA: ICD-10-CM

## 2020-11-16 LAB
BASOPHILS # BLD AUTO: 0.04 K/UL (ref 0–0.2)
BASOPHILS NFR BLD: 0.8 % (ref 0–1.9)
DIFFERENTIAL METHOD: ABNORMAL
EOSINOPHIL # BLD AUTO: 0 K/UL (ref 0–0.5)
EOSINOPHIL NFR BLD: 0.4 % (ref 0–8)
ERYTHROCYTE [DISTWIDTH] IN BLOOD BY AUTOMATED COUNT: 14 % (ref 11.5–14.5)
HCT VFR BLD AUTO: 38.7 % (ref 37–48.5)
HGB BLD-MCNC: 12.1 G/DL (ref 12–16)
IMM GRANULOCYTES # BLD AUTO: 0 K/UL (ref 0–0.04)
IMM GRANULOCYTES NFR BLD AUTO: 0 % (ref 0–0.5)
LYMPHOCYTES # BLD AUTO: 1.9 K/UL (ref 1–4.8)
LYMPHOCYTES NFR BLD: 37.4 % (ref 18–48)
MCH RBC QN AUTO: 29.7 PG (ref 27–31)
MCHC RBC AUTO-ENTMCNC: 31.3 G/DL (ref 32–36)
MCV RBC AUTO: 95 FL (ref 82–98)
MONOCYTES # BLD AUTO: 0.5 K/UL (ref 0.3–1)
MONOCYTES NFR BLD: 9.1 % (ref 4–15)
NEUTROPHILS # BLD AUTO: 2.7 K/UL (ref 1.8–7.7)
NEUTROPHILS NFR BLD: 52.3 % (ref 38–73)
NRBC BLD-RTO: 0 /100 WBC
PLATELET # BLD AUTO: 217 K/UL (ref 150–350)
PMV BLD AUTO: 10.9 FL (ref 9.2–12.9)
RBC # BLD AUTO: 4.08 M/UL (ref 4–5.4)
WBC # BLD AUTO: 5.06 K/UL (ref 3.9–12.7)

## 2020-11-16 PROCEDURE — 85025 COMPLETE CBC W/AUTO DIFF WBC: CPT

## 2020-11-16 PROCEDURE — 3080F DIAST BP >= 90 MM HG: CPT | Mod: CPTII,S$GLB,, | Performed by: FAMILY MEDICINE

## 2020-11-16 PROCEDURE — 99999 PR PBB SHADOW E&M-EST. PATIENT-LVL V: ICD-10-PCS | Mod: PBBFAC,,, | Performed by: FAMILY MEDICINE

## 2020-11-16 PROCEDURE — 80053 COMPREHEN METABOLIC PANEL: CPT

## 2020-11-16 PROCEDURE — 99999 PR PBB SHADOW E&M-EST. PATIENT-LVL V: CPT | Mod: PBBFAC,,, | Performed by: FAMILY MEDICINE

## 2020-11-16 PROCEDURE — 36415 COLL VENOUS BLD VENIPUNCTURE: CPT | Mod: PN

## 2020-11-16 PROCEDURE — 3077F PR MOST RECENT SYSTOLIC BLOOD PRESSURE >= 140 MM HG: ICD-10-PCS | Mod: CPTII,S$GLB,, | Performed by: FAMILY MEDICINE

## 2020-11-16 PROCEDURE — 1101F PR PT FALLS ASSESS DOC 0-1 FALLS W/OUT INJ PAST YR: ICD-10-PCS | Mod: CPTII,S$GLB,, | Performed by: FAMILY MEDICINE

## 2020-11-16 PROCEDURE — 3080F PR MOST RECENT DIASTOLIC BLOOD PRESSURE >= 90 MM HG: ICD-10-PCS | Mod: CPTII,S$GLB,, | Performed by: FAMILY MEDICINE

## 2020-11-16 PROCEDURE — 3288F FALL RISK ASSESSMENT DOCD: CPT | Mod: CPTII,S$GLB,, | Performed by: FAMILY MEDICINE

## 2020-11-16 PROCEDURE — 3288F PR FALLS RISK ASSESSMENT DOCUMENTED: ICD-10-PCS | Mod: CPTII,S$GLB,, | Performed by: FAMILY MEDICINE

## 2020-11-16 PROCEDURE — 99214 PR OFFICE/OUTPT VISIT, EST, LEVL IV, 30-39 MIN: ICD-10-PCS | Mod: S$GLB,,, | Performed by: FAMILY MEDICINE

## 2020-11-16 PROCEDURE — 3077F SYST BP >= 140 MM HG: CPT | Mod: CPTII,S$GLB,, | Performed by: FAMILY MEDICINE

## 2020-11-16 PROCEDURE — 99499 RISK ADDL DX/OHS AUDIT: ICD-10-PCS | Mod: S$GLB,,, | Performed by: FAMILY MEDICINE

## 2020-11-16 PROCEDURE — 84443 ASSAY THYROID STIM HORMONE: CPT

## 2020-11-16 PROCEDURE — 86803 HEPATITIS C AB TEST: CPT

## 2020-11-16 PROCEDURE — 99214 OFFICE O/P EST MOD 30 MIN: CPT | Mod: S$GLB,,, | Performed by: FAMILY MEDICINE

## 2020-11-16 PROCEDURE — 1126F PR PAIN SEVERITY QUANTIFIED, NO PAIN PRESENT: ICD-10-PCS | Mod: S$GLB,,, | Performed by: FAMILY MEDICINE

## 2020-11-16 PROCEDURE — 1101F PT FALLS ASSESS-DOCD LE1/YR: CPT | Mod: CPTII,S$GLB,, | Performed by: FAMILY MEDICINE

## 2020-11-16 PROCEDURE — 1126F AMNT PAIN NOTED NONE PRSNT: CPT | Mod: S$GLB,,, | Performed by: FAMILY MEDICINE

## 2020-11-16 PROCEDURE — 99499 UNLISTED E&M SERVICE: CPT | Mod: S$GLB,,, | Performed by: FAMILY MEDICINE

## 2020-11-16 PROCEDURE — 80061 LIPID PANEL: CPT

## 2020-11-16 NOTE — PROGRESS NOTES
"Subjective:       Patient ID: Madeline Reilly is a 74 y.o. female.    Chief Complaint: Annual Exam      75 yo female states that she is here for her annual physical exam. She is accompanied by her daughter.    This is her first visit with me. See follows with Dr. Espinosa.    She has complains  - Chest tightness more frequent  She is uncertain of the duration. She says she has memory issues which are "not that serious".  She is unable to give further details and is not interested in basic work up; stating that she has been worked up in the past.    - Memory concerns  Feels like her memory concerns are part of the acing process. She is not taking aricept.      - Abdominal pain  Has GI doctor external to [facility] with whom she is satisfied and is not interested in a second opinion. She reports upper abdominal pain before eating and lasting mins after eating. No GI bleeding but notable weight loss.  States maternal grand mother and maternal aunt both had "abdominal cancer"  Reports negative scopes    - Unintentional weight loss  New problem and has not undergone endoscopy/ colonoscopy since onset of weight loss in Jan 2020. She was 163 lbs in Jan 2020 and now weights 125 lbs (11/16/2020).  She noticed that clothing is loose.    The following portions of the patient's history were reviewed and updated as appropriate: allergies, current medications, past family history, past medical history, past social history, past surgical history and problem list.      Review of Systems   Constitutional: Positive for unexpected weight change. Negative for activity change, appetite change, chills, diaphoresis, fatigue and fever.   HENT: Negative for nasal congestion, dental problem, facial swelling, nosebleeds, postnasal drip, rhinorrhea, sore throat, tinnitus and trouble swallowing.    Eyes: Negative for pain, discharge, itching and visual disturbance.   Respiratory: Negative for apnea, chest tightness, shortness of breath, " "wheezing and stridor.    Cardiovascular: Negative for chest pain, palpitations and leg swelling.   Gastrointestinal: Negative for abdominal distention, abdominal pain, constipation, diarrhea, nausea, rectal pain and vomiting.   Endocrine: Negative for cold intolerance, heat intolerance and polyuria.   Genitourinary: Negative for difficulty urinating, dysuria, frequency, hematuria and urgency.   Musculoskeletal: Positive for arthralgias. Negative for neck pain.   Integumentary:  Negative for color change and rash.   Neurological: Positive for weakness and memory loss. Negative for dizziness, tremors, seizures, facial asymmetry and headaches.   Hematological: Negative for adenopathy. Does not bruise/bleed easily.   Psychiatric/Behavioral: Positive for confusion, decreased concentration and dysphoric mood. Negative for agitation, hallucinations, self-injury and suicidal ideas. The patient is not hyperactive.           Objective:       Vitals:    11/16/20 1404 11/16/20 1822   BP: (!) 230/114 (!) 230/114   Pulse: 64    Temp: 98.1 °F (36.7 °C)    TempSrc: Oral    SpO2: 100%    Weight: 56.9 kg (125 lb 7.1 oz)    Height: 5' 5" (1.651 m)      Physical Exam  Constitutional:       General: She is not in acute distress.     Appearance: She is well-developed. She is not diaphoretic.   HENT:      Head: Normocephalic and atraumatic.      Right Ear: External ear normal.      Left Ear: External ear normal.   Eyes:      General: No scleral icterus.        Right eye: No discharge.         Left eye: No discharge.      Conjunctiva/sclera: Conjunctivae normal.      Pupils: Pupils are equal, round, and reactive to light.   Neck:      Musculoskeletal: Normal range of motion and neck supple.      Thyroid: No thyromegaly.   Cardiovascular:      Rate and Rhythm: Normal rate and regular rhythm.      Heart sounds: Normal heart sounds. No murmur. No friction rub. No gallop.    Pulmonary:      Effort: Pulmonary effort is normal. No respiratory " distress.      Breath sounds: Normal breath sounds.   Chest:      Chest wall: No tenderness.   Abdominal:      General: Bowel sounds are normal.      Palpations: Abdomen is soft. There is no mass.      Tenderness: There is no abdominal tenderness. There is no guarding or rebound.      Comments: Loose folds of skin   Musculoskeletal: Normal range of motion.   Lymphadenopathy:      Cervical: No cervical adenopathy.   Skin:     General: Skin is warm.      Findings: No rash.   Neurological:      General: No focal deficit present.      Mental Status: She is alert and oriented to person, place, and time.      Cranial Nerves: No cranial nerve deficit.      Motor: No abnormal muscle tone.      Deep Tendon Reflexes: Reflexes normal.      Comments: Power 4+ throughout   Psychiatric:      Comments: Cooperative, slow speech         Assessment:       1. Annual physical exam    2. Unintentional weight loss    3. Essential hypertension    4. Mixed hyperlipidemia    5. Memory change    6. Parkinsonism, unspecified Parkinsonism type    7. Moderate persistent chronic asthma without complication    8. Laboratory exam ordered as part of routine general medical examination    9. Encounter for lipid screening for cardiovascular disease    10. Encounter for diabetic foot exam        Plan:       1. Annual physical exam  Age appropriate prevention guidelines implemented, unless patient refused  Encourage Advanced directives  Labs ordered   Immunizations reviewed. Encourage yearly influenza vaccine.  Patient Counseling:  --Nutrition: Encourage healthy food choices, adequate water intake, moderate sodium/caffeine intake, diet low in saturated fat and cholesterol. Importance of caloric balance and sufficient intake of fresh fruits, vegetables, fiber, calcium, iron, and 1 mg of folate supplement per day (for females capable of pregnancy).  --Exercise: Stressed the importance of regular exercise.   --Substance Abuse: Abstinence from tobacco  products. No more than 2 alcoholic drinks per day in men or 1 alcoholic drink per day in women. Avoid illicit drugs, driving or other dangerous activities under the influence. In the event of abuse, treatment offered.   --Sexuality: Safe sex practices to avoid sexually transmitted diseases; careful partner selection, use of condoms and contraceptive alternatives, avoidance of unintended pregnancy in fertile women of child-bearing age  --Injury prevention: encourage safety belts, safety helmets, smoke detector.  --Dental health: Discussed importance of regular tooth brushing X2 daily, flossing X1 daily, and routine dental visits.  --Encourage use of sun screen, wear sun protective clothing  --Encourage routine eye exams    2. Unintentional weight loss  -     CT Chest Abdomen Pelvis With Contrast; Future; Expected date: 11/16/2020  Follow with GI; may need to consider repeat scoping.    3. Essential hypertension  -     CBC Auto Differential; Future; Expected date: 11/16/2020  -     Comprehensive Metabolic Panel; Future; Expected date: 11/16/2020  -     TSH; Future; Expected date: 11/16/2020  The blood pressure readings appear to be above goal. There does not appear to be any symptoms such as chest pain, shortness of breath, palpitations, seizures or headaches at present. There are no new visual problems. We have discussed the importance of getting more BP data - nurse only appointments can help get data into the chart and/or continuous outpatient blood pressure monitoring. Blood pressure above goal can lead to end organ damage.   She will need to follow up with her primary care provider.  She may be forgetting to take her medications; concern for cognitive impairment. She has no neurological deficit requiring immediate imaging.  Follow with cardiology    Patient declined EKG and Chest X-ray    4. Mixed hyperlipidemia  On atorvastatin    5. Memory change  6. Parkinsonism, unspecified Parkinsonism type  She is not  taking Aricept or Sinemet.  Follows with neurology    7. Moderate persistent chronic asthma without complication  Inhaler therapy    8. Laboratory exam ordered as part of routine general medical examination  -     CBC Auto Differential; Future; Expected date: 11/16/2020  -     Comprehensive Metabolic Panel; Future; Expected date: 11/16/2020  -     TSH; Future; Expected date: 11/16/2020    9. Encounter for lipid screening for cardiovascular disease  -     Lipid Panel; Future; Expected date: 11/16/2020      I am unable to reconcile her medication list: patient is vague and unsure of her medications. Patient will need to follow up with her PCP. Report to ED: headache, chest pain, respiratory distress, worsening confusion/ lethargy.    Make sure that daughter signs the involvement of care. This patient is new to me and really needs to follow with her PCP to ensure BP control and to address ongoing issues. Labs done today even though patient is not fasting because I am concerned that she will not return; patient demonstrates nonadherence to medical recommendations.    I have asked schedulers to make patient a follow up appointment with her PCP asap.    Disclaimer: This note has been generated using voice-recognition software. There may be typographical errors that have been missed during proof-reading

## 2020-11-16 NOTE — PATIENT INSTRUCTIONS

## 2020-11-17 ENCOUNTER — TELEPHONE (OUTPATIENT)
Dept: PRIMARY CARE CLINIC | Facility: CLINIC | Age: 75
End: 2020-11-17

## 2020-11-17 LAB
ALBUMIN SERPL BCP-MCNC: 4.4 G/DL (ref 3.5–5.2)
ALP SERPL-CCNC: 41 U/L (ref 55–135)
ALT SERPL W/O P-5'-P-CCNC: 6 U/L (ref 10–44)
ANION GAP SERPL CALC-SCNC: 8 MMOL/L (ref 8–16)
AST SERPL-CCNC: 14 U/L (ref 10–40)
BILIRUB SERPL-MCNC: 0.5 MG/DL (ref 0.1–1)
BUN SERPL-MCNC: 23 MG/DL (ref 8–23)
CALCIUM SERPL-MCNC: 9.8 MG/DL (ref 8.7–10.5)
CHLORIDE SERPL-SCNC: 106 MMOL/L (ref 95–110)
CHOLEST SERPL-MCNC: 219 MG/DL (ref 120–199)
CHOLEST/HDLC SERPL: 2.9 {RATIO} (ref 2–5)
CO2 SERPL-SCNC: 28 MMOL/L (ref 23–29)
CREAT SERPL-MCNC: 1.3 MG/DL (ref 0.5–1.4)
EST. GFR  (AFRICAN AMERICAN): 46 ML/MIN/1.73 M^2
EST. GFR  (NON AFRICAN AMERICAN): 39.9 ML/MIN/1.73 M^2
GLUCOSE SERPL-MCNC: 92 MG/DL (ref 70–110)
HCV AB SERPL QL IA: NEGATIVE
HDLC SERPL-MCNC: 76 MG/DL (ref 40–75)
HDLC SERPL: 34.7 % (ref 20–50)
LDLC SERPL CALC-MCNC: 130.2 MG/DL (ref 63–159)
NONHDLC SERPL-MCNC: 143 MG/DL
POTASSIUM SERPL-SCNC: 4.2 MMOL/L (ref 3.5–5.1)
PROT SERPL-MCNC: 7.8 G/DL (ref 6–8.4)
SODIUM SERPL-SCNC: 142 MMOL/L (ref 136–145)
TRIGL SERPL-MCNC: 64 MG/DL (ref 30–150)
TSH SERPL DL<=0.005 MIU/L-ACNC: 1.98 UIU/ML (ref 0.4–4)

## 2020-11-17 NOTE — TELEPHONE ENCOUNTER
----- Message from Chapis Vazquez sent at 11/17/2020 10:03 AM CST -----  Patient stated that her BP tends to fluctuate and she has been monitoring her pressure. She's also made some lifestyle changes as well. Patient prefers to call back to schedule with her PCP if her pressure continues to fluctuate.     Thank You   ----- Message -----  From: Margie Payne MD  Sent: 11/17/2020   8:52 AM CST  To: Chapis Vazquez    As soon as possible.  ----- Message -----  From: Chapis Vazquez  Sent: 11/17/2020   8:13 AM CST  To: Margie Payne MD    How soon would you like her to come in?  ----- Message -----  From: Margie Payne MD  Sent: 11/16/2020   6:11 PM CST  To: [facility] Referral Coordinator    Please assist in scheduling patient a follow up appointment with her PCP: BP elevated.

## 2020-11-18 ENCOUNTER — TELEPHONE (OUTPATIENT)
Dept: PRIMARY CARE CLINIC | Facility: CLINIC | Age: 75
End: 2020-11-18

## 2020-11-18 RX ORDER — AMLODIPINE BESYLATE 5 MG/1
TABLET ORAL
Qty: 30 TABLET | Refills: 11 | Status: SHIPPED | OUTPATIENT
Start: 2020-11-18 | End: 2021-05-18

## 2020-11-18 NOTE — TELEPHONE ENCOUNTER
----- Message from Margie Payne MD sent at 11/18/2020 11:02 AM CST -----  Hello,    Normal thyroid function    Worsening kidney function.  Blood pressure control is important.  You need to take your blood pressure medication.  At her last visit you indicated that you were not taking amlodipine.  I strongly recommend that you start your amlodipine. I sent a prescription to your pharmacy.    Elevated total cholesterol.  You need to take your atorvastatin.  I sent prescription to your pharmacy.    Cell count stable.  No anemia.

## 2020-11-18 NOTE — TELEPHONE ENCOUNTER
The loose folds of skin due to weight loss is not an acute problem. Her elevated blood pressure is a big concern. CT ordered regarding weight loss- awaiting result. She has yet to make an appointment with her PCP.

## 2020-11-18 NOTE — TELEPHONE ENCOUNTER
----- Message from Patricia Leslie MA sent at 11/18/2020  8:10 AM CST -----  Regarding: FW: Patient Concerns  Would you like an Audio Visit to discuss the information further?  ----- Message -----  From: Chapis Vazquez  Sent: 11/17/2020  10:20 AM CST  To: Kerry Hanson Staff  Subject: Patient Concerns                                 Patient stated that in her visit on yesterday you'll talked about her weight loss and the problems it was causing with her skin. The patient has some concerns she would like to discuss. Please advise.    Thank You

## 2020-11-18 NOTE — TELEPHONE ENCOUNTER
Spoke with the pt, who advised that she wants to switch PCP to Dr. Payne. Advised the pt that in that case she needs to return for another appointment to follow up on her BP. Pt verbalized understanding, and stated that the appointment should be coordinated by one of her children. Pt requested that her son be contacted for the appointment scheduling. LVM requesting a return call.

## 2020-11-18 NOTE — TELEPHONE ENCOUNTER
Spoke with the pt's daughter, appointment scheduled for Friday at 1:30 PM. Pt advised of conversation.

## 2020-11-20 ENCOUNTER — OFFICE VISIT (OUTPATIENT)
Dept: PRIMARY CARE CLINIC | Facility: CLINIC | Age: 75
End: 2020-11-20
Payer: MEDICARE

## 2020-11-20 VITALS
WEIGHT: 125.69 LBS | OXYGEN SATURATION: 99 % | BODY MASS INDEX: 21.46 KG/M2 | HEIGHT: 64 IN | TEMPERATURE: 99 F | HEART RATE: 73 BPM | DIASTOLIC BLOOD PRESSURE: 100 MMHG | SYSTOLIC BLOOD PRESSURE: 186 MMHG

## 2020-11-20 DIAGNOSIS — R63.4 UNINTENTIONAL WEIGHT LOSS: ICD-10-CM

## 2020-11-20 DIAGNOSIS — G20.C PARKINSONISM, UNSPECIFIED PARKINSONISM TYPE: ICD-10-CM

## 2020-11-20 DIAGNOSIS — R41.3 MEMORY CHANGE: ICD-10-CM

## 2020-11-20 DIAGNOSIS — R09.89 LABILE BLOOD PRESSURE: Primary | ICD-10-CM

## 2020-11-20 PROCEDURE — 99999 PR PBB SHADOW E&M-EST. PATIENT-LVL IV: CPT | Mod: PBBFAC,,, | Performed by: FAMILY MEDICINE

## 2020-11-20 PROCEDURE — 3077F PR MOST RECENT SYSTOLIC BLOOD PRESSURE >= 140 MM HG: ICD-10-PCS | Mod: CPTII,S$GLB,, | Performed by: FAMILY MEDICINE

## 2020-11-20 PROCEDURE — 3077F SYST BP >= 140 MM HG: CPT | Mod: CPTII,S$GLB,, | Performed by: FAMILY MEDICINE

## 2020-11-20 PROCEDURE — 99213 PR OFFICE/OUTPT VISIT, EST, LEVL III, 20-29 MIN: ICD-10-PCS | Mod: S$GLB,,, | Performed by: FAMILY MEDICINE

## 2020-11-20 PROCEDURE — 3288F PR FALLS RISK ASSESSMENT DOCUMENTED: ICD-10-PCS | Mod: CPTII,S$GLB,, | Performed by: FAMILY MEDICINE

## 2020-11-20 PROCEDURE — 1159F MED LIST DOCD IN RCRD: CPT | Mod: S$GLB,,, | Performed by: FAMILY MEDICINE

## 2020-11-20 PROCEDURE — 1101F PR PT FALLS ASSESS DOC 0-1 FALLS W/OUT INJ PAST YR: ICD-10-PCS | Mod: CPTII,S$GLB,, | Performed by: FAMILY MEDICINE

## 2020-11-20 PROCEDURE — 3080F PR MOST RECENT DIASTOLIC BLOOD PRESSURE >= 90 MM HG: ICD-10-PCS | Mod: CPTII,S$GLB,, | Performed by: FAMILY MEDICINE

## 2020-11-20 PROCEDURE — 1159F PR MEDICATION LIST DOCUMENTED IN MEDICAL RECORD: ICD-10-PCS | Mod: S$GLB,,, | Performed by: FAMILY MEDICINE

## 2020-11-20 PROCEDURE — 99999 PR PBB SHADOW E&M-EST. PATIENT-LVL IV: ICD-10-PCS | Mod: PBBFAC,,, | Performed by: FAMILY MEDICINE

## 2020-11-20 PROCEDURE — 99213 OFFICE O/P EST LOW 20 MIN: CPT | Mod: S$GLB,,, | Performed by: FAMILY MEDICINE

## 2020-11-20 PROCEDURE — 1101F PT FALLS ASSESS-DOCD LE1/YR: CPT | Mod: CPTII,S$GLB,, | Performed by: FAMILY MEDICINE

## 2020-11-20 PROCEDURE — 3288F FALL RISK ASSESSMENT DOCD: CPT | Mod: CPTII,S$GLB,, | Performed by: FAMILY MEDICINE

## 2020-11-20 PROCEDURE — 3080F DIAST BP >= 90 MM HG: CPT | Mod: CPTII,S$GLB,, | Performed by: FAMILY MEDICINE

## 2020-11-20 RX ORDER — FLUDROCORTISONE ACETATE 0.1 MG/1
100 TABLET ORAL DAILY PRN
COMMUNITY
End: 2022-04-28

## 2020-11-20 NOTE — PROGRESS NOTES
Subjective:       Patient ID: Madeline Reilly is a 74 y.o. female.    Chief Complaint: Hypertension and loose folds of skin      She presents to follow up regarding elevated blood pressure and concern for loose folds of skin.    She follows with cardiology. Her labile blood pressures as thought to be attributed to autonomic dysfunction from parkinson's disease. On a regimen of amlodipine prn and midodrine prn. She has been lost to follow up with Neurology & is not currently on pharmacotherapy. Her GI specialist is outside of [facility].    She is scheduled for CT scan chest/abdomen and pelvis to evaluated unintentional weight loss. She is down at least 20 lbs from baseline and has loose skin in forearms and on abdomen which she would like removed.   New problem and has not undergone endoscopy/ colonoscopy since onset of weight loss in Jan 2020.     The following portions of the patient's history were reviewed and updated as appropriate: allergies, current medications, past family history, past medical history, past social history, past surgical history and problem list.     Review of Systems   Constitutional: Positive for activity change, fatigue and unexpected weight change. Negative for appetite change, chills, diaphoresis and fever.   Respiratory: Negative for apnea, cough, choking, chest tightness, shortness of breath, wheezing and stridor.    Cardiovascular: Negative for chest pain, palpitations, leg swelling and claudication.   Gastrointestinal: Negative for blood in stool, change in bowel habit, nausea, vomiting and change in bowel habit.   Musculoskeletal: Positive for myalgias.   Neurological: Positive for weakness and memory loss. Negative for dizziness and headaches.   Psychiatric/Behavioral: Positive for dysphoric mood.          Objective:       Vitals:    11/20/20 1337   BP: (!) 186/100   BP Location: Right arm   Patient Position: Sitting   BP Method: Medium (Manual)   Pulse: 73   Temp: 99 °F (37.2 °C)  "  TempSrc: Oral   SpO2: 99%   Weight: 57 kg (125 lb 10.6 oz)   Height: 5' 4" (1.626 m)     Physical Exam  Constitutional:       General: She is not in acute distress.     Appearance: She is not ill-appearing, toxic-appearing or diaphoretic.   HENT:      Head: Atraumatic.   Eyes:      Conjunctiva/sclera: Conjunctivae normal.   Neck:      Musculoskeletal: Neck supple.      Vascular: No carotid bruit.   Cardiovascular:      Rate and Rhythm: Normal rate and regular rhythm.      Pulses: Normal pulses.      Heart sounds: Normal heart sounds.   Pulmonary:      Effort: Pulmonary effort is normal.      Breath sounds: Normal breath sounds.   Abdominal:      General: Bowel sounds are normal.      Palpations: Abdomen is soft. There is no mass.      Tenderness: There is no abdominal tenderness. There is no guarding or rebound.   Neurological:      Mental Status: She is alert and oriented to person, place, and time.   Psychiatric:         Thought Content: Thought content normal.      Comments: Flat affect         Assessment:       1. Labile blood pressure    2. Unintentional weight loss    3. Parkinsonism, unspecified Parkinsonism type    4. Memory change        Plan:       1. Labile blood pressure  On regimen of amlodipine with parameters for elevated blood pressure and midodrine for low blood pressure  Follows with cardiology    2. Unintentional weight loss  Resulting in loose folds of skin; she is not interested in plastic surgery referral.   Discussed that instead of focusing on the cosmetic appearance, her efforts would better be served on exploring the underlying cause. She will follow through with CT chest/ abdomen and pelvis. Recommend that she makes an appointment with her GI physician. Boot and ensure to provide caloric demands.  She denies any change to her diet and exercise regimen.    3. Parkinsonism, unspecified Parkinsonism type  4.  Memory change  I advised that she makes an appointment with Neurology. Recommend " support groups for persons living with Parkinson's.     Disclaimer: This note has been generated using voice-recognition software. There may be typographical errors that have been missed during proof-reading

## 2020-11-23 ENCOUNTER — PATIENT MESSAGE (OUTPATIENT)
Dept: PRIMARY CARE CLINIC | Facility: CLINIC | Age: 75
End: 2020-11-23

## 2020-11-25 ENCOUNTER — TELEPHONE (OUTPATIENT)
Dept: PRIMARY CARE CLINIC | Facility: CLINIC | Age: 75
End: 2020-11-25

## 2020-11-25 NOTE — TELEPHONE ENCOUNTER
----- Message from Margie Payne MD sent at 11/25/2020  5:00 PM CST -----  Hello,    There are no abnormalities to explain your weight loss.  There is a benign/ not harmful liver cyst which does not require any surgical intervention or treatment.    Please let patient know message sent to portal

## 2020-11-27 ENCOUNTER — TELEPHONE (OUTPATIENT)
Dept: PRIMARY CARE CLINIC | Facility: CLINIC | Age: 75
End: 2020-11-27

## 2020-12-07 ENCOUNTER — PATIENT MESSAGE (OUTPATIENT)
Dept: PRIMARY CARE CLINIC | Facility: CLINIC | Age: 75
End: 2020-12-07

## 2021-01-24 ENCOUNTER — PATIENT OUTREACH (OUTPATIENT)
Dept: ADMINISTRATIVE | Facility: OTHER | Age: 76
End: 2021-01-24

## 2021-02-11 NOTE — TELEPHONE ENCOUNTER
----- Message from Margie Payne MD sent at 11/25/2020  5:00 PM CST -----  Hello,    There are no abnormalities to explain your weight loss.  There is a benign/ not harmful liver cyst which does not require any surgical intervention or treatment.    Please let patient know message sent to portal   cold application/unnecessary movement avoided/positioned to decrease pressure

## 2021-02-25 ENCOUNTER — OFFICE VISIT (OUTPATIENT)
Dept: URGENT CARE | Facility: CLINIC | Age: 76
End: 2021-02-25
Payer: MEDICARE

## 2021-02-25 ENCOUNTER — NURSE TRIAGE (OUTPATIENT)
Dept: ADMINISTRATIVE | Facility: CLINIC | Age: 76
End: 2021-02-25

## 2021-02-25 VITALS
RESPIRATION RATE: 18 BRPM | BODY MASS INDEX: 21.34 KG/M2 | HEIGHT: 64 IN | TEMPERATURE: 99 F | SYSTOLIC BLOOD PRESSURE: 190 MMHG | OXYGEN SATURATION: 99 % | DIASTOLIC BLOOD PRESSURE: 92 MMHG | WEIGHT: 125 LBS | HEART RATE: 68 BPM

## 2021-02-25 DIAGNOSIS — R03.0 ELEVATED BLOOD PRESSURE READING: Primary | ICD-10-CM

## 2021-02-25 PROCEDURE — 99202 PR OFFICE/OUTPT VISIT, NEW, LEVL II, 15-29 MIN: ICD-10-PCS | Mod: S$GLB,,, | Performed by: NURSE PRACTITIONER

## 2021-02-25 PROCEDURE — 99499 UNLISTED E&M SERVICE: CPT | Mod: S$GLB,,, | Performed by: NURSE PRACTITIONER

## 2021-02-25 PROCEDURE — 99202 OFFICE O/P NEW SF 15 MIN: CPT | Mod: S$GLB,,, | Performed by: NURSE PRACTITIONER

## 2021-02-25 PROCEDURE — 99499 RISK ADDL DX/OHS AUDIT: ICD-10-PCS | Mod: S$GLB,,, | Performed by: NURSE PRACTITIONER

## 2021-02-26 ENCOUNTER — TELEPHONE (OUTPATIENT)
Dept: PRIMARY CARE CLINIC | Facility: CLINIC | Age: 76
End: 2021-02-26

## 2021-04-19 ENCOUNTER — TELEPHONE (OUTPATIENT)
Dept: PRIMARY CARE CLINIC | Facility: CLINIC | Age: 76
End: 2021-04-19

## 2021-04-26 ENCOUNTER — TELEPHONE (OUTPATIENT)
Dept: PRIMARY CARE CLINIC | Facility: CLINIC | Age: 76
End: 2021-04-26

## 2021-04-26 ENCOUNTER — PATIENT MESSAGE (OUTPATIENT)
Dept: RESEARCH | Facility: HOSPITAL | Age: 76
End: 2021-04-26

## 2021-04-27 ENCOUNTER — OFFICE VISIT (OUTPATIENT)
Dept: PRIMARY CARE CLINIC | Facility: CLINIC | Age: 76
End: 2021-04-27
Payer: MEDICARE

## 2021-04-27 DIAGNOSIS — R41.3 MEMORY CHANGE: ICD-10-CM

## 2021-04-27 DIAGNOSIS — G20.C PARKINSONISM, UNSPECIFIED PARKINSONISM TYPE: ICD-10-CM

## 2021-04-27 DIAGNOSIS — R63.4 UNINTENTIONAL WEIGHT LOSS: Primary | ICD-10-CM

## 2021-04-27 PROCEDURE — 99442 PR PHYSICIAN TELEPHONE EVALUATION 11-20 MIN: ICD-10-PCS | Mod: 95,,, | Performed by: FAMILY MEDICINE

## 2021-04-27 PROCEDURE — 99442 PR PHYSICIAN TELEPHONE EVALUATION 11-20 MIN: CPT | Mod: 95,,, | Performed by: FAMILY MEDICINE

## 2021-07-15 ENCOUNTER — TELEPHONE (OUTPATIENT)
Dept: PRIMARY CARE CLINIC | Facility: CLINIC | Age: 76
End: 2021-07-15

## 2021-07-16 ENCOUNTER — OFFICE VISIT (OUTPATIENT)
Dept: PRIMARY CARE CLINIC | Facility: CLINIC | Age: 76
End: 2021-07-16
Payer: MEDICARE

## 2021-07-16 ENCOUNTER — HOSPITAL ENCOUNTER (OUTPATIENT)
Dept: RADIOLOGY | Facility: HOSPITAL | Age: 76
Discharge: HOME | End: 2021-07-16
Attending: FAMILY MEDICINE
Payer: MEDICARE

## 2021-07-16 VITALS
TEMPERATURE: 98 F | HEIGHT: 64 IN | BODY MASS INDEX: 19.84 KG/M2 | HEART RATE: 68 BPM | OXYGEN SATURATION: 99 % | WEIGHT: 116.19 LBS | DIASTOLIC BLOOD PRESSURE: 104 MMHG | SYSTOLIC BLOOD PRESSURE: 220 MMHG

## 2021-07-16 DIAGNOSIS — R06.02 SOB (SHORTNESS OF BREATH): Primary | ICD-10-CM

## 2021-07-16 DIAGNOSIS — I10 ESSENTIAL HYPERTENSION: ICD-10-CM

## 2021-07-16 DIAGNOSIS — M54.50 CHRONIC BILATERAL LOW BACK PAIN WITHOUT SCIATICA: ICD-10-CM

## 2021-07-16 DIAGNOSIS — M47.816 SPONDYLOSIS OF LUMBAR REGION WITHOUT MYELOPATHY OR RADICULOPATHY: ICD-10-CM

## 2021-07-16 DIAGNOSIS — G20.A1 PARKINSON'S DISEASE: ICD-10-CM

## 2021-07-16 DIAGNOSIS — F41.9 ANXIETY: ICD-10-CM

## 2021-07-16 DIAGNOSIS — M54.9 DORSALGIA, UNSPECIFIED: ICD-10-CM

## 2021-07-16 DIAGNOSIS — R41.3 MEMORY CHANGE: ICD-10-CM

## 2021-07-16 DIAGNOSIS — G89.29 CHRONIC BILATERAL LOW BACK PAIN WITHOUT SCIATICA: ICD-10-CM

## 2021-07-16 PROCEDURE — 3288F FALL RISK ASSESSMENT DOCD: CPT | Mod: CPTII,S$GLB,, | Performed by: FAMILY MEDICINE

## 2021-07-16 PROCEDURE — 72110 X-RAY EXAM L-2 SPINE 4/>VWS: CPT | Mod: 26,,, | Performed by: RADIOLOGY

## 2021-07-16 PROCEDURE — 72110 XR LUMBAR SPINE 5 VIEW WITH FLEX AND EXT: ICD-10-PCS | Mod: 26,,, | Performed by: RADIOLOGY

## 2021-07-16 PROCEDURE — 3080F DIAST BP >= 90 MM HG: CPT | Mod: CPTII,S$GLB,, | Performed by: FAMILY MEDICINE

## 2021-07-16 PROCEDURE — 99499 UNLISTED E&M SERVICE: CPT | Mod: S$GLB,,, | Performed by: FAMILY MEDICINE

## 2021-07-16 PROCEDURE — 3077F SYST BP >= 140 MM HG: CPT | Mod: CPTII,S$GLB,, | Performed by: FAMILY MEDICINE

## 2021-07-16 PROCEDURE — 1160F RVW MEDS BY RX/DR IN RCRD: CPT | Mod: CPTII,S$GLB,, | Performed by: FAMILY MEDICINE

## 2021-07-16 PROCEDURE — 1159F MED LIST DOCD IN RCRD: CPT | Mod: CPTII,S$GLB,, | Performed by: FAMILY MEDICINE

## 2021-07-16 PROCEDURE — 1126F AMNT PAIN NOTED NONE PRSNT: CPT | Mod: CPTII,S$GLB,, | Performed by: FAMILY MEDICINE

## 2021-07-16 PROCEDURE — 99999 PR PBB SHADOW E&M-EST. PATIENT-LVL V: CPT | Mod: PBBFAC,,, | Performed by: FAMILY MEDICINE

## 2021-07-16 PROCEDURE — 1101F PT FALLS ASSESS-DOCD LE1/YR: CPT | Mod: CPTII,S$GLB,, | Performed by: FAMILY MEDICINE

## 2021-07-16 PROCEDURE — 3288F PR FALLS RISK ASSESSMENT DOCUMENTED: ICD-10-PCS | Mod: CPTII,S$GLB,, | Performed by: FAMILY MEDICINE

## 2021-07-16 PROCEDURE — 1101F PR PT FALLS ASSESS DOC 0-1 FALLS W/OUT INJ PAST YR: ICD-10-PCS | Mod: CPTII,S$GLB,, | Performed by: FAMILY MEDICINE

## 2021-07-16 PROCEDURE — 1159F PR MEDICATION LIST DOCUMENTED IN MEDICAL RECORD: ICD-10-PCS | Mod: CPTII,S$GLB,, | Performed by: FAMILY MEDICINE

## 2021-07-16 PROCEDURE — 99215 OFFICE O/P EST HI 40 MIN: CPT | Mod: S$GLB,,, | Performed by: FAMILY MEDICINE

## 2021-07-16 PROCEDURE — 72110 X-RAY EXAM L-2 SPINE 4/>VWS: CPT | Mod: TC,PN

## 2021-07-16 PROCEDURE — 1160F PR REVIEW ALL MEDS BY PRESCRIBER/CLIN PHARMACIST DOCUMENTED: ICD-10-PCS | Mod: CPTII,S$GLB,, | Performed by: FAMILY MEDICINE

## 2021-07-16 PROCEDURE — 99215 PR OFFICE/OUTPT VISIT, EST, LEVL V, 40-54 MIN: ICD-10-PCS | Mod: S$GLB,,, | Performed by: FAMILY MEDICINE

## 2021-07-16 PROCEDURE — 3080F PR MOST RECENT DIASTOLIC BLOOD PRESSURE >= 90 MM HG: ICD-10-PCS | Mod: CPTII,S$GLB,, | Performed by: FAMILY MEDICINE

## 2021-07-16 PROCEDURE — 99999 PR PBB SHADOW E&M-EST. PATIENT-LVL V: ICD-10-PCS | Mod: PBBFAC,,, | Performed by: FAMILY MEDICINE

## 2021-07-16 PROCEDURE — 99499 RISK ADDL DX/OHS AUDIT: ICD-10-PCS | Mod: S$GLB,,, | Performed by: FAMILY MEDICINE

## 2021-07-16 PROCEDURE — 1126F PR PAIN SEVERITY QUANTIFIED, NO PAIN PRESENT: ICD-10-PCS | Mod: CPTII,S$GLB,, | Performed by: FAMILY MEDICINE

## 2021-07-16 PROCEDURE — 3077F PR MOST RECENT SYSTOLIC BLOOD PRESSURE >= 140 MM HG: ICD-10-PCS | Mod: CPTII,S$GLB,, | Performed by: FAMILY MEDICINE

## 2021-07-16 RX ORDER — GABAPENTIN 100 MG/1
100 CAPSULE ORAL NIGHTLY
Qty: 90 CAPSULE | Refills: 3 | Status: SHIPPED | OUTPATIENT
Start: 2021-07-16 | End: 2021-08-06 | Stop reason: SDUPTHER

## 2021-07-19 ENCOUNTER — TELEPHONE (OUTPATIENT)
Dept: PRIMARY CARE CLINIC | Facility: CLINIC | Age: 76
End: 2021-07-19

## 2021-07-19 PROBLEM — M47.816 SPONDYLOSIS OF LUMBAR REGION WITHOUT MYELOPATHY OR RADICULOPATHY: Status: ACTIVE | Noted: 2021-07-19

## 2021-08-06 ENCOUNTER — OFFICE VISIT (OUTPATIENT)
Dept: PAIN MEDICINE | Facility: CLINIC | Age: 76
End: 2021-08-06
Payer: MEDICARE

## 2021-08-06 VITALS
DIASTOLIC BLOOD PRESSURE: 104 MMHG | BODY MASS INDEX: 19.68 KG/M2 | WEIGHT: 114.63 LBS | SYSTOLIC BLOOD PRESSURE: 180 MMHG

## 2021-08-06 DIAGNOSIS — M79.18 MYOFASCIAL PAIN SYNDROME: Primary | ICD-10-CM

## 2021-08-06 DIAGNOSIS — M54.50 CHRONIC BILATERAL LOW BACK PAIN WITHOUT SCIATICA: ICD-10-CM

## 2021-08-06 DIAGNOSIS — G89.29 CHRONIC BILATERAL LOW BACK PAIN WITHOUT SCIATICA: ICD-10-CM

## 2021-08-06 PROCEDURE — 1159F PR MEDICATION LIST DOCUMENTED IN MEDICAL RECORD: ICD-10-PCS | Mod: CPTII,S$GLB,, | Performed by: PAIN MEDICINE

## 2021-08-06 PROCEDURE — 99204 PR OFFICE/OUTPT VISIT, NEW, LEVL IV, 45-59 MIN: ICD-10-PCS | Mod: S$GLB,,, | Performed by: PAIN MEDICINE

## 2021-08-06 PROCEDURE — 3077F PR MOST RECENT SYSTOLIC BLOOD PRESSURE >= 140 MM HG: ICD-10-PCS | Mod: CPTII,S$GLB,, | Performed by: PAIN MEDICINE

## 2021-08-06 PROCEDURE — 1125F AMNT PAIN NOTED PAIN PRSNT: CPT | Mod: CPTII,S$GLB,, | Performed by: PAIN MEDICINE

## 2021-08-06 PROCEDURE — 99204 OFFICE O/P NEW MOD 45 MIN: CPT | Mod: S$GLB,,, | Performed by: PAIN MEDICINE

## 2021-08-06 PROCEDURE — 3080F PR MOST RECENT DIASTOLIC BLOOD PRESSURE >= 90 MM HG: ICD-10-PCS | Mod: CPTII,S$GLB,, | Performed by: PAIN MEDICINE

## 2021-08-06 PROCEDURE — 99999 PR PBB SHADOW E&M-EST. PATIENT-LVL III: ICD-10-PCS | Mod: PBBFAC,,, | Performed by: PAIN MEDICINE

## 2021-08-06 PROCEDURE — 1125F PR PAIN SEVERITY QUANTIFIED, PAIN PRESENT: ICD-10-PCS | Mod: CPTII,S$GLB,, | Performed by: PAIN MEDICINE

## 2021-08-06 PROCEDURE — 3077F SYST BP >= 140 MM HG: CPT | Mod: CPTII,S$GLB,, | Performed by: PAIN MEDICINE

## 2021-08-06 PROCEDURE — 1159F MED LIST DOCD IN RCRD: CPT | Mod: CPTII,S$GLB,, | Performed by: PAIN MEDICINE

## 2021-08-06 PROCEDURE — 99999 PR PBB SHADOW E&M-EST. PATIENT-LVL III: CPT | Mod: PBBFAC,,, | Performed by: PAIN MEDICINE

## 2021-08-06 PROCEDURE — 3080F DIAST BP >= 90 MM HG: CPT | Mod: CPTII,S$GLB,, | Performed by: PAIN MEDICINE

## 2021-08-06 RX ORDER — DICLOFENAC SODIUM 10 MG/G
2 GEL TOPICAL 4 TIMES DAILY
Qty: 200 G | Refills: 2 | Status: SHIPPED | OUTPATIENT
Start: 2021-08-06 | End: 2023-05-30 | Stop reason: SDUPTHER

## 2021-08-07 PROBLEM — G89.29 CHRONIC BILATERAL LOW BACK PAIN WITHOUT SCIATICA: Status: ACTIVE | Noted: 2021-08-07

## 2021-08-07 PROBLEM — M79.18 MYOFASCIAL PAIN SYNDROME: Status: ACTIVE | Noted: 2021-08-07

## 2021-08-07 PROBLEM — M54.50 CHRONIC BILATERAL LOW BACK PAIN WITHOUT SCIATICA: Status: ACTIVE | Noted: 2021-08-07

## 2021-09-03 ENCOUNTER — TELEPHONE (OUTPATIENT)
Dept: PULMONOLOGY | Facility: CLINIC | Age: 76
End: 2021-09-03

## 2021-09-27 ENCOUNTER — TELEPHONE (OUTPATIENT)
Dept: NEUROLOGY | Facility: CLINIC | Age: 76
End: 2021-09-27

## 2021-10-04 ENCOUNTER — OFFICE VISIT (OUTPATIENT)
Dept: NEUROLOGY | Facility: CLINIC | Age: 76
End: 2021-10-04
Payer: MEDICARE

## 2021-10-04 DIAGNOSIS — G20.A1 PARKINSON'S DISEASE: ICD-10-CM

## 2021-10-04 DIAGNOSIS — R41.3 MEMORY CHANGE: Primary | ICD-10-CM

## 2021-10-04 PROCEDURE — 99214 OFFICE O/P EST MOD 30 MIN: CPT | Mod: 95,,, | Performed by: PSYCHIATRY & NEUROLOGY

## 2021-10-04 PROCEDURE — 99214 PR OFFICE/OUTPT VISIT, EST, LEVL IV, 30-39 MIN: ICD-10-PCS | Mod: 95,,, | Performed by: PSYCHIATRY & NEUROLOGY

## 2021-10-04 PROCEDURE — 99499 UNLISTED E&M SERVICE: CPT | Mod: 95,,, | Performed by: PSYCHIATRY & NEUROLOGY

## 2021-10-04 PROCEDURE — 99499 RISK ADDL DX/OHS AUDIT: ICD-10-PCS | Mod: 95,,, | Performed by: PSYCHIATRY & NEUROLOGY

## 2021-10-04 RX ORDER — DONEPEZIL HYDROCHLORIDE 5 MG/1
5 TABLET, FILM COATED ORAL NIGHTLY
Qty: 30 TABLET | Refills: 11 | Status: SHIPPED | OUTPATIENT
Start: 2021-10-04 | End: 2022-04-28

## 2021-10-08 ENCOUNTER — PATIENT MESSAGE (OUTPATIENT)
Dept: NEUROLOGY | Facility: CLINIC | Age: 76
End: 2021-10-08

## 2021-10-19 ENCOUNTER — OFFICE VISIT (OUTPATIENT)
Dept: NEUROLOGY | Facility: CLINIC | Age: 76
End: 2021-10-19
Payer: MEDICARE

## 2021-10-19 VITALS
HEART RATE: 65 BPM | DIASTOLIC BLOOD PRESSURE: 92 MMHG | WEIGHT: 112.56 LBS | SYSTOLIC BLOOD PRESSURE: 185 MMHG | BODY MASS INDEX: 19.32 KG/M2

## 2021-10-19 DIAGNOSIS — R41.3 MEMORY CHANGE: Primary | ICD-10-CM

## 2021-10-19 DIAGNOSIS — G20.A1 PARKINSON'S DISEASE: ICD-10-CM

## 2021-10-19 PROCEDURE — 99214 PR OFFICE/OUTPT VISIT, EST, LEVL IV, 30-39 MIN: ICD-10-PCS | Mod: S$GLB,,, | Performed by: PSYCHIATRY & NEUROLOGY

## 2021-10-19 PROCEDURE — 3077F SYST BP >= 140 MM HG: CPT | Mod: CPTII,S$GLB,, | Performed by: PSYCHIATRY & NEUROLOGY

## 2021-10-19 PROCEDURE — 99214 OFFICE O/P EST MOD 30 MIN: CPT | Mod: S$GLB,,, | Performed by: PSYCHIATRY & NEUROLOGY

## 2021-10-19 PROCEDURE — 1159F MED LIST DOCD IN RCRD: CPT | Mod: CPTII,S$GLB,, | Performed by: PSYCHIATRY & NEUROLOGY

## 2021-10-19 PROCEDURE — 3077F PR MOST RECENT SYSTOLIC BLOOD PRESSURE >= 140 MM HG: ICD-10-PCS | Mod: CPTII,S$GLB,, | Performed by: PSYCHIATRY & NEUROLOGY

## 2021-10-19 PROCEDURE — 1159F PR MEDICATION LIST DOCUMENTED IN MEDICAL RECORD: ICD-10-PCS | Mod: CPTII,S$GLB,, | Performed by: PSYCHIATRY & NEUROLOGY

## 2021-10-19 PROCEDURE — 3080F PR MOST RECENT DIASTOLIC BLOOD PRESSURE >= 90 MM HG: ICD-10-PCS | Mod: CPTII,S$GLB,, | Performed by: PSYCHIATRY & NEUROLOGY

## 2021-10-19 PROCEDURE — 1126F AMNT PAIN NOTED NONE PRSNT: CPT | Mod: CPTII,S$GLB,, | Performed by: PSYCHIATRY & NEUROLOGY

## 2021-10-19 PROCEDURE — 1100F PR PT FALLS ASSESS DOC 2+ FALLS/FALL W/INJURY/YR: ICD-10-PCS | Mod: CPTII,S$GLB,, | Performed by: PSYCHIATRY & NEUROLOGY

## 2021-10-19 PROCEDURE — 99999 PR PBB SHADOW E&M-EST. PATIENT-LVL III: CPT | Mod: PBBFAC,,, | Performed by: PSYCHIATRY & NEUROLOGY

## 2021-10-19 PROCEDURE — 3288F FALL RISK ASSESSMENT DOCD: CPT | Mod: CPTII,S$GLB,, | Performed by: PSYCHIATRY & NEUROLOGY

## 2021-10-19 PROCEDURE — 99999 PR PBB SHADOW E&M-EST. PATIENT-LVL III: ICD-10-PCS | Mod: PBBFAC,,, | Performed by: PSYCHIATRY & NEUROLOGY

## 2021-10-19 PROCEDURE — 1100F PTFALLS ASSESS-DOCD GE2>/YR: CPT | Mod: CPTII,S$GLB,, | Performed by: PSYCHIATRY & NEUROLOGY

## 2021-10-19 PROCEDURE — 1126F PR PAIN SEVERITY QUANTIFIED, NO PAIN PRESENT: ICD-10-PCS | Mod: CPTII,S$GLB,, | Performed by: PSYCHIATRY & NEUROLOGY

## 2021-10-19 PROCEDURE — 3288F PR FALLS RISK ASSESSMENT DOCUMENTED: ICD-10-PCS | Mod: CPTII,S$GLB,, | Performed by: PSYCHIATRY & NEUROLOGY

## 2021-10-19 PROCEDURE — 3080F DIAST BP >= 90 MM HG: CPT | Mod: CPTII,S$GLB,, | Performed by: PSYCHIATRY & NEUROLOGY

## 2021-10-19 RX ORDER — CARBIDOPA AND LEVODOPA 25; 100 MG/1; MG/1
0.5 TABLET ORAL 3 TIMES DAILY
Qty: 45 TABLET | Refills: 11 | Status: SHIPPED | OUTPATIENT
Start: 2021-10-19 | End: 2022-10-14

## 2021-11-09 ENCOUNTER — PATIENT MESSAGE (OUTPATIENT)
Dept: NEUROLOGY | Facility: CLINIC | Age: 76
End: 2021-11-09

## 2021-11-09 ENCOUNTER — PATIENT MESSAGE (OUTPATIENT)
Dept: PRIMARY CARE CLINIC | Facility: CLINIC | Age: 76
End: 2021-11-09

## 2021-11-11 ENCOUNTER — PATIENT MESSAGE (OUTPATIENT)
Dept: PRIMARY CARE CLINIC | Facility: CLINIC | Age: 76
End: 2021-11-11

## 2021-11-15 ENCOUNTER — PATIENT MESSAGE (OUTPATIENT)
Dept: PRIMARY CARE CLINIC | Facility: CLINIC | Age: 76
End: 2021-11-15

## 2021-11-22 ENCOUNTER — PATIENT MESSAGE (OUTPATIENT)
Dept: NEUROLOGY | Facility: CLINIC | Age: 76
End: 2021-11-22

## 2021-11-23 ENCOUNTER — OFFICE VISIT (OUTPATIENT)
Dept: PRIMARY CARE CLINIC | Facility: CLINIC | Age: 76
End: 2021-11-23
Payer: MEDICARE

## 2021-11-23 VITALS
BODY MASS INDEX: 19.08 KG/M2 | RESPIRATION RATE: 17 BRPM | HEIGHT: 64 IN | DIASTOLIC BLOOD PRESSURE: 78 MMHG | HEART RATE: 64 BPM | OXYGEN SATURATION: 97 % | WEIGHT: 111.75 LBS | SYSTOLIC BLOOD PRESSURE: 148 MMHG | TEMPERATURE: 98 F

## 2021-11-23 DIAGNOSIS — G89.29 CHRONIC BILATERAL LOW BACK PAIN WITHOUT SCIATICA: ICD-10-CM

## 2021-11-23 DIAGNOSIS — M79.18 MYOFASCIAL PAIN SYNDROME: ICD-10-CM

## 2021-11-23 DIAGNOSIS — J44.9 CHRONIC OBSTRUCTIVE PULMONARY DISEASE, UNSPECIFIED COPD TYPE: ICD-10-CM

## 2021-11-23 DIAGNOSIS — Z78.9 IMPAIRED MOBILITY AND ACTIVITIES OF DAILY LIVING: ICD-10-CM

## 2021-11-23 DIAGNOSIS — I10 PRIMARY HYPERTENSION: ICD-10-CM

## 2021-11-23 DIAGNOSIS — I50.32 CHRONIC DIASTOLIC CONGESTIVE HEART FAILURE: ICD-10-CM

## 2021-11-23 DIAGNOSIS — Z74.8 ASSISTANCE NEEDED WITH TRANSPORTATION: Primary | ICD-10-CM

## 2021-11-23 DIAGNOSIS — R41.3 MEMORY CHANGE: ICD-10-CM

## 2021-11-23 DIAGNOSIS — E27.9 DISORDER OF ADRENAL GLAND, UNSPECIFIED: ICD-10-CM

## 2021-11-23 DIAGNOSIS — G20.A1 PARKINSON'S DISEASE: ICD-10-CM

## 2021-11-23 DIAGNOSIS — M47.816 SPONDYLOSIS OF LUMBAR REGION WITHOUT MYELOPATHY OR RADICULOPATHY: ICD-10-CM

## 2021-11-23 DIAGNOSIS — I95.1 ORTHOSTATIC HYPOTENSION: ICD-10-CM

## 2021-11-23 DIAGNOSIS — M54.50 CHRONIC BILATERAL LOW BACK PAIN WITHOUT SCIATICA: ICD-10-CM

## 2021-11-23 DIAGNOSIS — Z74.09 IMPAIRED MOBILITY AND ACTIVITIES OF DAILY LIVING: ICD-10-CM

## 2021-11-23 PROCEDURE — 99499 RISK ADDL DX/OHS AUDIT: ICD-10-PCS | Mod: S$PBB,,, | Performed by: FAMILY MEDICINE

## 2021-11-23 PROCEDURE — 99499 UNLISTED E&M SERVICE: CPT | Mod: S$PBB,,, | Performed by: FAMILY MEDICINE

## 2021-11-23 PROCEDURE — 99214 PR OFFICE/OUTPT VISIT, EST, LEVL IV, 30-39 MIN: ICD-10-PCS | Mod: S$GLB,,, | Performed by: FAMILY MEDICINE

## 2021-11-23 PROCEDURE — 99214 OFFICE O/P EST MOD 30 MIN: CPT | Mod: S$GLB,,, | Performed by: FAMILY MEDICINE

## 2021-11-23 PROCEDURE — 99999 PR PBB SHADOW E&M-EST. PATIENT-LVL V: CPT | Mod: PBBFAC,,, | Performed by: FAMILY MEDICINE

## 2021-11-23 PROCEDURE — 99999 PR PBB SHADOW E&M-EST. PATIENT-LVL V: ICD-10-PCS | Mod: PBBFAC,,, | Performed by: FAMILY MEDICINE

## 2021-11-23 RX ORDER — CEPHALEXIN 500 MG/1
500 CAPSULE ORAL 2 TIMES DAILY
COMMUNITY
Start: 2021-11-21 | End: 2022-07-21

## 2021-11-24 PROBLEM — E27.9 DISORDER OF ADRENAL GLAND, UNSPECIFIED: Status: ACTIVE | Noted: 2021-11-24

## 2021-11-24 PROBLEM — Z74.8 ASSISTANCE NEEDED WITH TRANSPORTATION: Status: ACTIVE | Noted: 2021-11-24

## 2021-11-24 PROBLEM — Z78.9 IMPAIRED MOBILITY AND ACTIVITIES OF DAILY LIVING: Status: ACTIVE | Noted: 2021-11-24

## 2021-11-24 PROBLEM — J44.9 CHRONIC OBSTRUCTIVE PULMONARY DISEASE, UNSPECIFIED COPD TYPE: Status: ACTIVE | Noted: 2021-11-24

## 2021-11-24 PROBLEM — I50.32 CHRONIC DIASTOLIC CONGESTIVE HEART FAILURE: Status: ACTIVE | Noted: 2021-11-24

## 2021-11-24 PROBLEM — Z74.09 IMPAIRED MOBILITY AND ACTIVITIES OF DAILY LIVING: Status: ACTIVE | Noted: 2021-11-24

## 2022-02-11 ENCOUNTER — PES CALL (OUTPATIENT)
Dept: ADMINISTRATIVE | Facility: CLINIC | Age: 77
End: 2022-02-11
Payer: MEDICARE

## 2022-02-21 ENCOUNTER — PATIENT MESSAGE (OUTPATIENT)
Dept: NEUROLOGY | Facility: CLINIC | Age: 77
End: 2022-02-21
Payer: MEDICARE

## 2022-03-06 ENCOUNTER — PATIENT MESSAGE (OUTPATIENT)
Dept: PRIMARY CARE CLINIC | Facility: CLINIC | Age: 77
End: 2022-03-06
Payer: MEDICARE

## 2022-03-17 ENCOUNTER — PATIENT MESSAGE (OUTPATIENT)
Dept: PRIMARY CARE CLINIC | Facility: CLINIC | Age: 77
End: 2022-03-17
Payer: MEDICARE

## 2022-03-31 ENCOUNTER — PES CALL (OUTPATIENT)
Dept: ADMINISTRATIVE | Facility: CLINIC | Age: 77
End: 2022-03-31
Payer: MEDICARE

## 2022-04-28 ENCOUNTER — OFFICE VISIT (OUTPATIENT)
Dept: NEUROLOGY | Facility: CLINIC | Age: 77
End: 2022-04-28
Payer: MEDICARE

## 2022-04-28 VITALS
HEART RATE: 57 BPM | SYSTOLIC BLOOD PRESSURE: 218 MMHG | DIASTOLIC BLOOD PRESSURE: 94 MMHG | HEIGHT: 64 IN | BODY MASS INDEX: 19.19 KG/M2

## 2022-04-28 DIAGNOSIS — I10 PRIMARY HYPERTENSION: ICD-10-CM

## 2022-04-28 DIAGNOSIS — G20.A1 PARKINSON'S DISEASE: ICD-10-CM

## 2022-04-28 DIAGNOSIS — R41.3 MEMORY CHANGE: Primary | ICD-10-CM

## 2022-04-28 PROCEDURE — 1159F MED LIST DOCD IN RCRD: CPT | Mod: CPTII,S$GLB,, | Performed by: PSYCHIATRY & NEUROLOGY

## 2022-04-28 PROCEDURE — 3288F PR FALLS RISK ASSESSMENT DOCUMENTED: ICD-10-PCS | Mod: CPTII,S$GLB,, | Performed by: PSYCHIATRY & NEUROLOGY

## 2022-04-28 PROCEDURE — 99999 PR PBB SHADOW E&M-EST. PATIENT-LVL IV: CPT | Mod: PBBFAC,,, | Performed by: PSYCHIATRY & NEUROLOGY

## 2022-04-28 PROCEDURE — 99999 PR PBB SHADOW E&M-EST. PATIENT-LVL IV: ICD-10-PCS | Mod: PBBFAC,,, | Performed by: PSYCHIATRY & NEUROLOGY

## 2022-04-28 PROCEDURE — 1159F PR MEDICATION LIST DOCUMENTED IN MEDICAL RECORD: ICD-10-PCS | Mod: CPTII,S$GLB,, | Performed by: PSYCHIATRY & NEUROLOGY

## 2022-04-28 PROCEDURE — 1126F PR PAIN SEVERITY QUANTIFIED, NO PAIN PRESENT: ICD-10-PCS | Mod: CPTII,S$GLB,, | Performed by: PSYCHIATRY & NEUROLOGY

## 2022-04-28 PROCEDURE — 1101F PR PT FALLS ASSESS DOC 0-1 FALLS W/OUT INJ PAST YR: ICD-10-PCS | Mod: CPTII,S$GLB,, | Performed by: PSYCHIATRY & NEUROLOGY

## 2022-04-28 PROCEDURE — 1101F PT FALLS ASSESS-DOCD LE1/YR: CPT | Mod: CPTII,S$GLB,, | Performed by: PSYCHIATRY & NEUROLOGY

## 2022-04-28 PROCEDURE — 99499 UNLISTED E&M SERVICE: CPT | Mod: S$GLB,,, | Performed by: PSYCHIATRY & NEUROLOGY

## 2022-04-28 PROCEDURE — 3077F PR MOST RECENT SYSTOLIC BLOOD PRESSURE >= 140 MM HG: ICD-10-PCS | Mod: CPTII,S$GLB,, | Performed by: PSYCHIATRY & NEUROLOGY

## 2022-04-28 PROCEDURE — 3077F SYST BP >= 140 MM HG: CPT | Mod: CPTII,S$GLB,, | Performed by: PSYCHIATRY & NEUROLOGY

## 2022-04-28 PROCEDURE — 99215 PR OFFICE/OUTPT VISIT, EST, LEVL V, 40-54 MIN: ICD-10-PCS | Mod: S$GLB,,, | Performed by: PSYCHIATRY & NEUROLOGY

## 2022-04-28 PROCEDURE — 99499 RISK ADDL DX/OHS AUDIT: ICD-10-PCS | Mod: S$GLB,,, | Performed by: PSYCHIATRY & NEUROLOGY

## 2022-04-28 PROCEDURE — 3080F PR MOST RECENT DIASTOLIC BLOOD PRESSURE >= 90 MM HG: ICD-10-PCS | Mod: CPTII,S$GLB,, | Performed by: PSYCHIATRY & NEUROLOGY

## 2022-04-28 PROCEDURE — 3080F DIAST BP >= 90 MM HG: CPT | Mod: CPTII,S$GLB,, | Performed by: PSYCHIATRY & NEUROLOGY

## 2022-04-28 PROCEDURE — 3288F FALL RISK ASSESSMENT DOCD: CPT | Mod: CPTII,S$GLB,, | Performed by: PSYCHIATRY & NEUROLOGY

## 2022-04-28 PROCEDURE — 99215 OFFICE O/P EST HI 40 MIN: CPT | Mod: S$GLB,,, | Performed by: PSYCHIATRY & NEUROLOGY

## 2022-04-28 PROCEDURE — 1126F AMNT PAIN NOTED NONE PRSNT: CPT | Mod: CPTII,S$GLB,, | Performed by: PSYCHIATRY & NEUROLOGY

## 2022-04-28 RX ORDER — DONEPEZIL HYDROCHLORIDE 5 MG/1
5 TABLET, FILM COATED ORAL NIGHTLY
Qty: 30 TABLET | Refills: 11 | Status: SHIPPED | OUTPATIENT
Start: 2022-04-28 | End: 2022-07-21 | Stop reason: SINTOL

## 2022-04-28 RX ORDER — GABAPENTIN 100 MG/1
100 CAPSULE ORAL DAILY
COMMUNITY
Start: 2022-04-15 | End: 2022-07-14

## 2022-04-28 RX ORDER — CYPROHEPTADINE HYDROCHLORIDE 4 MG/1
4 TABLET ORAL 2 TIMES DAILY
COMMUNITY
Start: 2021-11-01 | End: 2022-07-21

## 2022-04-28 NOTE — ASSESSMENT & PLAN NOTE
Advancing memory decline with hallucinations. Suggested neuropsych testing. Suggested restart Aricerpt 5mg QHS.  Social work to assess home needs

## 2022-04-28 NOTE — ASSESSMENT & PLAN NOTE
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    Suggested try carbidopa/levodopa 25/100mg 1/2 tab PO TID     Suggested PT and home health nuing

## 2022-04-28 NOTE — PROGRESS NOTES
"  MOV[facility]ORD[facility]    PCP/Referring Provider: No referring provider defined for this encounter.  Date of Service: 4/28/2022    Chief Complaint: PDism    Interval Hx  Comes without pill bottles or knowledge of what meds or doses she's taking  Since last visit,  Reports she has a new blood pressure cuff and home pressures do not increase past 40's systolic    Taking Norvasc 5mg Qdaily or BID    Reports no headaches but on and off blurry vision    Uses a walker intermittently  Cane at times    Lives alone  ADLs declining  Having hallucinations during daytime daily  Taking aricept 5mg QHS  Using a cane    One fall last year     Takes carbidopa/levodopa 25/100mg 1/2 BID    Dizzy on standing at times    PD Review of Symptoms:  Anosmia: Anosmia  Dysarthria/Hypophonia: Hoarseness for 2 months that comes and goes  Dysphagia/Sialorrhea: None  Hallucinations as above- nonfrightening  Depression: none  Cognitive slowing: Memory decline since 9 months - forgets what she's saying mid-sentence - forgets items around the house- her children are now shopping for her - stopped cooking as much  Impulsivity: None  Urinary changes: frequency  Constipation: None  Orthostasis: As above  Falls: as above  Freezing: None  Micrographia: None  Sleep issues:  -ELVIA: unknown  -RBD: for 1 year   -Sleep Quality: Naps    "PriorHPI: Madeline Reilly is a R HANDED 76 y.o. female with a medical issues significant for HTN, Asthma, GERD, Orthostatic hypotension (for 12 years), who presents for 2nd opinion Re PDism per PCP. This year she noticed a resting tremor in her R hand. She also notes falls since march. She notes most of her falls are at night after standing and syncopizing or near-syncopizing. She's had 12 total falls. She feels dizzy on standing most times. She notes her BP can spike to the 190's rarely. She does not require a cane to walk. She can walk 2 blocks before she becomes scared of falling. Stairs are challenging to her.    She " "sees Dr. Abdi for BP management.    She does find herself gasping at times.  She has been taking C/L 25/100mg Po BID  No nausea    No family hx PD"    Review of Systems:   Review of Systems   Constitutional: Negative for fever.   HENT: Negative for congestion.    Eyes: Negative for double vision.   Respiratory: Negative for cough and shortness of breath.    Cardiovascular: Negative for chest pain and leg swelling.   Gastrointestinal: Negative for nausea.   Genitourinary: Negative for dysuria.   Musculoskeletal: Positive for falls.   Skin: Negative for rash.   Neurological: Positive for dizziness, tremors and speech change. Negative for headaches.   Psychiatric/Behavioral: Positive for depression and memory loss.       Neuroleptic exposure:  None    Current Medications:  Outpatient Encounter Medications as of 4/28/2022   Medication Sig Dispense Refill    amLODIPine (NORVASC) 5 MG tablet TAKE 1 TABLET BY MOUTH EVERY MORNING AND TAKE 1 ADDITIONAL TABLET IN AFTERNOON IF BLOOD PRESSURE IS GREATER THAN 180/100 30 tablet 11    atorvastatin (LIPITOR) 20 MG tablet TAKE 1 TABLET(20 MG) BY MOUTH EVERY DAY (Patient taking differently: Take 10 mg by mouth once daily. TAKE 1 TABLET(10MG) BY MOUTH EVERY DAY) 30 tablet 0    azelastine (ASTELIN) 137 mcg (0.1 %) nasal spray SPRAY TWICE INTO EACH NOSTRIL ONCE  Q DAY  4    calcium-vitamin D 500-125 mg-unit tablet Take 1 tablet by mouth every other day.       carbidopa-levodopa  mg (SINEMET)  mg per tablet Take 0.5 tablets by mouth 3 (three) times daily. 45 tablet 11    cholecalciferol, vitamin D3, (VITAMIN D3) 25 mcg (1,000 unit) capsule Take 1,000 Units by mouth.      cyproheptadine (PERIACTIN) 4 mg tablet Take 4 mg by mouth 2 (two) times daily.      diclofenac sodium (VOLTAREN) 1 % Gel Apply 2 g topically 4 (four) times daily. 200 g 2    famotidine-calcium carbonate-magnesium hydroxide (PEPCID COMPLETE) chewable tablet Take 1 tablet by mouth daily as needed.    "    fexofenadine (ALLEGRA) 60 MG tablet Take 60 mg by mouth once as needed.      loperamide (IMODIUM) 2 mg capsule TK ONE C PO  QID PRN  11    [DISCONTINUED] donepeziL (ARICEPT) 5 MG tablet Take 1 tablet (5 mg total) by mouth every evening. 30 tablet 11    ADVAIR DISKUS 500-50 mcg/dose DsDv diskus inhaler Inhale 1 puff into the lungs 2 (two) times daily as needed.   3    ALBUTEROL SULFATE (VENTOLIN INHL) Inhale into the lungs every 4 (four) hours as needed.      cephALEXin (KEFLEX) 500 MG capsule Take 500 mg by mouth 2 (two) times daily.      donepeziL (ARICEPT) 5 MG tablet Take 1 tablet (5 mg total) by mouth every evening. 30 tablet 11    gabapentin (NEURONTIN) 100 MG capsule Take 100 mg by mouth once daily.      ipratropium (ATROVENT) 0.06 % nasal spray SPRAY TWICE IN EACH NOSTRIL BID  4    omega-3s-dha-epa-fish oil 200 mg-300 mg- 100 mg-1,000 mg Cap Take by mouth.      [DISCONTINUED] fludrocortisone (FLORINEF) 0.1 mg Tab Take 100 mcg by mouth daily as needed.      [DISCONTINUED] midodrine (PROAMATINE) 2.5 MG Tab Take 1 tablets twice a day if BP less then 90 mmHg and patient is feeling dizzy 20 tablet 3     No facility-administered encounter medications on file as of 4/28/2022.       Past Medical History:  Patient Active Problem List   Diagnosis    Asthma, chronic    Orthostatic hypotension    Hypertension    HLD (hyperlipidemia)    Micturition syncope    Vasovagal syncope    Parkinsonism    Memory change    Unintentional weight loss    Spondylosis of lumbar region without myelopathy or radiculopathy    Myofascial pain syndrome    Chronic bilateral low back pain without sciatica    Impaired mobility and activities of daily living    Assistance needed with transportation    Disorder of adrenal gland, unspecified    Chronic diastolic congestive heart failure    Chronic obstructive pulmonary disease, unspecified COPD type       Past Surgical History:  Past Surgical History:   Procedure  "Laterality Date    CATARACT EXTRACTION, BILATERAL      PARTIAL HYSTERECTOMY  1981       Current Living Situation: home    Social:  Social History     Socioeconomic History    Marital status:    Tobacco Use    Smoking status: Never Smoker    Smokeless tobacco: Never Used   Substance and Sexual Activity    Alcohol use: No    Drug use: No   Social History Narrative    Retired .       Family History:  As above    PHYSICAL:  BP (!) 218/94 (BP Location: Right arm, Patient Position: Sitting)   Pulse (!) 57   Ht 5' 4" (1.626 m)   BMI 19.19 kg/m²     Physical Exam  Constitutional: Well-developed, well-nourished, appears stated age  Eyes: No scleral icterus  ENT: Moist oral mucosa  Cardiovascular: No lower extremity edema   Respiratory: No labored breathing   Skin: No rash   Hematologic: No bruising    Other: GI/ deferred   · Mental status: Alert and oriented to person, place, time, and situation;   · follows commands  · Speech: normal (not dysarthric), no aphasia  · Cranial nerves:            · CN II: Pupils mid-position and equal, not tested light or accommodation  · CN III, IV, VI: Extraocular movements full, no nystagmus visualized  · CN V: Not tested   · CN VII: Face strong and symmetric bilaterally   · CN VIII: Hearing intact to voice and conversation   · CN IX, X: Palate raises midline and symmetric   · CN XI: Strong shoulder shrug B/L  · CN XII: Tongue appears midline   · Motor: Normal bulk by appearance, no drift   · Sensory: Not tested    · Gait: Minor shuffle gets up unassisted  · Deep tendon reflexes: Not tested  · Movement/Coordination                    Mod hypophonic speech.                     Mod facial masking.  R facial dystonia    Bradykinesia  ? Finger taps   foot   Left 1+   1+   Right 2+   1+       Tremor Exam    "General Medical Examination:  General: Good hygiene, appropriate appearance.  HEENT: Normocephalic, atraumatic.   Neck: Supple.   Chest: Unlabored breathing. " "  CV: Symmetric pulses.   Ext: No clubbing, cyanosis, or edema.     Mental Status:  Mood/Affect: Appropriate/congruent.  Level of consciousness: Awake, alert.  Orientation: Oriented to person, place, time and situation.  Language: No Dysarthria    Cranial nerves:  I: Not tested  II: PERRL, VFF to counting  III, IV, VI: EOMI with conjugate gaze and no nystagmus on end gaze  V: Facial sensation intact and symmetric over the bilateral V1-V3  VII: Facial muscle activation intact and symmetric over the bilateral upper and lower face  VIII: Hearing intact in the b/l ears and symmetrical to finger rub  IX, X, XII: TUP midline - no atrophy or fasiculations  X: SCMs and shoulder shrug full strength b/l and symmetric  R facial hypomimia  Mild hypophonia    Motor:   -UE: 5/5 deltoids; 5/5 biceps, triceps; 5/5 wrist flexors, extensors; 5/5 interosseous; 5/5   -LEs: 5/5 hip flexion, extension; 5/5 knee flexion, extension; 5/5 ankle flexion, extension    DTRs:  ? Biceps Triceps Brachioradialis Knee Ankle   Left 2+ 2+ 2+ 2+ 2+   Right 2+ 2+ 2+ 2+ 2+     ? Finger taps Finger flicks MAGY Heel taps   Left 0 0 0 0   Right 1 1 0 1   Neck tone: nl  ? Arm Leg   Left 0 0   Right 1 1     Sensation:   -Light touch: Intact and symmetric in the bilateral upper and lower extremities.  -Temp: Intact and symmetric in the bilateral upper and lower extremities.  -Vibration: Decreased to knee    Coordination:   -Finger to nose: nl    Gait:  Mildly slow in all movements  -Arises from chair without use of hands.  -Casual gait is: narrow based  -Stride length: nl  -Arm Swing: mildly decreased bilaterally  -Turning: nl  -Heel walking: nl    Romberg: nl    Pull Test: nl"    Laboratory Data:  NA    Imaging:  NA    Assessment//Plan:   Problem List Items Addressed This Visit        Neuro    Parkinsonism    Current Assessment & Plan     Parkinsonism preceded by orthostasis, concerning for MSA. Concern for PD-Plus syndrome given early hallucinations and " dysautonomia.    Suggested try carbidopa/levodopa 25/100mg 1/2 tab PO TID     Suggested PT and home health nusring           Relevant Orders    Ambulatory referral/consult to Home Health    Memory change - Primary    Current Assessment & Plan     Advancing memory decline with hallucinations. Suggested neuropsych testing. Suggested restart Aricerpt 5mg QHS.  Social work to assess home needs           Relevant Orders    Ambulatory consult to Neuropsychology       Cardiac/Vascular    Hypertension    Current Assessment & Plan     Systolic sustained 200's today  Advised son and patient to head to the ER which they declined  Discussed risk of stroke heart attack and death with sustained blood pressures  She feels her home cuff reads 140's consistently  Suggested she compare these numbers for more accurate methods  Suggested she bring in all medications bottles and doses at each visit  Alerted her new cardiologist    Know to have syncope prior but she reports to florinef or midodrine use today                 Jana Vasquez MD, MS [facility]  Department of Neurology  Movement Disorders

## 2022-04-28 NOTE — ASSESSMENT & PLAN NOTE
Systolic sustained 200's today  Advised son and patient to head to the ER which they declined  Discussed risk of stroke heart attack and death with sustained blood pressures  She feels her home cuff reads 140's consistently  Suggested she compare these numbers for more accurate methods  Suggested she bring in all medications bottles and doses at each visit  Alerted her new cardiologist    Know to have syncope prior but she reports to florinef or midodrine use today

## 2022-05-02 ENCOUNTER — TELEPHONE (OUTPATIENT)
Dept: NEUROLOGY | Facility: CLINIC | Age: 77
End: 2022-05-02
Payer: MEDICARE

## 2022-05-02 NOTE — TELEPHONE ENCOUNTER
Pt called SW back. SW inquired about pt's living situation and memory issues. Pt reports the following:  Son lives in [place], daughter lives near pt in [facility]. Each visit 1x/week.  Been under some stress, having shortness of breath and trouble sleeping- may be anxiety   Starting in January - bad hallucinations of people in her house, called police about it once before   Daughter and son want her to go to NH, may already be making moves behind her back.   Pt feels that she can still Cook, clean, takes a while but feels physically well.   Tried getting a  earlier this year, felt that it was Not worth it for how much they charged.   Embarrassed to ask for help from daughter and son, daughter not super responsive to previous requests for a ride. Daughter helping with senior transit service application, needs to bring pt to office to complete.  Fell a few months ago, was in yard for 3 hours before someone helped her. Now has Life Alert and remembers to keep it on at all times.      SW suggested that pt have honest discussion about her long term care goals and wishes, including the following questions: When would she be comfortable going to a NH? How can pt safely live in her own home until then? Hire Caregiver? Have regular check ins?    Pt reported that she will try to have these conversations with her children.   SW provided number for [place]  [facility], discussed MOW.  SW will follow up in a week or so.

## 2022-05-05 ENCOUNTER — PATIENT MESSAGE (OUTPATIENT)
Dept: RESEARCH | Facility: HOSPITAL | Age: 77
End: 2022-05-05
Payer: MEDICARE

## 2022-05-09 ENCOUNTER — PATIENT MESSAGE (OUTPATIENT)
Dept: PRIMARY CARE CLINIC | Facility: CLINIC | Age: 77
End: 2022-05-09
Payer: MEDICARE

## 2022-05-11 NOTE — TELEPHONE ENCOUNTER
Ok to make a virtual or in person appointment to discuss medication options like Seroquel's risk/ benefits and/or seeing specialist.

## 2022-05-13 ENCOUNTER — PATIENT MESSAGE (OUTPATIENT)
Dept: PRIMARY CARE CLINIC | Facility: CLINIC | Age: 77
End: 2022-05-13
Payer: MEDICARE

## 2022-05-13 NOTE — TELEPHONE ENCOUNTER
ROXANA pt son (Heladio) and scheduled a virtual appt for Monday May 16, 2022 at 4:00 pm with . Pt son stated to please give him a call at [phone]

## 2022-05-16 ENCOUNTER — TELEPHONE (OUTPATIENT)
Dept: PRIMARY CARE CLINIC | Facility: CLINIC | Age: 77
End: 2022-05-16
Payer: MEDICARE

## 2022-05-16 ENCOUNTER — PATIENT MESSAGE (OUTPATIENT)
Dept: PRIMARY CARE CLINIC | Facility: CLINIC | Age: 77
End: 2022-05-16
Payer: MEDICARE

## 2022-05-16 NOTE — TELEPHONE ENCOUNTER
----- Message from Ritika Cantor sent at 5/16/2022  3:58 PM CDT -----  Contact: Heladio/gage [phone]  Patient son would like call back regarding mothers health.

## 2022-05-16 NOTE — TELEPHONE ENCOUNTER
It's ok if the relative would like to be spoken to independently at the start but at some point I will need to speak with Madeline Reilly.
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
## 2022-05-20 ENCOUNTER — OFFICE VISIT (OUTPATIENT)
Dept: PRIMARY CARE CLINIC | Facility: CLINIC | Age: 77
End: 2022-05-20
Payer: MEDICARE

## 2022-05-20 DIAGNOSIS — R44.3 HALLUCINATIONS: Primary | ICD-10-CM

## 2022-05-20 PROCEDURE — 99214 OFFICE O/P EST MOD 30 MIN: CPT | Mod: 95,,, | Performed by: FAMILY MEDICINE

## 2022-05-20 PROCEDURE — 99214 PR OFFICE/OUTPT VISIT, EST, LEVL IV, 30-39 MIN: ICD-10-PCS | Mod: 95,,, | Performed by: FAMILY MEDICINE
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
## 2022-05-23 DIAGNOSIS — I10 HYPERTENSION, UNSPECIFIED TYPE: ICD-10-CM

## 2022-05-23 NOTE — TELEPHONE ENCOUNTER
Refill Routing Note   Medication(s) are not appropriate for processing by [facility] for the following reason(s):      - Required vitals are abnormal    [facility] action(s):  Defer          Medication reconciliation completed: No     Appointments  past 12m or future 3m with PCP    Date Provider   Last Visit   5/20/2022 Margie Payne MD   Next Visit   6/20/2022 Margie Payne MD   ED visits in past 90 days: 0        Note composed:6:08 PM 05/23/2022

## 2022-05-23 NOTE — TELEPHONE ENCOUNTER
No new care gaps identified.  [facility] Embedded Care Gaps. Reference number: [id number]. 5/23/2022   6:08:32 PM CDT

## 2022-05-24 ENCOUNTER — TELEPHONE (OUTPATIENT)
Dept: PRIMARY CARE CLINIC | Facility: CLINIC | Age: 77
End: 2022-05-24
Payer: MEDICARE

## 2022-05-24 RX ORDER — AMLODIPINE BESYLATE 5 MG/1
TABLET ORAL
Qty: 90 TABLET | Refills: 3 | Status: SHIPPED | OUTPATIENT
Start: 2022-05-24 | End: 2022-07-21 | Stop reason: DRUGHIGH

## 2022-05-24 NOTE — TELEPHONE ENCOUNTER
----- Message from Margie Payne MD sent at 5/20/2022  3:59 PM CDT -----  Please email external psych referral to [email or web address]

## 2022-05-27 ENCOUNTER — TELEPHONE (OUTPATIENT)
Dept: NEUROLOGY | Facility: CLINIC | Age: 77
End: 2022-05-27
Payer: MEDICARE
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
## 2022-05-30 ENCOUNTER — TELEPHONE (OUTPATIENT)
Dept: NEUROLOGY | Facility: CLINIC | Age: 77
End: 2022-05-30
Payer: MEDICARE

## 2022-05-30 NOTE — TELEPHONE ENCOUNTER
ROXANA called for f/u conversation on long term planning, discussed the following:    Pt says her discussions with her kids have been like 'Taking a negative and changing it to a positive'.  Making decision to stop driving car entirely, only driving to [employer] [place] [employer] [place] blocks away but will stop completely.   Daughter still has not been helpful for rides.Son is more helpful, working out a schedule to help with groceries every week.  Biggest concern= relationship with her kids, doesn't want them to feel too concerned or responsible for her.  SW encouraged pt to continue open and honest discussion about needs and long term planning, letting go of guilt and shame associated with asking for help.  Pt seeing  w/ kids to help w POA soon. Pt got [employer] senior transportation.

## 2022-06-01 ENCOUNTER — OFFICE VISIT (OUTPATIENT)
Dept: CARDIOLOGY | Facility: CLINIC | Age: 77
End: 2022-06-01
Payer: MEDICARE

## 2022-06-01 VITALS
DIASTOLIC BLOOD PRESSURE: 94 MMHG | HEART RATE: 55 BPM | WEIGHT: 109.25 LBS | BODY MASS INDEX: 18.65 KG/M2 | HEIGHT: 64 IN | SYSTOLIC BLOOD PRESSURE: 152 MMHG

## 2022-06-01 DIAGNOSIS — I95.1 ORTHOSTATIC HYPOTENSION: ICD-10-CM

## 2022-06-01 DIAGNOSIS — E78.2 MIXED HYPERLIPIDEMIA: Primary | ICD-10-CM

## 2022-06-01 DIAGNOSIS — G20.A1 PARKINSON'S DISEASE: ICD-10-CM

## 2022-06-01 DIAGNOSIS — R06.02 SOB (SHORTNESS OF BREATH): ICD-10-CM

## 2022-06-01 PROCEDURE — 93000 ELECTROCARDIOGRAM COMPLETE: CPT | Mod: S$GLB,,, | Performed by: INTERNAL MEDICINE

## 2022-06-01 PROCEDURE — 1126F PR PAIN SEVERITY QUANTIFIED, NO PAIN PRESENT: ICD-10-PCS | Mod: CPTII,S$GLB,, | Performed by: INTERNAL MEDICINE

## 2022-06-01 PROCEDURE — 1101F PR PT FALLS ASSESS DOC 0-1 FALLS W/OUT INJ PAST YR: ICD-10-PCS | Mod: CPTII,S$GLB,, | Performed by: INTERNAL MEDICINE

## 2022-06-01 PROCEDURE — 3077F PR MOST RECENT SYSTOLIC BLOOD PRESSURE >= 140 MM HG: ICD-10-PCS | Mod: CPTII,S$GLB,, | Performed by: INTERNAL MEDICINE

## 2022-06-01 PROCEDURE — 1159F MED LIST DOCD IN RCRD: CPT | Mod: CPTII,S$GLB,, | Performed by: INTERNAL MEDICINE

## 2022-06-01 PROCEDURE — 3080F DIAST BP >= 90 MM HG: CPT | Mod: CPTII,S$GLB,, | Performed by: INTERNAL MEDICINE

## 2022-06-01 PROCEDURE — 1159F PR MEDICATION LIST DOCUMENTED IN MEDICAL RECORD: ICD-10-PCS | Mod: CPTII,S$GLB,, | Performed by: INTERNAL MEDICINE

## 2022-06-01 PROCEDURE — 3077F SYST BP >= 140 MM HG: CPT | Mod: CPTII,S$GLB,, | Performed by: INTERNAL MEDICINE

## 2022-06-01 PROCEDURE — 1101F PT FALLS ASSESS-DOCD LE1/YR: CPT | Mod: CPTII,S$GLB,, | Performed by: INTERNAL MEDICINE

## 2022-06-01 PROCEDURE — 3288F PR FALLS RISK ASSESSMENT DOCUMENTED: ICD-10-PCS | Mod: CPTII,S$GLB,, | Performed by: INTERNAL MEDICINE

## 2022-06-01 PROCEDURE — 99999 PR PBB SHADOW E&M-EST. PATIENT-LVL IV: CPT | Mod: PBBFAC,,, | Performed by: INTERNAL MEDICINE

## 2022-06-01 PROCEDURE — 3080F PR MOST RECENT DIASTOLIC BLOOD PRESSURE >= 90 MM HG: ICD-10-PCS | Mod: CPTII,S$GLB,, | Performed by: INTERNAL MEDICINE

## 2022-06-01 PROCEDURE — 93000 EKG 12-LEAD: ICD-10-PCS | Mod: S$GLB,,, | Performed by: INTERNAL MEDICINE

## 2022-06-01 PROCEDURE — 99204 OFFICE O/P NEW MOD 45 MIN: CPT | Mod: S$GLB,,, | Performed by: INTERNAL MEDICINE

## 2022-06-01 PROCEDURE — 99499 RISK ADDL DX/OHS AUDIT: ICD-10-PCS | Mod: S$GLB,,, | Performed by: INTERNAL MEDICINE

## 2022-06-01 PROCEDURE — 99204 PR OFFICE/OUTPT VISIT, NEW, LEVL IV, 45-59 MIN: ICD-10-PCS | Mod: S$GLB,,, | Performed by: INTERNAL MEDICINE

## 2022-06-01 PROCEDURE — 99499 UNLISTED E&M SERVICE: CPT | Mod: S$GLB,,, | Performed by: INTERNAL MEDICINE

## 2022-06-01 PROCEDURE — 1126F AMNT PAIN NOTED NONE PRSNT: CPT | Mod: CPTII,S$GLB,, | Performed by: INTERNAL MEDICINE

## 2022-06-01 PROCEDURE — 3288F FALL RISK ASSESSMENT DOCD: CPT | Mod: CPTII,S$GLB,, | Performed by: INTERNAL MEDICINE

## 2022-06-01 PROCEDURE — 99999 PR PBB SHADOW E&M-EST. PATIENT-LVL IV: ICD-10-PCS | Mod: PBBFAC,,, | Performed by: INTERNAL MEDICINE

## 2022-06-01 NOTE — PROGRESS NOTES
Cardiology    6/1/2022  2:45 PM    Problem list  Patient Active Problem List   Diagnosis    Asthma, chronic    Orthostatic hypotension    Hypertension    HLD (hyperlipidemia)    Micturition syncope    Vasovagal syncope    Parkinsonism    Memory change    Unintentional weight loss    Spondylosis of lumbar region without myelopathy or radiculopathy    Myofascial pain syndrome    Chronic bilateral low back pain without sciatica    Impaired mobility and activities of daily living    Assistance needed with transportation    Disorder of adrenal gland, unspecified    Chronic diastolic congestive heart failure    Chronic obstructive pulmonary disease, unspecified COPD type       CC:  F/u.  Patient is new to me.    HPI:  She wanted to establish with cardiology at [facility] since it's closer to home.  She saw Dr Robertson last year.  Patient has a history of orthostasis.  Her orthostasis was due to orthodromic dysfunction from a Parkinson's and also due to Parkinson medications.  She was given midodrine by Dr Robertson but she is not taking it.  She states that she has poor appetite.  She has not been taking the cyproheptadine which was given to her by her GI doctor.  She was also told by the GI doctor to take Ensure daily but she only takes it once or twice a week.    Medications  Current Outpatient Medications   Medication Sig Dispense Refill    ADVAIR DISKUS 500-50 mcg/dose DsDv diskus inhaler Inhale 1 puff into the lungs 2 (two) times daily as needed.   3    ALBUTEROL SULFATE (VENTOLIN INHL) Inhale into the lungs every 4 (four) hours as needed.      amLODIPine (NORVASC) 5 MG tablet TAKE 1 TABLET BY MOUTH EVERY MORNING AND TAKE 1 ADDITIONAL TABLET IN AFTERNOON IF BLOOD PRESSURE IS GREATER THAN 180/100 90 tablet 3    atorvastatin (LIPITOR) 20 MG tablet TAKE 1 TABLET(20 MG) BY MOUTH EVERY DAY (Patient taking differently: Take 10 mg by mouth once daily. TAKE 1 TABLET(10MG) BY MOUTH EVERY DAY) 30 tablet  0    azelastine (ASTELIN) 137 mcg (0.1 %) nasal spray SPRAY TWICE INTO EACH NOSTRIL ONCE  Q DAY  4    calcium-vitamin D 500-125 mg-unit tablet Take 1 tablet by mouth every other day.       carbidopa-levodopa  mg (SINEMET)  mg per tablet Take 0.5 tablets by mouth 3 (three) times daily. 45 tablet 11    cholecalciferol, vitamin D3, (VITAMIN D3) 25 mcg (1,000 unit) capsule Take 1,000 Units by mouth.      donepeziL (ARICEPT) 5 MG tablet Take 1 tablet (5 mg total) by mouth every evening. 30 tablet 11    famotidine-calcium carbonate-magnesium hydroxide (PEPCID COMPLETE) chewable tablet Take 1 tablet by mouth daily as needed.       fexofenadine (ALLEGRA) 60 MG tablet Take 60 mg by mouth once as needed.      ipratropium (ATROVENT) 0.06 % nasal spray SPRAY TWICE IN EACH NOSTRIL BID  4    loperamide (IMODIUM) 2 mg capsule TK ONE C PO  QID PRN  11    omega-3s-dha-epa-fish oil 200 mg-300 mg- 100 mg-1,000 mg Cap Take by mouth.      cephALEXin (KEFLEX) 500 MG capsule Take 500 mg by mouth 2 (two) times daily.      cyproheptadine (PERIACTIN) 4 mg tablet Take 4 mg by mouth 2 (two) times daily.      diclofenac sodium (VOLTAREN) 1 % Gel Apply 2 g topically 4 (four) times daily. 200 g 2    gabapentin (NEURONTIN) 100 MG capsule Take 100 mg by mouth once daily.       No current facility-administered medications for this visit.      Prior to Admission medications    Medication Sig Start Date End Date Taking? Authorizing Provider   ADVAIR DISKUS 500-50 mcg/dose DsDv diskus inhaler Inhale 1 puff into the lungs 2 (two) times daily as needed.  2/10/17  Yes Historical Provider   ALBUTEROL SULFATE (VENTOLIN INHL) Inhale into the lungs every 4 (four) hours as needed.   Yes Historical Provider   amLODIPine (NORVASC) 5 MG tablet TAKE 1 TABLET BY MOUTH EVERY MORNING AND TAKE 1 ADDITIONAL TABLET IN AFTERNOON IF BLOOD PRESSURE IS GREATER THAN 180/100 5/24/22  Yes Margie Payne MD   atorvastatin (LIPITOR) 20 MG tablet  TAKE 1 TABLET(20 MG) BY MOUTH EVERY DAY  Patient taking differently: Take 10 mg by mouth once daily. TAKE 1 TABLET(10MG) BY MOUTH EVERY DAY 12/8/19  Yes Karen Espinosa MD   azelastine (ASTELIN) 137 mcg (0.1 %) nasal spray SPRAY TWICE INTO EACH NOSTRIL ONCE  Q DAY 2/27/17  Yes Historical Provider   calcium-vitamin D 500-125 mg-unit tablet Take 1 tablet by mouth every other day.    Yes Historical Provider   carbidopa-levodopa  mg (SINEMET)  mg per tablet Take 0.5 tablets by mouth 3 (three) times daily. 10/19/21 10/19/22 Yes Jana Vasquez MD   cholecalciferol, vitamin D3, (VITAMIN D3) 25 mcg (1,000 unit) capsule Take 1,000 Units by mouth.   Yes Historical Provider   donepeziL (ARICEPT) 5 MG tablet Take 1 tablet (5 mg total) by mouth every evening. 4/28/22 4/28/23 Yes Jana Vasquez MD   famotidine-calcium carbonate-magnesium hydroxide (PEPCID COMPLETE) chewable tablet Take 1 tablet by mouth daily as needed.    Yes Historical Provider   fexofenadine (ALLEGRA) 60 MG tablet Take 60 mg by mouth once as needed.   Yes Historical Provider   ipratropium (ATROVENT) 0.06 % nasal spray SPRAY TWICE IN EACH NOSTRIL BID 2/27/17  Yes Historical Provider   loperamide (IMODIUM) 2 mg capsule TK ONE C PO  QID PRN 9/5/19  Yes Historical Provider   omega-3s-dha-epa-fish oil 200 mg-300 mg- 100 mg-1,000 mg Cap Take by mouth.   Yes Historical Provider   cephALEXin (KEFLEX) 500 MG capsule Take 500 mg by mouth 2 (two) times daily. 11/21/21   Historical Provider   cyproheptadine (PERIACTIN) 4 mg tablet Take 4 mg by mouth 2 (two) times daily. 11/1/21   Historical Provider   diclofenac sodium (VOLTAREN) 1 % Gel Apply 2 g topically 4 (four) times daily. 8/6/21   Desiree Weaver Jr., MD   gabapentin (NEURONTIN) 100 MG capsule Take 100 mg by mouth once daily. 4/15/22   Historical Provider         History  Past Medical History:   Diagnosis Date    Asthma, chronic     Orthostatic hypotension      Past Surgical History:    Procedure Laterality Date    CATARACT EXTRACTION, BILATERAL      PARTIAL HYSTERECTOMY  1981     Social History     Socioeconomic History    Marital status:    Tobacco Use    Smoking status: Never Smoker    Smokeless tobacco: Never Used   Substance and Sexual Activity    Alcohol use: No    Drug use: No   Social History Narrative    Retired .         Allergies  Review of patient's allergies indicates:   Allergen Reactions    Lisinopril Shortness Of Breath, Diarrhea and Nausea Only     All side affects from medication    Aspirin Nausea And Vomiting    Beef containing products     Pork/porcine containing products     Statins-hmg-coa reductase inhibitors Diarrhea and Nausea And Vomiting    Toprol xl [metoprolol succinate] Other (See Comments)     Low blood preasure    Hydralazine-reserpin-hcthiazid Diarrhea         Review of Systems   Review of Systems   Constitutional: Positive for decreased appetite, malaise/fatigue and weight loss.   Cardiovascular: Negative.    Respiratory: Negative.    Gastrointestinal: Negative for hematemesis, hematochezia and melena.         Physical Exam  Wt Readings from Last 1 Encounters:   06/01/22 49.6 kg (109 lb 3.8 oz)     BP Readings from Last 3 Encounters:   06/01/22 (!) 152/94   04/28/22 (!) 218/94   11/23/21 (!) 148/78     Pulse Readings from Last 1 Encounters:   06/01/22 (!) 55     Body mass index is 18.75 kg/m².    Physical Exam  Vitals reviewed.   Constitutional:       General: She is not in acute distress.     Appearance: She is underweight.   Neck:      Vascular: No JVD.   Cardiovascular:      Rate and Rhythm: Normal rate and regular rhythm.      Heart sounds: S1 normal and S2 normal.   Pulmonary:      Breath sounds: Normal breath sounds and air entry.             Assessment  1. SOB (shortness of breath)  stable  - Ambulatory referral/consult to Cardiology    2. Mixed hyperlipidemia  stable    3. Orthostatic hypotension  stable    4.  Parkinson's disease  unchanged        Plan and Discussion  Discussed with patient and daughter that given her orthostasis due to autonomic dysfunction from Parkinson's, we will allow her blood pressure run a little high so that we do not risk dropping her pressure too low and making her symptomatic.  Continue amlodipine 5 mg.  Encouraged to follow recommendation from GI doctor regarding diet, taking appetite stimulants and ensure as directed.    Follow Up  6 months      Adam Javier MD, F.A.C.C, F.S.C.A.I.

## 2022-06-03 ENCOUNTER — TELEPHONE (OUTPATIENT)
Dept: NEUROLOGY | Facility: CLINIC | Age: 77
End: 2022-06-03
Payer: MEDICARE

## 2022-06-03 NOTE — TELEPHONE ENCOUNTER
SW received call from pt, pt's son Heladio, and pt's daughter Jenn (all on speaker phone) to discuss long term care plans. SW discussed the following and sent an overview with resources via email to pt's two adult children ([email or web address]) :     Thanks for reaching out yesterday afternoon to discuss your mom's situation. Again, I'm here to help families navigate this time when their loved one, like Madeline, is losing the ability to safely live alone. My priority is making sure Madeline is safe, while respecting her wishes. This may mean harnessing the support of family and friends, and/or looking into hiring someone so that Madeline can remain living in her home.    Hiring a Caregiver  Since Madeline wants to stay in her home as long as possible, I suggested that she look into hiring a caregiver. This would be like a , as she is capable of most activities of daily life right now, but the frequency and duration of visits to her home can increase as her needs increase. Unfortunately, insurance ([employer]) only covers up to 12 visits from an agency. Contact the number on the back of the insurance card, then contact Ann Mcarthur of [facility] at [phone] for this benefit.  Once those run out, ask around to family and friends to find a caregiver. Otherwise, here are some agencies, with their time minimums and rates:    [facility]   [phone]   Minimum: $23/hr, 4hr/day min, 20hr/wk min ($460/wk)  [employer]     [facility]  [phone]  Minimum: 6 hour/weekday; 8 hour/ weekend day  $20+/hour  [employer]     [facility]  [phone]  $24/hr weekday; $28/hr weekend  Minimum: 4 hours per day  [employer]     Long Term Care Planning  Insurance covers admission to a nursing home, but the individual has to be deemed in need of 'nursing home level of care', which is defined by the following:  If a person is unable to care for themselves for a sustained period of  time and a lack of assistance would result in them being a danger to themselves. Typically,  individuals must be unable to care for themselves in more than one way, having one or more of the following issues:  Medical - such as they require assistance with catheters, IV drips, ventilators or other medical devices.  Cognitive - such as memory issues resulting from Alzheimers / dementia or an inability to process information  Behavioral - such as an inability to control their actions or moods  Functional - such as an inability to manage activities of daily living (ADLs) like dressing, toileting and eating    When your family feels it is time to begin the process of admitting Madeline to a nursing home, someone from the nursing home that y'all choose will assess Madeline in her home. Your family should alert her providers and make an appointment with her Primary care doctor for the exam required for admission. It's definitely not too early to start looking at places together.     Let me know if y'chloe would like to schedule another phone call, or email or call me (number is below) anytime. I'm here to support y'all through this process

## 2022-06-16 NOTE — TELEPHONE ENCOUNTER
----- Message from Evie Zheng sent at 3/19/2020  8:25 AM CDT -----  Contact: Jenn (daughter) @ [phone]  Pt is scheduled for a Video appt today at 11:40.  Pts daughter says she was suppose to bring a disc with imaging on it.  She would like to know how she is suppose to get the disc to Dr Vasquez.  Pls call asap.    no numbness/no tingling

## 2022-06-20 ENCOUNTER — OFFICE VISIT (OUTPATIENT)
Dept: PRIMARY CARE CLINIC | Facility: CLINIC | Age: 77
End: 2022-06-20
Payer: MEDICARE

## 2022-06-20 ENCOUNTER — OFFICE VISIT (OUTPATIENT)
Dept: PSYCHIATRY | Facility: CLINIC | Age: 77
End: 2022-06-20
Payer: MEDICARE

## 2022-06-20 VITALS
DIASTOLIC BLOOD PRESSURE: 72 MMHG | WEIGHT: 108.25 LBS | TEMPERATURE: 98 F | BODY MASS INDEX: 18.48 KG/M2 | SYSTOLIC BLOOD PRESSURE: 141 MMHG | HEIGHT: 64 IN | HEART RATE: 66 BPM | RESPIRATION RATE: 18 BRPM | OXYGEN SATURATION: 99 %

## 2022-06-20 DIAGNOSIS — M47.816 SPONDYLOSIS OF LUMBAR REGION WITHOUT MYELOPATHY OR RADICULOPATHY: ICD-10-CM

## 2022-06-20 DIAGNOSIS — J44.9 CHRONIC OBSTRUCTIVE PULMONARY DISEASE, UNSPECIFIED COPD TYPE: ICD-10-CM

## 2022-06-20 DIAGNOSIS — G20.A1 PARKINSON'S DISEASE: ICD-10-CM

## 2022-06-20 DIAGNOSIS — I10 PRIMARY HYPERTENSION: ICD-10-CM

## 2022-06-20 DIAGNOSIS — M79.18 MYOFASCIAL PAIN SYNDROME: ICD-10-CM

## 2022-06-20 DIAGNOSIS — I50.32 CHRONIC DIASTOLIC CONGESTIVE HEART FAILURE: ICD-10-CM

## 2022-06-20 DIAGNOSIS — Z78.9 IMPAIRED MOBILITY AND ACTIVITIES OF DAILY LIVING: ICD-10-CM

## 2022-06-20 DIAGNOSIS — Z74.09 IMPAIRED MOBILITY AND ACTIVITIES OF DAILY LIVING: ICD-10-CM

## 2022-06-20 DIAGNOSIS — E78.2 MIXED HYPERLIPIDEMIA: ICD-10-CM

## 2022-06-20 DIAGNOSIS — E27.9 DISORDER OF ADRENAL GLAND, UNSPECIFIED: ICD-10-CM

## 2022-06-20 DIAGNOSIS — F41.9 ANXIETY: ICD-10-CM

## 2022-06-20 DIAGNOSIS — R44.3 HALLUCINATIONS: Primary | ICD-10-CM

## 2022-06-20 DIAGNOSIS — I95.1 ORTHOSTATIC HYPOTENSION: ICD-10-CM

## 2022-06-20 DIAGNOSIS — R41.3 MEMORY CHANGE: ICD-10-CM

## 2022-06-20 PROCEDURE — 90791 PSYCH DIAGNOSTIC EVALUATION: CPT | Mod: S$GLB,,, | Performed by: SOCIAL WORKER

## 2022-06-20 PROCEDURE — 99215 PR OFFICE/OUTPT VISIT, EST, LEVL V, 40-54 MIN: ICD-10-PCS | Mod: S$PBB,,, | Performed by: FAMILY MEDICINE

## 2022-06-20 PROCEDURE — 90791 PR PSYCHIATRIC DIAGNOSTIC EVALUATION: ICD-10-PCS | Mod: S$GLB,,, | Performed by: SOCIAL WORKER

## 2022-06-20 PROCEDURE — 99999 PR PBB SHADOW E&M-EST. PATIENT-LVL V: CPT | Mod: PBBFAC,,, | Performed by: FAMILY MEDICINE

## 2022-06-20 PROCEDURE — 99499 RISK ADDL DX/OHS AUDIT: ICD-10-PCS | Mod: S$GLB,,, | Performed by: FAMILY MEDICINE

## 2022-06-20 PROCEDURE — 99215 OFFICE O/P EST HI 40 MIN: CPT | Mod: S$PBB,,, | Performed by: FAMILY MEDICINE

## 2022-06-20 PROCEDURE — 99999 PR PBB SHADOW E&M-EST. PATIENT-LVL V: ICD-10-PCS | Mod: PBBFAC,,, | Performed by: FAMILY MEDICINE

## 2022-06-20 PROCEDURE — 99999 PR PBB SHADOW E&M-EST. PATIENT-LVL II: ICD-10-PCS | Mod: PBBFAC,,, | Performed by: SOCIAL WORKER

## 2022-06-20 PROCEDURE — 99499 UNLISTED E&M SERVICE: CPT | Mod: S$GLB,,, | Performed by: FAMILY MEDICINE

## 2022-06-20 PROCEDURE — 99999 PR PBB SHADOW E&M-EST. PATIENT-LVL II: CPT | Mod: PBBFAC,,, | Performed by: SOCIAL WORKER

## 2022-06-20 RX ORDER — CYCLOBENZAPRINE HCL 10 MG
10 TABLET ORAL NIGHTLY
Qty: 10 TABLET | Refills: 11 | Status: SHIPPED | OUTPATIENT
Start: 2022-06-20 | End: 2022-06-30

## 2022-06-20 RX ORDER — QUETIAPINE FUMARATE 25 MG/1
25 TABLET, FILM COATED ORAL NIGHTLY
Qty: 30 TABLET | Refills: 11 | Status: SHIPPED | OUTPATIENT
Start: 2022-06-20 | End: 2022-08-25

## 2022-06-20 NOTE — PROGRESS NOTES
Psychiatry Initial Visit (PhD/LCSW)  Diagnostic Interview - CPT 27699    Date: 6/20/2022    Site: [facility]    Referral source: Dr. Armstrong    Clinical status of patient: Outpatient    Madeline Reilly, a 76 y.o. female, for initial evaluation visit.  Met with patient and son.    Chief complaint/reason for encounter: hallucinations    History of present illness: Pt seen with son Srinivas Vazquez.  She was unable to walk all the way to my office and had to taken the remainder of the way in a wheelchair.  She has Parkinson's which was diagnosed about a year ago but son says she has had decades of health issues.  Son reports that she has told him for years about vivid dreams where she see people from her past.  However, recently she has hallucinated her mother and her ex- coming out of her TV and just standing there in her house.  Both times she has called the police.  She also heard noises one time and thought there was a party going on in her house.  She lives alone and says she is able to take care of herself for the most part.  She often sleeps all day due to being tired.  When she is awake, she reads and does puzzles. Son says they are here due to her hallucinations.  He and his sister also have concerns about her living alone but she does not want to live anywhere but in her home.      Pain: noncontributory    Symptoms:   · Mood: depressed mood, hypersomnia and social isolation  · Anxiety: excessive anxiety/worry  · Substance abuse: denied  · Cognitive functioning: denied  · Health behaviors: noncontributory    Psychiatric history: none    Medical history: Parkinson's Disease    Family history of psychiatric illness: not known    Social history (marriage, employment, etc.): Pt states she was  3 times.  She has 2 adult children from her first marriage and 4 granddaughters.  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  She has lived alone for the past 12 years and  owns her home in [place].    Substance use:   Alcohol: none   Drugs: none   Tobacco: none   Caffeine: none    Current medications and drug reactions (include OTC, herbal): see medication list, see chart    Strengths and liabilities: Strength: Patient is intelligent., Strength: Patient has positive support network., Liability: Patient is dependent., Liability: Patient has poor health., Liability: Patient has possible cognitive impairment., Liability: Patient lacks coping skills.    Current Evaluation:     Mental Status Exam:  General Appearance:  neatly groomed, thin & gaunt looking   Speech: slowed, soft      Level of Cooperation: cooperative      Thought Processes: logical   Mood: steady      Thought Content: normal, no suicidality, no homicidality, delusions, or paranoia   Affect: decreased range   Orientation: Oriented x3   Memory: did not assess   Attention Span & Concentration: intact   Fund of General Knowledge: intact and appropriate to age and level of education   Abstract Reasoning: did not assess   Judgment & Insight: limited     Language  intact     Diagnostic Impression - Plan:       ICD-10-CM ICD-9-CM   1. Anxiety  F41.9 300.00       Plan:consult psychiatrist for medication evaluation , suggested son look into elderly day care options, [place] on aging, find someone to help her out in the home several days a week.    Return to Clinic: as scheduled    Length of Service (minutes): 45

## 2022-06-20 NOTE — PROGRESS NOTES
Subjective:       Patient ID: Madeline Reilly is a 76 y.o. female.    Chief Complaint: Follow up      77 yo female accompanied by son.    She has a past medical history of parkinsonism, memory change, visual hallucinations, asthma, orthostatic hypertension, disorder of adrenal gland, diastolic heart failure, COPD; hypertension, hyperlipidemia, unintentional weight loss with unremarkable work up with CT/ reported negative EGD& colonoscopy by GI, basal vagal syncope, micturition syncope; chronic back pain, spondylosis of lumbar region, myofascial pain; impaired mobility ambulating with cane and needs assistance with transport on RTA.    Since last visit she has seen Neurology, Cardiology and / therapist.    She has not seen a psychiatrist for medication management of visual hallucinations. She  Understands that hallucinations are not real but are problematic. She reports chronic headaches with intermittent neck pain and back pain. No new onset speech deficit, weakness or paralysis, no early morning vomiting or nausea. She reports no recent falls.    The following portions of the patient's history were reviewed and updated as appropriate: allergies, current medications, past family history, past medical history, past social history, past surgical history and problem list.     Review of Systems   Constitutional: Positive for activity change, fatigue and unexpected weight change. Negative for appetite change, chills, diaphoresis and fever.   Respiratory: Positive for shortness of breath. Negative for apnea, cough, choking, chest tightness, wheezing and stridor.    Cardiovascular: Negative for chest pain, palpitations, leg swelling and claudication.   Gastrointestinal: Negative for blood in stool, change in bowel habit, nausea, vomiting and change in bowel habit.   Musculoskeletal: Positive for myalgias and back pain.   Neurological: Positive for headaches and memory loss. Negative for dizziness.  "  Psychiatric/Behavioral: Positive for dysphoric mood, memory issues, hallucinations.          Objective:       Vitals:    06/20/22 1437   BP: (!) 141/72   BP Location: Right arm   Patient Position: Sitting   BP Method: Medium (Manual)   Pulse: 66   Resp: 18   Temp: 97.8 °F (36.6 °C)   SpO2: 99%   Weight: 49.1 kg (108 lb 3.9 oz)   Height: 5' 4" (1.626 m)     Physical Exam  Constitutional:       General: She is not in acute distress.     Appearance: She is not ill-appearing, toxic-appearing or diaphoretic.   HENT:      Head: Atraumatic.   Eyes:      Conjunctiva/sclera: Conjunctivae normal.   Neck:      Musculoskeletal: Neck supple.      Vascular: No carotid bruit.   Cardiovascular:      Rate and Rhythm: Normal rate and regular rhythm.      Pulses: Normal pulses.      Heart sounds: Normal heart sounds.   Pulmonary:      Effort: Pulmonary effort is normal.      Breath sounds: Normal breath sounds.   Abdominal:      General: Bowel sounds are normal.      Palpations: Abdomen is soft. There is no mass.      Tenderness: There is no abdominal tenderness. There is no guarding or rebound.   Musculoskeletal:  Musculoskeletal:  Antalgic gait & unsteady gait and station.  No misalignment, paraverterbral tenderness presnet, no masses, no effusions, no decreased range of motion, no instability, no atrophy or abnormal strength or tone in the head, neck, spine, ribs, pelvis or extremities.  Neurological:      Mental Status: She is alert and oriented to person, place, and time.   Psychiatric:         Thought Content: Thought content normal.      Comments: Flat affect         Assessment:       1. Hallucinations    2. Memory change    3. Parkinson's disease    4. Impaired mobility and activities of daily living    5. Primary hypertension    6. Orthostatic hypotension    7. Chronic diastolic congestive heart failure    8. Mixed hyperlipidemia    9. Disorder of adrenal gland, unspecified    10. Chronic obstructive pulmonary disease, " unspecified COPD type    11. Myofascial pain syndrome    12. Spondylosis of lumbar region without myelopathy or radiculopathy        Plan:       1. Hallucinations  -     Ambulatory referral/consult to Psychiatry; Future; Expected date: 06/27/2022  -     QUEtiapine (SEROQUEL) 25 MG Tab; Take 1 tablet (25 mg total) by mouth every evening.  Dispense: 30 tablet; Refill: 11  Has done talk therapy     2. Memory change  3, Parkinson's disease  Follows with neurology    4. Impaired mobility and activities of daily living  Ambulates with cane. Fall precautions advised.    5. Primary hypertension  -     CBC Auto Differential; Future  -     Comprehensive Metabolic Panel; Future  -     Microalbumin/Creatinine Ratio, Urine; Future  -     TSH; Future  No changes to antihypertensive regimen    6.Orthostatic hypotension  Labile readings like due to autonomic dysfunction. Follow with cardiology.    7. Chronic diastolic congestive heart failure  No signs or symptoms of acute heart failure. Continue BP medication and statin.    8. Mixed hyperlipidemia  -     Lipid Panel; Future  On atorvastatin    9. Disorder of adrenal gland, unspecified  Patient with vision hallucinations, check labs    10. Chronic obstructive pulmonary disease, unspecified COPD type  Continue inhaler therapy.  Currently asymptomatic    11, Myofascial pain syndrome  12. Spondylosis of lumbar region without myelopathy or radiculopathy  Follow with pain management  -     cyclobenzaprine (FLEXERIL) 10 MG tablet; Take 1 tablet (10 mg total) by mouth every evening. May repeat course as needed. for 10 days  Dispense: 10 tablet; Refill: 11  There may be a component of cervicogenic headaches.    This was a 40 minute visit, 30 mins of which were spent counseling and coordinating care.    Disclaimer: This note has been generated using voice-recognition software. There may be typographical errors that have been missed during proof-reading

## 2022-06-22 ENCOUNTER — LAB VISIT (OUTPATIENT)
Dept: LAB | Facility: HOSPITAL | Age: 77
End: 2022-06-22
Attending: FAMILY MEDICINE
Payer: MEDICARE

## 2022-06-22 DIAGNOSIS — I10 PRIMARY HYPERTENSION: ICD-10-CM

## 2022-06-22 DIAGNOSIS — E78.2 MIXED HYPERLIPIDEMIA: ICD-10-CM

## 2022-06-22 LAB
ALBUMIN SERPL BCP-MCNC: 4.3 G/DL (ref 3.5–5.2)
ALP SERPL-CCNC: 38 U/L (ref 55–135)
ALT SERPL W/O P-5'-P-CCNC: 9 U/L (ref 10–44)
ANION GAP SERPL CALC-SCNC: 9 MMOL/L (ref 8–16)
AST SERPL-CCNC: 14 U/L (ref 10–40)
BASOPHILS # BLD AUTO: 0.06 K/UL (ref 0–0.2)
BASOPHILS NFR BLD: 1.1 % (ref 0–1.9)
BILIRUB SERPL-MCNC: 0.7 MG/DL (ref 0.1–1)
BUN SERPL-MCNC: 45 MG/DL (ref 8–23)
CALCIUM SERPL-MCNC: 9.6 MG/DL (ref 8.7–10.5)
CHLORIDE SERPL-SCNC: 109 MMOL/L (ref 95–110)
CHOLEST SERPL-MCNC: 236 MG/DL (ref 120–199)
CHOLEST/HDLC SERPL: 3.2 {RATIO} (ref 2–5)
CO2 SERPL-SCNC: 24 MMOL/L (ref 23–29)
CREAT SERPL-MCNC: 1.7 MG/DL (ref 0.5–1.4)
DIFFERENTIAL METHOD: ABNORMAL
EOSINOPHIL # BLD AUTO: 0.1 K/UL (ref 0–0.5)
EOSINOPHIL NFR BLD: 0.9 % (ref 0–8)
ERYTHROCYTE [DISTWIDTH] IN BLOOD BY AUTOMATED COUNT: 14.2 % (ref 11.5–14.5)
EST. GFR  (AFRICAN AMERICAN): 32.8 ML/MIN/1.73 M^2
EST. GFR  (NON AFRICAN AMERICAN): 28.5 ML/MIN/1.73 M^2
GLUCOSE SERPL-MCNC: 78 MG/DL (ref 70–110)
HCT VFR BLD AUTO: 36.1 % (ref 37–48.5)
HDLC SERPL-MCNC: 73 MG/DL (ref 40–75)
HDLC SERPL: 30.9 % (ref 20–50)
HGB BLD-MCNC: 11.6 G/DL (ref 12–16)
IMM GRANULOCYTES # BLD AUTO: 0.01 K/UL (ref 0–0.04)
IMM GRANULOCYTES NFR BLD AUTO: 0.2 % (ref 0–0.5)
LDLC SERPL CALC-MCNC: 151.2 MG/DL (ref 63–159)
LYMPHOCYTES # BLD AUTO: 1.8 K/UL (ref 1–4.8)
LYMPHOCYTES NFR BLD: 32.4 % (ref 18–48)
MCH RBC QN AUTO: 30.4 PG (ref 27–31)
MCHC RBC AUTO-ENTMCNC: 32.1 G/DL (ref 32–36)
MCV RBC AUTO: 95 FL (ref 82–98)
MONOCYTES # BLD AUTO: 0.6 K/UL (ref 0.3–1)
MONOCYTES NFR BLD: 9.9 % (ref 4–15)
NEUTROPHILS # BLD AUTO: 3.1 K/UL (ref 1.8–7.7)
NEUTROPHILS NFR BLD: 55.5 % (ref 38–73)
NONHDLC SERPL-MCNC: 163 MG/DL
NRBC BLD-RTO: 0 /100 WBC
PLATELET # BLD AUTO: 199 K/UL (ref 150–450)
PMV BLD AUTO: 10.6 FL (ref 9.2–12.9)
POTASSIUM SERPL-SCNC: 4.4 MMOL/L (ref 3.5–5.1)
PROT SERPL-MCNC: 7.7 G/DL (ref 6–8.4)
RBC # BLD AUTO: 3.81 M/UL (ref 4–5.4)
SODIUM SERPL-SCNC: 142 MMOL/L (ref 136–145)
TRIGL SERPL-MCNC: 59 MG/DL (ref 30–150)
TSH SERPL DL<=0.005 MIU/L-ACNC: 2.5 UIU/ML (ref 0.4–4)
WBC # BLD AUTO: 5.64 K/UL (ref 3.9–12.7)

## 2022-06-22 PROCEDURE — 85025 COMPLETE CBC W/AUTO DIFF WBC: CPT | Performed by: FAMILY MEDICINE

## 2022-06-22 PROCEDURE — 84443 ASSAY THYROID STIM HORMONE: CPT | Performed by: FAMILY MEDICINE

## 2022-06-22 PROCEDURE — 80053 COMPREHEN METABOLIC PANEL: CPT | Performed by: FAMILY MEDICINE

## 2022-06-22 PROCEDURE — 36415 COLL VENOUS BLD VENIPUNCTURE: CPT | Mod: PN | Performed by: FAMILY MEDICINE

## 2022-06-22 PROCEDURE — 80061 LIPID PANEL: CPT | Performed by: FAMILY MEDICINE

## 2022-06-23 ENCOUNTER — TELEPHONE (OUTPATIENT)
Dept: PRIMARY CARE CLINIC | Facility: CLINIC | Age: 77
End: 2022-06-23
Payer: MEDICARE

## 2022-06-23 ENCOUNTER — PATIENT MESSAGE (OUTPATIENT)
Dept: PRIMARY CARE CLINIC | Facility: CLINIC | Age: 77
End: 2022-06-23
Payer: MEDICARE

## 2022-06-23 DIAGNOSIS — N18.32 STAGE 3B CHRONIC KIDNEY DISEASE: Primary | ICD-10-CM

## 2022-06-23 NOTE — TELEPHONE ENCOUNTER
----- Message from Margie Payne MD sent at 6/23/2022  9:38 AM CDT -----  Hello,    Normal thyroid function    You were dehydrated as evidenced by lab and kidney function low. Encourage adequate hydration and avoid non steroidal antiinflammatories including ibuprofen. I want you to see Nephrology.    Normal liver function test.    Elevated total cholesterol. Continue atorvastatin. Encourage low cholesterol foods.    Mild anemia does not require transfusion or acute intervention.

## 2022-06-23 NOTE — TELEPHONE ENCOUNTER
----- Message from Margie Payne MD sent at 6/23/2022  9:05 AM CDT -----  Hello,    You have protein in the urine and kidney functions are overall low. Avoid nonsteroidal antiinflammatories including ibuprofen. I want you to see Nephrology, kidney specialist.

## 2022-07-14 RX ORDER — GABAPENTIN 100 MG/1
CAPSULE ORAL
Qty: 90 CAPSULE | Refills: 0 | Status: SHIPPED | OUTPATIENT
Start: 2022-07-14 | End: 2022-08-25

## 2022-07-18 ENCOUNTER — OFFICE VISIT (OUTPATIENT)
Dept: OPTOMETRY | Facility: CLINIC | Age: 77
End: 2022-07-18
Payer: MEDICARE

## 2022-07-18 DIAGNOSIS — H52.4 HYPEROPIA WITH ASTIGMATISM AND PRESBYOPIA, BILATERAL: ICD-10-CM

## 2022-07-18 DIAGNOSIS — H04.123 DRY EYE SYNDROME OF BOTH EYES: Primary | ICD-10-CM

## 2022-07-18 DIAGNOSIS — Z96.1 PSEUDOPHAKIA: ICD-10-CM

## 2022-07-18 DIAGNOSIS — H52.03 HYPEROPIA WITH ASTIGMATISM AND PRESBYOPIA, BILATERAL: ICD-10-CM

## 2022-07-18 DIAGNOSIS — H35.031 HYPERTENSIVE RETINOPATHY OF RIGHT EYE: ICD-10-CM

## 2022-07-18 DIAGNOSIS — H52.203 HYPEROPIA WITH ASTIGMATISM AND PRESBYOPIA, BILATERAL: ICD-10-CM

## 2022-07-18 PROCEDURE — 1159F MED LIST DOCD IN RCRD: CPT | Mod: CPTII,S$GLB,, | Performed by: OPTOMETRIST

## 2022-07-18 PROCEDURE — 92015 PR REFRACTION: ICD-10-PCS | Mod: S$GLB,,, | Performed by: OPTOMETRIST

## 2022-07-18 PROCEDURE — 1159F PR MEDICATION LIST DOCUMENTED IN MEDICAL RECORD: ICD-10-PCS | Mod: CPTII,S$GLB,, | Performed by: OPTOMETRIST

## 2022-07-18 PROCEDURE — 99999 PR PBB SHADOW E&M-EST. PATIENT-LVL III: ICD-10-PCS | Mod: PBBFAC,,, | Performed by: OPTOMETRIST

## 2022-07-18 PROCEDURE — 92015 DETERMINE REFRACTIVE STATE: CPT | Mod: S$GLB,,, | Performed by: OPTOMETRIST

## 2022-07-18 PROCEDURE — 1101F PR PT FALLS ASSESS DOC 0-1 FALLS W/OUT INJ PAST YR: ICD-10-PCS | Mod: CPTII,S$GLB,, | Performed by: OPTOMETRIST

## 2022-07-18 PROCEDURE — 1126F AMNT PAIN NOTED NONE PRSNT: CPT | Mod: CPTII,S$GLB,, | Performed by: OPTOMETRIST

## 2022-07-18 PROCEDURE — 92004 PR EYE EXAM, NEW PATIENT,COMPREHESV: ICD-10-PCS | Mod: S$GLB,,, | Performed by: OPTOMETRIST

## 2022-07-18 PROCEDURE — 1126F PR PAIN SEVERITY QUANTIFIED, NO PAIN PRESENT: ICD-10-PCS | Mod: CPTII,S$GLB,, | Performed by: OPTOMETRIST

## 2022-07-18 PROCEDURE — 1101F PT FALLS ASSESS-DOCD LE1/YR: CPT | Mod: CPTII,S$GLB,, | Performed by: OPTOMETRIST

## 2022-07-18 PROCEDURE — 92004 COMPRE OPH EXAM NEW PT 1/>: CPT | Mod: S$GLB,,, | Performed by: OPTOMETRIST

## 2022-07-18 PROCEDURE — 99999 PR PBB SHADOW E&M-EST. PATIENT-LVL III: CPT | Mod: PBBFAC,,, | Performed by: OPTOMETRIST

## 2022-07-18 PROCEDURE — 3288F FALL RISK ASSESSMENT DOCD: CPT | Mod: CPTII,S$GLB,, | Performed by: OPTOMETRIST

## 2022-07-18 PROCEDURE — 1160F PR REVIEW ALL MEDS BY PRESCRIBER/CLIN PHARMACIST DOCUMENTED: ICD-10-PCS | Mod: CPTII,S$GLB,, | Performed by: OPTOMETRIST

## 2022-07-18 PROCEDURE — 3288F PR FALLS RISK ASSESSMENT DOCUMENTED: ICD-10-PCS | Mod: CPTII,S$GLB,, | Performed by: OPTOMETRIST

## 2022-07-18 PROCEDURE — 1160F RVW MEDS BY RX/DR IN RCRD: CPT | Mod: CPTII,S$GLB,, | Performed by: OPTOMETRIST

## 2022-07-18 NOTE — PROGRESS NOTES
EVANGELINA SPAULDING: about 1 yr. Ago elsewhere  Chief complaint (CC): Patient is here for annual eye exam today.  Patient   has noticed that distnce and near have gradually gotten worse over the   past year.   Wears OTC readers but they don't seem to be working as well   as they used to.  Glasses? OTC +2.50  Contacts? -  H/o eye surgery, injections or laser: PC IOL OU  H/o eye injury: -  Known eye conditions? -  Family h/o eye conditions? -  Eye gtts? -      (-) Flashes (-)  Floaters (-) Mucous   (+)  Tearing (-) Itching (+) Burning   (-) Headaches (-) Eye Pain/discomfort (+) Irritation   (-)  Redness (-) Double vision (-) Blurry vision    Diabetic? -  A1c? -        Last edited by Nicole Lerner on 7/18/2022  3:54 PM. (History)            Assessment /Plan     For exam results, see Encounter Report.    Dry eye syndrome of both eyes    Pseudophakia    Hypertensive retinopathy of right eye    Hyperopia with astigmatism and presbyopia, bilateral      1. Recommend Systane Ultra or Refresh Optive TID-QID OU to aid with symptoms of dry eyes.  2. Good result.   3. Pt reports spikes in BP expanding over years. Collaterals noted OD so possible H/o vascular event in eye.   4. SRx released to patient. Patient educated on lens options. Normal ocular health. RTC 1 year for routine exam.                     Minoxidil Counseling: Minoxidil is a topical medication which can increase blood flow where it is applied. It is uncertain how this medication increases hair growth. Side effects are uncommon and include stinging and allergic reactions.

## 2022-07-21 ENCOUNTER — OFFICE VISIT (OUTPATIENT)
Dept: PRIMARY CARE CLINIC | Facility: CLINIC | Age: 77
End: 2022-07-21
Payer: MEDICARE

## 2022-07-21 ENCOUNTER — LAB VISIT (OUTPATIENT)
Dept: LAB | Facility: HOSPITAL | Age: 77
End: 2022-07-21
Attending: INTERNAL MEDICINE
Payer: MEDICARE

## 2022-07-21 VITALS
BODY MASS INDEX: 18.44 KG/M2 | OXYGEN SATURATION: 96 % | DIASTOLIC BLOOD PRESSURE: 100 MMHG | HEART RATE: 67 BPM | WEIGHT: 108 LBS | HEIGHT: 64 IN | TEMPERATURE: 98 F | SYSTOLIC BLOOD PRESSURE: 210 MMHG

## 2022-07-21 DIAGNOSIS — I10 SEVERE UNCONTROLLED HYPERTENSION: Primary | ICD-10-CM

## 2022-07-21 DIAGNOSIS — E78.5 HYPERLIPIDEMIA, UNSPECIFIED HYPERLIPIDEMIA TYPE: ICD-10-CM

## 2022-07-21 DIAGNOSIS — G20.A1 PARKINSON'S DISEASE: ICD-10-CM

## 2022-07-21 DIAGNOSIS — D64.9 NORMOCYTIC ANEMIA: ICD-10-CM

## 2022-07-21 DIAGNOSIS — N17.9 ACUTE KIDNEY INJURY: ICD-10-CM

## 2022-07-21 DIAGNOSIS — Z23 NEED FOR PNEUMOCOCCAL VACCINE: ICD-10-CM

## 2022-07-21 DIAGNOSIS — I50.32 CHRONIC DIASTOLIC CONGESTIVE HEART FAILURE: ICD-10-CM

## 2022-07-21 LAB
ERYTHROCYTE [DISTWIDTH] IN BLOOD BY AUTOMATED COUNT: 14.3 % (ref 11.5–14.5)
HCT VFR BLD AUTO: 38.9 % (ref 37–48.5)
HGB BLD-MCNC: 12.1 G/DL (ref 12–16)
MCH RBC QN AUTO: 30 PG (ref 27–31)
MCHC RBC AUTO-ENTMCNC: 31.1 G/DL (ref 32–36)
MCV RBC AUTO: 97 FL (ref 82–98)
PLATELET # BLD AUTO: 221 K/UL (ref 150–450)
PMV BLD AUTO: 11.6 FL (ref 9.2–12.9)
RBC # BLD AUTO: 4.03 M/UL (ref 4–5.4)
WBC # BLD AUTO: 5.73 K/UL (ref 3.9–12.7)

## 2022-07-21 PROCEDURE — 80069 RENAL FUNCTION PANEL: CPT | Performed by: INTERNAL MEDICINE

## 2022-07-21 PROCEDURE — 1126F PR PAIN SEVERITY QUANTIFIED, NO PAIN PRESENT: ICD-10-PCS | Mod: CPTII,S$GLB,, | Performed by: INTERNAL MEDICINE

## 2022-07-21 PROCEDURE — 3080F DIAST BP >= 90 MM HG: CPT | Mod: CPTII,S$GLB,, | Performed by: INTERNAL MEDICINE

## 2022-07-21 PROCEDURE — 1126F AMNT PAIN NOTED NONE PRSNT: CPT | Mod: CPTII,S$GLB,, | Performed by: INTERNAL MEDICINE

## 2022-07-21 PROCEDURE — 99214 OFFICE O/P EST MOD 30 MIN: CPT | Mod: S$GLB,,, | Performed by: INTERNAL MEDICINE

## 2022-07-21 PROCEDURE — 85027 COMPLETE CBC AUTOMATED: CPT | Performed by: INTERNAL MEDICINE

## 2022-07-21 PROCEDURE — 1159F PR MEDICATION LIST DOCUMENTED IN MEDICAL RECORD: ICD-10-PCS | Mod: CPTII,S$GLB,, | Performed by: INTERNAL MEDICINE

## 2022-07-21 PROCEDURE — 3080F PR MOST RECENT DIASTOLIC BLOOD PRESSURE >= 90 MM HG: ICD-10-PCS | Mod: CPTII,S$GLB,, | Performed by: INTERNAL MEDICINE

## 2022-07-21 PROCEDURE — 1101F PT FALLS ASSESS-DOCD LE1/YR: CPT | Mod: CPTII,S$GLB,, | Performed by: INTERNAL MEDICINE

## 2022-07-21 PROCEDURE — 1160F PR REVIEW ALL MEDS BY PRESCRIBER/CLIN PHARMACIST DOCUMENTED: ICD-10-PCS | Mod: CPTII,S$GLB,, | Performed by: INTERNAL MEDICINE

## 2022-07-21 PROCEDURE — 1101F PR PT FALLS ASSESS DOC 0-1 FALLS W/OUT INJ PAST YR: ICD-10-PCS | Mod: CPTII,S$GLB,, | Performed by: INTERNAL MEDICINE

## 2022-07-21 PROCEDURE — 3077F PR MOST RECENT SYSTOLIC BLOOD PRESSURE >= 140 MM HG: ICD-10-PCS | Mod: CPTII,S$GLB,, | Performed by: INTERNAL MEDICINE

## 2022-07-21 PROCEDURE — 99499 UNLISTED E&M SERVICE: CPT | Mod: S$GLB,,, | Performed by: INTERNAL MEDICINE

## 2022-07-21 PROCEDURE — 99214 PR OFFICE/OUTPT VISIT, EST, LEVL IV, 30-39 MIN: ICD-10-PCS | Mod: S$GLB,,, | Performed by: INTERNAL MEDICINE

## 2022-07-21 PROCEDURE — 3077F SYST BP >= 140 MM HG: CPT | Mod: CPTII,S$GLB,, | Performed by: INTERNAL MEDICINE

## 2022-07-21 PROCEDURE — 84466 ASSAY OF TRANSFERRIN: CPT | Performed by: INTERNAL MEDICINE

## 2022-07-21 PROCEDURE — 99999 PR PBB SHADOW E&M-EST. PATIENT-LVL V: CPT | Mod: PBBFAC,,, | Performed by: INTERNAL MEDICINE

## 2022-07-21 PROCEDURE — 99999 PR PBB SHADOW E&M-EST. PATIENT-LVL V: ICD-10-PCS | Mod: PBBFAC,,, | Performed by: INTERNAL MEDICINE

## 2022-07-21 PROCEDURE — 1160F RVW MEDS BY RX/DR IN RCRD: CPT | Mod: CPTII,S$GLB,, | Performed by: INTERNAL MEDICINE

## 2022-07-21 PROCEDURE — 36415 COLL VENOUS BLD VENIPUNCTURE: CPT | Mod: PN | Performed by: INTERNAL MEDICINE

## 2022-07-21 PROCEDURE — 1159F MED LIST DOCD IN RCRD: CPT | Mod: CPTII,S$GLB,, | Performed by: INTERNAL MEDICINE

## 2022-07-21 PROCEDURE — 99499 RISK ADDL DX/OHS AUDIT: ICD-10-PCS | Mod: S$GLB,,, | Performed by: INTERNAL MEDICINE

## 2022-07-21 PROCEDURE — 3288F PR FALLS RISK ASSESSMENT DOCUMENTED: ICD-10-PCS | Mod: CPTII,S$GLB,, | Performed by: INTERNAL MEDICINE

## 2022-07-21 PROCEDURE — 3288F FALL RISK ASSESSMENT DOCD: CPT | Mod: CPTII,S$GLB,, | Performed by: INTERNAL MEDICINE

## 2022-07-21 RX ORDER — AMLODIPINE BESYLATE 10 MG/1
10 TABLET ORAL DAILY
Qty: 90 TABLET | Refills: 1 | Status: SHIPPED | OUTPATIENT
Start: 2022-07-21 | End: 2022-09-14

## 2022-07-22 LAB
ALBUMIN SERPL BCP-MCNC: 4.5 G/DL (ref 3.5–5.2)
ANION GAP SERPL CALC-SCNC: 10 MMOL/L (ref 8–16)
BUN SERPL-MCNC: 36 MG/DL (ref 8–23)
CALCIUM SERPL-MCNC: 10.3 MG/DL (ref 8.7–10.5)
CHLORIDE SERPL-SCNC: 104 MMOL/L (ref 95–110)
CO2 SERPL-SCNC: 26 MMOL/L (ref 23–29)
CREAT SERPL-MCNC: 1.6 MG/DL (ref 0.5–1.4)
EST. GFR  (AFRICAN AMERICAN): 35.3 ML/MIN/1.73 M^2
EST. GFR  (NON AFRICAN AMERICAN): 30.6 ML/MIN/1.73 M^2
GLUCOSE SERPL-MCNC: 78 MG/DL (ref 70–110)
IRON SERPL-MCNC: 72 UG/DL (ref 30–160)
PHOSPHATE SERPL-MCNC: 3.7 MG/DL (ref 2.7–4.5)
POTASSIUM SERPL-SCNC: 4.7 MMOL/L (ref 3.5–5.1)
SATURATED IRON: 19 % (ref 20–50)
SODIUM SERPL-SCNC: 140 MMOL/L (ref 136–145)
TOTAL IRON BINDING CAPACITY: 371 UG/DL (ref 250–450)
TRANSFERRIN SERPL-MCNC: 251 MG/DL (ref 200–375)

## 2022-07-24 NOTE — PROGRESS NOTES
Subjective:       Patient ID: Madeline Reilly is a 76 y.o. female.    Chief Complaint: Follow-up and Establish Care    Last seen by previous PCP here one month ago, that doctor has left the practice. Presents to establish care, accompanied by her son Srinivas, voicing no complaints. Unclear compliance with medications, did not bring the bottles. Takes meds directly from prescription bottles, does not use a pill box. And family is not in the habit of monitoring meds. No home BP monitoring reported. Has not taken any meds yet today, here at 2:00 pm.    PMH:   Hypertension with concentric remodeling, EF 60%, grade II diastolic dysfunction; Cardiology 6/22.   Hyperlipidemia. HDL 73,  June '22.  Asthma/COPD, largely asymptomatic.   Parkinson's Dis, Memory impairment; Neurology 4/22.  Lumbar Spondylosis, Pain Mgmt 8/21.  Syncope.   Recent Psych eval for hallucinations, which patient attributes to Donepezil started earlier this year - no longer taking.  GERD, Diverticulosis - outside GI [facility] 1/22.  Liver cyst 1.7cm and mild aortic atherosclerosis on CT Abdomen 2020.  BMD normal. Eye exam 7/22. Flu shot 11/20. COVID ([employer]) x 3.    PSH: Hysterectomy, Bilateral Cataracts.     Social: Non-smoker, no alcohol. , lives alone, does not drive, son and daughter are local.     Allergies: reviewed, include ASA, Lisinopril, Metoprolol, Hydralazine and Statins?     Medications: Advair - not using, Albuterol on occasion. Amlodipine 5, Atorvastatin 20, Astelin, Atrovent nasal, Calcium plus D, Carbidopa/Levodopa, Diclofenac gel, Famotidine, Fexofenadine, Loperamide prn, Fish oil caps, Gabapentin, Quetiapine.       Review of Systems   Constitutional: Negative for activity change, appetite change, fatigue, fever and unexpected weight change.   HENT: Negative for nasal congestion, ear pain, hearing loss, rhinorrhea, sneezing, sore throat, trouble swallowing and voice change.    Eyes: Negative for pain and visual  "disturbance.   Respiratory: Negative for cough, chest tightness, shortness of breath and wheezing.    Cardiovascular: Negative for chest pain, palpitations and leg swelling.   Gastrointestinal: Negative for abdominal pain, blood in stool, constipation, diarrhea, nausea and vomiting.   Genitourinary: Negative for dysuria and frequency.   Musculoskeletal: Positive for gait problem. Negative for arthralgias, joint swelling and myalgias.   Integumentary:  Negative for color change and rash.   Neurological: Negative for dizziness, syncope, facial asymmetry, speech difficulty, weakness, numbness and headaches.   Hematological: Negative for adenopathy. Does not bruise/bleed easily.   Psychiatric/Behavioral: Negative for behavioral problems and dysphoric mood.         Objective:    /100, repeat 210/100, Pulse 67, Temp 98.1, O2 Sat 96%, Ht 5' 4", Wt 108 lbs, BMI=18.5  Physical Exam  Vitals reviewed.   Constitutional:       General: She is not in acute distress.     Appearance: She is well-developed. She is not ill-appearing or diaphoretic.   HENT:      Head: Normocephalic and atraumatic.      Right Ear: Tympanic membrane and ear canal normal.      Left Ear: Tympanic membrane and ear canal normal.      Nose: Nose normal. No congestion.      Mouth/Throat:      Mouth: Mucous membranes are moist.      Pharynx: Oropharynx is clear.   Eyes:      General: No scleral icterus.     Extraocular Movements: Extraocular movements intact.      Conjunctiva/sclera: Conjunctivae normal.      Right eye: Right conjunctiva is not injected.      Left eye: Left conjunctiva is not injected.   Neck:      Thyroid: No thyromegaly.      Vascular: No carotid bruit or JVD.   Cardiovascular:      Rate and Rhythm: Normal rate and regular rhythm.      Pulses: Normal pulses.      Heart sounds: Normal heart sounds. No murmur heard.  Pulmonary:      Effort: Pulmonary effort is normal. No respiratory distress.      Breath sounds: Normal breath sounds. No " wheezing, rhonchi or rales.   Abdominal:      General: Bowel sounds are normal. There is no distension.      Palpations: Abdomen is soft. There is no mass.      Tenderness: There is no abdominal tenderness.   Musculoskeletal:         General: No tenderness or deformity. Normal range of motion.      Cervical back: Normal range of motion and neck supple.      Right lower leg: No edema.      Left lower leg: No edema.   Lymphadenopathy:      Cervical: No cervical adenopathy.   Skin:     General: Skin is warm and dry.      Coloration: Skin is not pale.      Findings: No erythema or rash.      Nails: There is no clubbing.   Neurological:      General: No focal deficit present.      Mental Status: She is alert.      Cranial Nerves: No cranial nerve deficit.      Motor: No abnormal muscle tone.      Deep Tendon Reflexes: Reflexes are normal and symmetric.   Psychiatric:         Mood and Affect: Mood normal.         Behavior: Behavior normal.      Labs 6/22/22: H/H 11.6/36 (from 12/38), CMP normal except BUN/CR 45/1.7, GFR 33 (from 23/1.3, 46), TSH normal 2.501.    Assessment:       Problem List Items Addressed This Visit     HLD (hyperlipidemia)    Parkinsonism    Chronic diastolic congestive heart failure      Other Visit Diagnoses     Severe uncontrolled hypertension    -  Primary    Relevant Medications    amLODIPine (NORVASC) 10 MG tablet    Acute kidney injury        Relevant Orders    Renal Function Panel (Completed)    Normocytic anemia        Relevant Orders    CBC Without Differential (Completed)    Iron and TIBC (Completed)    Need for pneumococcal vaccine              Plan:       Severe uncontrolled hypertension - asymptomatic.  -     Increase AmLODIPine (NORVASC) 10 MG tablet; Take 1 tablet (10 mg total) by mouth once daily. For Blood Pressure.  Dispense: 90 tablet; Refill: 1  Strict compliance encouraged, pill box advised, family monitoring encouraged.    Acute kidney injury  -     Renal Function Panel; Future;  Expected date: 07/21/2022    Normocytic anemia  -     CBC Without Differential; Future; Expected date: 07/21/2022  -     Iron and TIBC; Future; Expected date: 07/21/2022    Hyperlipidemia, unspecified hyperlipidemia type        -     Current lab result un-medicated, Atorvastatin was last ordered in 2019.    Chronic diastolic congestive heart failure        -      No symptoms or findings of heart failure at this time.     Parkinson's disease        -      Continue current med and follow up with Neurology.     Need for pneumococcal vaccine - Prevnar 20 recommended today.

## 2022-07-28 ENCOUNTER — PATIENT MESSAGE (OUTPATIENT)
Dept: PRIMARY CARE CLINIC | Facility: CLINIC | Age: 77
End: 2022-07-28
Payer: MEDICARE

## 2022-07-29 DIAGNOSIS — R39.81 URINARY INCONTINENCE DUE TO COGNITIVE IMPAIRMENT: Primary | ICD-10-CM

## 2022-07-29 NOTE — TELEPHONE ENCOUNTER
----- Message from Steph Cole sent at 7/29/2022  3:06 PM CDT -----  She requests a script/order for adult pampers.    Thank you

## 2022-08-01 ENCOUNTER — PATIENT MESSAGE (OUTPATIENT)
Dept: PRIMARY CARE CLINIC | Facility: CLINIC | Age: 77
End: 2022-08-01
Payer: MEDICARE

## 2022-08-03 ENCOUNTER — PATIENT MESSAGE (OUTPATIENT)
Dept: INTERNAL MEDICINE | Facility: CLINIC | Age: 77
End: 2022-08-03
Payer: MEDICARE

## 2022-08-08 ENCOUNTER — PATIENT MESSAGE (OUTPATIENT)
Dept: PRIMARY CARE CLINIC | Facility: CLINIC | Age: 77
End: 2022-08-08
Payer: MEDICARE

## 2022-08-17 NOTE — PROGRESS NOTES
"[facility] Note    Chief Complaint   Requesting home health and physical therapy    History of Present Illness      Madeline Reilly is a 76 y.o. female who presents today for requests of home health and physical therapy. She had a fall last month which may have been caused by dehydration. Patient is adamant on continuing to live in her own home. She is requesting help with ADL's with home health and a desire to become more mobile with physical therapy. Her BP is 192/104. Patient reports she swallowed her BP medication, amlodipine,  2 hours prior to visit. She states her BP " goes very high and then goes very low". She does not have a written or documented history of these readings to show me. I am not sure if patient is compliant as she says she is with her medications. Much of the information I gathered from her was asked a few times before I received an answer which was appropriate to my question. She kept saying that she was "used to this blood pressure being high".   Her daughter is present at this visit.  She denies any SOB, chest pain, N/V,  loss of appetite,diarrhea, constipation.     Problem List Items Addressed This Visit        Cardiac/Vascular    Hypertension    Relevant Medications    losartan (COZAAR) 25 MG tablet       Other    Impaired mobility and activities of daily living - Primary    Relevant Orders    Ambulatory referral/consult to Physical/Occupational Therapy    Ambulatory referral/consult to Home Health      Other Visit Diagnoses     Generalized weakness        Relevant Orders    Ambulatory referral/consult to Physical/Occupational Therapy    Ambulatory referral/consult to Home Health        - She is current on labs.  Review of Systems   Constitutional: Negative.    HENT: Negative.    Eyes: Negative.    Respiratory: Negative.    Cardiovascular: Negative.    Gastrointestinal: Negative.    Genitourinary: Negative.    Musculoskeletal: Positive for falls.   Skin: Negative.  "   Neurological: Positive for dizziness and weakness.   Endo/Heme/Allergies: Negative.    Psychiatric/Behavioral: Negative.    All 12 systems otherwise negative.       Past Medical History:  Past Medical History:   Diagnosis Date    Arthritis     Asthma, chronic     Cataract     Hypertension     Orthostatic hypotension        Past Surgical History:  Past Surgical History:   Procedure Laterality Date    CATARACT EXTRACTION      CATARACT EXTRACTION, BILATERAL      PARTIAL HYSTERECTOMY  1981       Family History:  family history includes Cancer in her maternal aunt and maternal grandmother.   Family history was reviewed with patient.    Social History:  Social History     Socioeconomic History    Marital status:    Tobacco Use    Smoking status: Never Smoker    Smokeless tobacco: Never Used   Substance and Sexual Activity    Alcohol use: No    Drug use: No   Social History Narrative    Retired .         Medications:  Outpatient Encounter Medications as of 8/25/2022   Medication Sig Note Dispense Refill    ADVAIR DISKUS 500-50 mcg/dose DsDv diskus inhaler Inhale 1 puff into the lungs 2 (two) times daily as needed.  3/16/2017: Received from: External Pharmacy  3    ALBUTEROL SULFATE (VENTOLIN INHL) Inhale into the lungs every 4 (four) hours as needed.       amLODIPine (NORVASC) 10 MG tablet Take 1 tablet (10 mg total) by mouth once daily. For Blood Pressure.  90 tablet 1    amLODIPine (NORVASC) 5 MG tablet Take 5 mg by mouth 2 (two) times daily.       atorvastatin (LIPITOR) 20 MG tablet TAKE 1 TABLET(20 MG) BY MOUTH EVERY DAY  30 tablet 0    azelastine (ASTELIN) 137 mcg (0.1 %) nasal spray SPRAY TWICE INTO EACH NOSTRIL ONCE  Q DAY 3/16/2017: Received from: External Pharmacy  4    calcium-vitamin D 500-125 mg-unit tablet Take 1 tablet by mouth every other day.        carbidopa-levodopa  mg (SINEMET)  mg per tablet Take 0.5 tablets by mouth 3 (three) times daily.  45  tablet 11    cholecalciferol, vitamin D3, (VITAMIN D3) 25 mcg (1,000 unit) capsule Take 1,000 Units by mouth.       diaper,brief,adult,disposable Misc Use as dictioned  96 each 5    diclofenac sodium (VOLTAREN) 1 % Gel Apply 2 g topically 4 (four) times daily.  200 g 2    famotidine-calcium carbonate-magnesium hydroxide (PEPCID COMPLETE) chewable tablet Take 1 tablet by mouth daily as needed.        fexofenadine (ALLEGRA) 60 MG tablet Take 60 mg by mouth once as needed.       ipratropium (ATROVENT) 0.06 % nasal spray SPRAY TWICE IN EACH NOSTRIL BID 3/16/2017: Received from: External Pharmacy  4    loperamide (IMODIUM) 2 mg capsule TK ONE C PO  QID PRN   11    omega-3s-dha-epa-fish oil 200 mg-300 mg- 100 mg-1,000 mg Cap Take by mouth.       pneumoc 20-chante conj-dip cr,PF, (PREVNAR 20, PF,) 0.5 mL Syrg injection Inject into the muscle.  0.5 mL 0    losartan (COZAAR) 25 MG tablet Take 1 tablet (25 mg total) by mouth once daily.  90 tablet 3    [DISCONTINUED] gabapentin (NEURONTIN) 100 MG capsule TAKE 1 CAPSULE(100 MG) BY MOUTH EVERY EVENING (Patient not taking: Reported on 7/21/2022)  90 capsule 0    [DISCONTINUED] QUEtiapine (SEROQUEL) 25 MG Tab Take 1 tablet (25 mg total) by mouth every evening. (Patient not taking: No sig reported)  30 tablet 11     No facility-administered encounter medications on file as of 8/25/2022.       Allergies:  Review of patient's allergies indicates:   Allergen Reactions    Lisinopril Shortness Of Breath, Diarrhea and Nausea Only     All side affects from medication    Aspirin Nausea And Vomiting    Beef containing products     Pork/porcine containing products     Statins-hmg-coa reductase inhibitors Diarrhea and Nausea And Vomiting    Toprol xl [metoprolol succinate] Other (See Comments)     Low blood preasure    Hydralazine-reserpin-hcthiazid Diarrhea       Health Maintenance:  Health Maintenance   Topic Date Due    TETANUS VACCINE  Never done    DEXA Scan   "05/09/2024    Lipid Panel  06/22/2027    Hepatitis C Screening  Completed     Health Maintenance Topics with due status: Not Due       Topic Last Completion Date    DEXA Scan 05/09/2019    Influenza Vaccine 11/16/2020    Lipid Panel 06/22/2022       Physical Exam      Vital Signs  Temp: 98 °F (36.7 °C)  Pulse: 62  Resp: 18  SpO2: 95 %  BP: (!) 204/110  Pain Score: 0-No pain  Height and Weight  Height: 5' 4" (162.6 cm)  Weight: 50.3 kg (110 lb 14.3 oz)  BSA (Calculated - sq m): 1.51 sq meters  BMI (Calculated): 19  Weight in (lb) to have BMI = 25: 145.3]    Physical Exam  Vitals reviewed.   Constitutional:       Appearance: Normal appearance.   HENT:      Head: Normocephalic and atraumatic.      Nose: Nose normal.      Mouth/Throat:      Mouth: Mucous membranes are moist.      Pharynx: Oropharynx is clear.   Eyes:      Extraocular Movements: Extraocular movements intact.      Conjunctiva/sclera: Conjunctivae normal.      Pupils: Pupils are equal, round, and reactive to light.   Cardiovascular:      Rate and Rhythm: Normal rate and regular rhythm.      Pulses: Normal pulses.      Heart sounds: Normal heart sounds.   Pulmonary:      Effort: Pulmonary effort is normal.      Breath sounds: Normal breath sounds.   Musculoskeletal:         General: Normal range of motion.      Cervical back: Normal range of motion and neck supple.      Comments: + ambulates with a cane   Skin:     General: Skin is dry.      Capillary Refill: Capillary refill takes less than 2 seconds.   Neurological:      General: No focal deficit present.      Mental Status: She is alert and oriented to person, place, and time. Mental status is at baseline.   Psychiatric:         Mood and Affect: Mood normal.         Behavior: Behavior normal.         Thought Content: Thought content normal.         Judgment: Judgment normal.          Laboratory:  CBC:  Recent Labs   Lab 11/16/20  1510 06/22/22  0933 07/21/22  1515   WBC 5.06 5.64 5.73   RBC 4.08 3.81 L " 4.03   Hemoglobin 12.1 11.6 L 12.1   Hematocrit 38.7 36.1 L 38.9   Platelets 217 199 221   MCV 95 95 97   MCH 29.7 30.4 30.0   MCHC 31.3 L 32.1 31.1 L     CMP:  Recent Labs   Lab 11/16/20  1510 06/22/22  0933 07/21/22  1515   Glucose 92 78 78   Calcium 9.8 9.6 10.3   Albumin 4.4 4.3 4.5   Total Protein 7.8 7.7  --    Sodium 142 142 140   Potassium 4.2 4.4 4.7   CO2 28 24 26   Chloride 106 109 104   BUN 23 45 H 36 H   Alkaline Phosphatase 41 L 38 L  --    ALT 6 L 9 L  --    AST 14 14  --    Total Bilirubin 0.5 0.7  --      URINALYSIS:  Recent Labs   Lab 02/03/20  1108 03/10/20  1339   Color, UA Samia yellow   Specific [place], UA 1.025  --    Spec Grav UA  --  1.000   pH, UA 5.0 5   Protein, UA Negative  --    Bacteria Many A  --    Nitrite, UA Negative neg   Leukocytes, UA 2+ A  --    Urobilinogen, UA  --  1      LIPIDS:  Recent Labs   Lab 11/16/20  1510 06/22/22  0933   TSH 1.982 2.501   HDL 76 H 73   Cholesterol 219 H 236 H   Triglycerides 64 59   LDL Cholesterol 130.2 151.2   HDL/Cholesterol Ratio 34.7 30.9   Non-HDL Cholesterol 143 163   Total Cholesterol/HDL Ratio 2.9 3.2     TSH:  Recent Labs   Lab 11/16/20  1510 06/22/22  0933   TSH 1.982 2.501     A1C:        Radiology:        Assessment/Plan     Madeline Reilly is a 76 y.o.female with:    Impaired mobility and activities of daily living  -     Ambulatory referral/consult to Physical/Occupational Therapy; Future; Expected date: 09/01/2022  -     Ambulatory referral/consult to Home Health; Future; Expected date: 08/26/2022    Primary hypertension  -     losartan (COZAAR) 25 MG tablet; Take 1 tablet (25 mg total) by mouth once daily.  Dispense: 90 tablet; Refill: 3    Generalized weakness  -     Ambulatory referral/consult to Physical/Occupational Therapy; Future; Expected date: 09/01/2022  -     Ambulatory referral/consult to Home Health; Future; Expected date: 08/26/2022    - Advised patient keep a BP diary with BP readings taken in the morning and evening  for the next 2 weeks. Patient or daughter has agreed to send me readings through the portal.    As above, continue current medications and maintain follow up with specialists.  Return to clinic as needed.    I spent 61 minutes on the day of this encounter for preparing, evaluating, examining, treating, and discussing plan of care with this patient.  Greater than 50% of this time was spent face to face with patient.  All questions were answered to patient's satisfaction.         Glenda Alex, NP-C  [facility]

## 2022-08-20 ENCOUNTER — PATIENT MESSAGE (OUTPATIENT)
Dept: PRIMARY CARE CLINIC | Facility: CLINIC | Age: 77
End: 2022-08-20
Payer: MEDICARE

## 2022-08-22 ENCOUNTER — TELEPHONE (OUTPATIENT)
Dept: NEUROLOGY | Facility: CLINIC | Age: 77
End: 2022-08-22
Payer: MEDICARE

## 2022-08-22 NOTE — TELEPHONE ENCOUNTER
"SW spoke to son Heladio, who expressed his concerns about pt living alone, especially in light of her hospitalization one month ago for a fall, where they found her to be dehydrated.Son reports that pt is "probably falling ore than she lets on", has difficulty attending doctors appts, is not taking medication consistently, is having more frequent hallucinations at night that frighten her.   Son reports that pt hasn't wanted HH before but is now saying that she would like it. Pt still adamant, even after recent hospitalization, that she would like to remain in her home.   SW advised pt's son on approaching a discussion about pt's long term care plans (ie. Avoiding arguments, getting the whole family on the same page, expressing that an SNF would help pt maintain independence and mobility rather than restrict it).  "

## 2022-08-25 ENCOUNTER — TELEPHONE (OUTPATIENT)
Dept: PRIMARY CARE CLINIC | Facility: CLINIC | Age: 77
End: 2022-08-25

## 2022-08-25 ENCOUNTER — OFFICE VISIT (OUTPATIENT)
Dept: PRIMARY CARE CLINIC | Facility: CLINIC | Age: 77
End: 2022-08-25
Payer: MEDICARE

## 2022-08-25 VITALS
HEART RATE: 62 BPM | HEIGHT: 64 IN | DIASTOLIC BLOOD PRESSURE: 110 MMHG | OXYGEN SATURATION: 95 % | SYSTOLIC BLOOD PRESSURE: 204 MMHG | BODY MASS INDEX: 18.93 KG/M2 | TEMPERATURE: 98 F | WEIGHT: 110.88 LBS | RESPIRATION RATE: 18 BRPM

## 2022-08-25 DIAGNOSIS — R53.1 GENERALIZED WEAKNESS: ICD-10-CM

## 2022-08-25 DIAGNOSIS — I10 PRIMARY HYPERTENSION: ICD-10-CM

## 2022-08-25 DIAGNOSIS — Z74.09 IMPAIRED MOBILITY AND ACTIVITIES OF DAILY LIVING: Primary | ICD-10-CM

## 2022-08-25 DIAGNOSIS — Z78.9 IMPAIRED MOBILITY AND ACTIVITIES OF DAILY LIVING: Primary | ICD-10-CM

## 2022-08-25 PROCEDURE — 1101F PR PT FALLS ASSESS DOC 0-1 FALLS W/OUT INJ PAST YR: ICD-10-PCS | Mod: CPTII,S$GLB,, | Performed by: NURSE PRACTITIONER

## 2022-08-25 PROCEDURE — 1160F RVW MEDS BY RX/DR IN RCRD: CPT | Mod: CPTII,S$GLB,, | Performed by: NURSE PRACTITIONER

## 2022-08-25 PROCEDURE — 1159F MED LIST DOCD IN RCRD: CPT | Mod: CPTII,S$GLB,, | Performed by: NURSE PRACTITIONER

## 2022-08-25 PROCEDURE — 3288F FALL RISK ASSESSMENT DOCD: CPT | Mod: CPTII,S$GLB,, | Performed by: NURSE PRACTITIONER

## 2022-08-25 PROCEDURE — 1126F AMNT PAIN NOTED NONE PRSNT: CPT | Mod: CPTII,S$GLB,, | Performed by: NURSE PRACTITIONER

## 2022-08-25 PROCEDURE — 99215 OFFICE O/P EST HI 40 MIN: CPT | Mod: S$GLB,,, | Performed by: NURSE PRACTITIONER

## 2022-08-25 PROCEDURE — 1160F PR REVIEW ALL MEDS BY PRESCRIBER/CLIN PHARMACIST DOCUMENTED: ICD-10-PCS | Mod: CPTII,S$GLB,, | Performed by: NURSE PRACTITIONER

## 2022-08-25 PROCEDURE — 3077F PR MOST RECENT SYSTOLIC BLOOD PRESSURE >= 140 MM HG: ICD-10-PCS | Mod: CPTII,S$GLB,, | Performed by: NURSE PRACTITIONER

## 2022-08-25 PROCEDURE — 3288F PR FALLS RISK ASSESSMENT DOCUMENTED: ICD-10-PCS | Mod: CPTII,S$GLB,, | Performed by: NURSE PRACTITIONER

## 2022-08-25 PROCEDURE — 3080F PR MOST RECENT DIASTOLIC BLOOD PRESSURE >= 90 MM HG: ICD-10-PCS | Mod: CPTII,S$GLB,, | Performed by: NURSE PRACTITIONER

## 2022-08-25 PROCEDURE — 99999 PR PBB SHADOW E&M-EST. PATIENT-LVL V: CPT | Mod: PBBFAC,,, | Performed by: NURSE PRACTITIONER

## 2022-08-25 PROCEDURE — 3080F DIAST BP >= 90 MM HG: CPT | Mod: CPTII,S$GLB,, | Performed by: NURSE PRACTITIONER

## 2022-08-25 PROCEDURE — 1126F PR PAIN SEVERITY QUANTIFIED, NO PAIN PRESENT: ICD-10-PCS | Mod: CPTII,S$GLB,, | Performed by: NURSE PRACTITIONER

## 2022-08-25 PROCEDURE — 99215 PR OFFICE/OUTPT VISIT, EST, LEVL V, 40-54 MIN: ICD-10-PCS | Mod: S$GLB,,, | Performed by: NURSE PRACTITIONER

## 2022-08-25 PROCEDURE — 1101F PT FALLS ASSESS-DOCD LE1/YR: CPT | Mod: CPTII,S$GLB,, | Performed by: NURSE PRACTITIONER

## 2022-08-25 PROCEDURE — 3077F SYST BP >= 140 MM HG: CPT | Mod: CPTII,S$GLB,, | Performed by: NURSE PRACTITIONER

## 2022-08-25 PROCEDURE — 1159F PR MEDICATION LIST DOCUMENTED IN MEDICAL RECORD: ICD-10-PCS | Mod: CPTII,S$GLB,, | Performed by: NURSE PRACTITIONER

## 2022-08-25 PROCEDURE — 99999 PR PBB SHADOW E&M-EST. PATIENT-LVL V: ICD-10-PCS | Mod: PBBFAC,,, | Performed by: NURSE PRACTITIONER

## 2022-08-25 RX ORDER — LOSARTAN POTASSIUM 25 MG/1
25 TABLET ORAL DAILY
Qty: 90 TABLET | Refills: 3 | Status: SHIPPED | OUTPATIENT
Start: 2022-08-25 | End: 2023-06-27 | Stop reason: SDUPTHER

## 2022-08-25 RX ORDER — AMLODIPINE BESYLATE 5 MG/1
5 TABLET ORAL 2 TIMES DAILY
COMMUNITY
Start: 2022-08-19 | End: 2022-11-16

## 2022-08-25 NOTE — TELEPHONE ENCOUNTER
lvm on patient's mobile phone number, [phone] informing patient to call back in regards to home health order.

## 2022-09-02 DIAGNOSIS — N18.32 STAGE 3B CHRONIC KIDNEY DISEASE: Primary | ICD-10-CM

## 2022-09-06 ENCOUNTER — LAB VISIT (OUTPATIENT)
Dept: LAB | Facility: HOSPITAL | Age: 77
End: 2022-09-06
Attending: INTERNAL MEDICINE
Payer: MEDICARE

## 2022-09-06 DIAGNOSIS — N18.32 STAGE 3B CHRONIC KIDNEY DISEASE: ICD-10-CM

## 2022-09-06 LAB
ALBUMIN SERPL BCP-MCNC: 4.2 G/DL (ref 3.5–5.2)
ALP SERPL-CCNC: 40 U/L (ref 55–135)
ALT SERPL W/O P-5'-P-CCNC: <5 U/L (ref 10–44)
ANION GAP SERPL CALC-SCNC: 13 MMOL/L (ref 8–16)
AST SERPL-CCNC: 17 U/L (ref 10–40)
BASOPHILS # BLD AUTO: 0.05 K/UL (ref 0–0.2)
BASOPHILS NFR BLD: 0.8 % (ref 0–1.9)
BILIRUB SERPL-MCNC: 0.7 MG/DL (ref 0.1–1)
BUN SERPL-MCNC: 41 MG/DL (ref 8–23)
CALCIUM SERPL-MCNC: 9.6 MG/DL (ref 8.7–10.5)
CHLORIDE SERPL-SCNC: 102 MMOL/L (ref 95–110)
CO2 SERPL-SCNC: 27 MMOL/L (ref 23–29)
CREAT SERPL-MCNC: 1.7 MG/DL (ref 0.5–1.4)
DIFFERENTIAL METHOD: ABNORMAL
EOSINOPHIL # BLD AUTO: 0.1 K/UL (ref 0–0.5)
EOSINOPHIL NFR BLD: 1.7 % (ref 0–8)
ERYTHROCYTE [DISTWIDTH] IN BLOOD BY AUTOMATED COUNT: 14.6 % (ref 11.5–14.5)
EST. GFR  (NO RACE VARIABLE): 30.5 ML/MIN/1.73 M^2
GLUCOSE SERPL-MCNC: 86 MG/DL (ref 70–110)
HCT VFR BLD AUTO: 34.3 % (ref 37–48.5)
HGB BLD-MCNC: 11.1 G/DL (ref 12–16)
IMM GRANULOCYTES # BLD AUTO: 0.02 K/UL (ref 0–0.04)
IMM GRANULOCYTES NFR BLD AUTO: 0.3 % (ref 0–0.5)
LYMPHOCYTES # BLD AUTO: 2 K/UL (ref 1–4.8)
LYMPHOCYTES NFR BLD: 32.6 % (ref 18–48)
MCH RBC QN AUTO: 30.1 PG (ref 27–31)
MCHC RBC AUTO-ENTMCNC: 32.4 G/DL (ref 32–36)
MCV RBC AUTO: 93 FL (ref 82–98)
MONOCYTES # BLD AUTO: 0.6 K/UL (ref 0.3–1)
MONOCYTES NFR BLD: 10.7 % (ref 4–15)
NEUTROPHILS # BLD AUTO: 3.2 K/UL (ref 1.8–7.7)
NEUTROPHILS NFR BLD: 53.9 % (ref 38–73)
NRBC BLD-RTO: 0 /100 WBC
PHOSPHATE SERPL-MCNC: 4.2 MG/DL (ref 2.7–4.5)
PLATELET # BLD AUTO: 206 K/UL (ref 150–450)
PMV BLD AUTO: 10.3 FL (ref 9.2–12.9)
POTASSIUM SERPL-SCNC: 4.1 MMOL/L (ref 3.5–5.1)
PROT SERPL-MCNC: 7.4 G/DL (ref 6–8.4)
PTH-INTACT SERPL-MCNC: 89.2 PG/ML (ref 9–77)
RBC # BLD AUTO: 3.69 M/UL (ref 4–5.4)
SODIUM SERPL-SCNC: 142 MMOL/L (ref 136–145)
URATE SERPL-MCNC: 5.8 MG/DL (ref 2.4–5.7)
WBC # BLD AUTO: 5.98 K/UL (ref 3.9–12.7)

## 2022-09-06 PROCEDURE — 80053 COMPREHEN METABOLIC PANEL: CPT | Performed by: INTERNAL MEDICINE

## 2022-09-06 PROCEDURE — 85025 COMPLETE CBC W/AUTO DIFF WBC: CPT | Performed by: INTERNAL MEDICINE

## 2022-09-06 PROCEDURE — 84100 ASSAY OF PHOSPHORUS: CPT | Performed by: INTERNAL MEDICINE

## 2022-09-06 PROCEDURE — 84550 ASSAY OF BLOOD/URIC ACID: CPT | Performed by: INTERNAL MEDICINE

## 2022-09-06 PROCEDURE — 36415 COLL VENOUS BLD VENIPUNCTURE: CPT | Mod: PN | Performed by: INTERNAL MEDICINE

## 2022-09-06 PROCEDURE — 83970 ASSAY OF PARATHORMONE: CPT | Performed by: INTERNAL MEDICINE

## 2022-09-07 ENCOUNTER — LAB VISIT (OUTPATIENT)
Dept: LAB | Facility: HOSPITAL | Age: 77
End: 2022-09-07
Attending: INTERNAL MEDICINE
Payer: MEDICARE

## 2022-09-07 ENCOUNTER — OFFICE VISIT (OUTPATIENT)
Dept: NEPHROLOGY | Facility: CLINIC | Age: 77
End: 2022-09-07
Payer: MEDICARE

## 2022-09-07 VITALS
SYSTOLIC BLOOD PRESSURE: 184 MMHG | HEART RATE: 60 BPM | WEIGHT: 106.94 LBS | BODY MASS INDEX: 18.26 KG/M2 | DIASTOLIC BLOOD PRESSURE: 96 MMHG | HEIGHT: 64 IN | OXYGEN SATURATION: 98 %

## 2022-09-07 DIAGNOSIS — N18.32 STAGE 3B CHRONIC KIDNEY DISEASE: Primary | ICD-10-CM

## 2022-09-07 DIAGNOSIS — N18.32 STAGE 3B CHRONIC KIDNEY DISEASE: ICD-10-CM

## 2022-09-07 DIAGNOSIS — N39.0 UTI (URINARY TRACT INFECTION), UNCOMPLICATED: ICD-10-CM

## 2022-09-07 DIAGNOSIS — I10 PRIMARY HYPERTENSION: ICD-10-CM

## 2022-09-07 DIAGNOSIS — J44.9 CHRONIC OBSTRUCTIVE PULMONARY DISEASE, UNSPECIFIED COPD TYPE: ICD-10-CM

## 2022-09-07 PROCEDURE — 3080F PR MOST RECENT DIASTOLIC BLOOD PRESSURE >= 90 MM HG: ICD-10-PCS | Mod: CPTII,S$GLB,, | Performed by: INTERNAL MEDICINE

## 2022-09-07 PROCEDURE — 1126F PR PAIN SEVERITY QUANTIFIED, NO PAIN PRESENT: ICD-10-PCS | Mod: CPTII,S$GLB,, | Performed by: INTERNAL MEDICINE

## 2022-09-07 PROCEDURE — 99205 PR OFFICE/OUTPT VISIT, NEW, LEVL V, 60-74 MIN: ICD-10-PCS | Mod: S$GLB,,, | Performed by: INTERNAL MEDICINE

## 2022-09-07 PROCEDURE — 3288F PR FALLS RISK ASSESSMENT DOCUMENTED: ICD-10-PCS | Mod: CPTII,S$GLB,, | Performed by: INTERNAL MEDICINE

## 2022-09-07 PROCEDURE — 3077F SYST BP >= 140 MM HG: CPT | Mod: CPTII,S$GLB,, | Performed by: INTERNAL MEDICINE

## 2022-09-07 PROCEDURE — 99499 UNLISTED E&M SERVICE: CPT | Mod: S$GLB,,, | Performed by: INTERNAL MEDICINE

## 2022-09-07 PROCEDURE — 1160F RVW MEDS BY RX/DR IN RCRD: CPT | Mod: CPTII,S$GLB,, | Performed by: INTERNAL MEDICINE

## 2022-09-07 PROCEDURE — 1159F MED LIST DOCD IN RCRD: CPT | Mod: CPTII,S$GLB,, | Performed by: INTERNAL MEDICINE

## 2022-09-07 PROCEDURE — 1101F PR PT FALLS ASSESS DOC 0-1 FALLS W/OUT INJ PAST YR: ICD-10-PCS | Mod: CPTII,S$GLB,, | Performed by: INTERNAL MEDICINE

## 2022-09-07 PROCEDURE — 1159F PR MEDICATION LIST DOCUMENTED IN MEDICAL RECORD: ICD-10-PCS | Mod: CPTII,S$GLB,, | Performed by: INTERNAL MEDICINE

## 2022-09-07 PROCEDURE — 3288F FALL RISK ASSESSMENT DOCD: CPT | Mod: CPTII,S$GLB,, | Performed by: INTERNAL MEDICINE

## 2022-09-07 PROCEDURE — 1101F PT FALLS ASSESS-DOCD LE1/YR: CPT | Mod: CPTII,S$GLB,, | Performed by: INTERNAL MEDICINE

## 2022-09-07 PROCEDURE — 1126F AMNT PAIN NOTED NONE PRSNT: CPT | Mod: CPTII,S$GLB,, | Performed by: INTERNAL MEDICINE

## 2022-09-07 PROCEDURE — 3077F PR MOST RECENT SYSTOLIC BLOOD PRESSURE >= 140 MM HG: ICD-10-PCS | Mod: CPTII,S$GLB,, | Performed by: INTERNAL MEDICINE

## 2022-09-07 PROCEDURE — 99999 PR PBB SHADOW E&M-EST. PATIENT-LVL V: ICD-10-PCS | Mod: PBBFAC,,, | Performed by: INTERNAL MEDICINE

## 2022-09-07 PROCEDURE — 1160F PR REVIEW ALL MEDS BY PRESCRIBER/CLIN PHARMACIST DOCUMENTED: ICD-10-PCS | Mod: CPTII,S$GLB,, | Performed by: INTERNAL MEDICINE

## 2022-09-07 PROCEDURE — 99999 PR PBB SHADOW E&M-EST. PATIENT-LVL V: CPT | Mod: PBBFAC,,, | Performed by: INTERNAL MEDICINE

## 2022-09-07 PROCEDURE — 3080F DIAST BP >= 90 MM HG: CPT | Mod: CPTII,S$GLB,, | Performed by: INTERNAL MEDICINE

## 2022-09-07 PROCEDURE — 87086 URINE CULTURE/COLONY COUNT: CPT | Performed by: INTERNAL MEDICINE

## 2022-09-07 PROCEDURE — 99205 OFFICE O/P NEW HI 60 MIN: CPT | Mod: S$GLB,,, | Performed by: INTERNAL MEDICINE

## 2022-09-07 PROCEDURE — 99499 RISK ADDL DX/OHS AUDIT: ICD-10-PCS | Mod: S$GLB,,, | Performed by: INTERNAL MEDICINE

## 2022-09-07 RX ORDER — HYDROCHLOROTHIAZIDE 12.5 MG/1
12.5 TABLET ORAL DAILY
Qty: 90 TABLET | Refills: 3 | Status: SHIPPED | OUTPATIENT
Start: 2022-09-07 | End: 2023-06-06

## 2022-09-07 NOTE — PROGRESS NOTES
New Consultation Report  Nephrology      Consult Requested By: PCP  Reason for Consult: CKD    History of Present Illness:  Patient is a 76 y.o. female presents for a new consultation for evaluation of elevated serum creatinine and presumed chronic kidney disease. The patient was found to have an elevated serum creatinine during routine laboratory testing, at 1.7 mg/dL.     The patient denies SOB, LE edema, hematuria or foamy urine. She reports that she began taking losartan several weeks ago. Denies any other recent medication change    The patient denies taking NSAIDs or new antibiotics, recreational drugs, recent episode of dehydration, diarrhea, nausea or vomiting, acute illness, hospitalization or exposure to IV radiocontrast.     Past Medical History:   Diagnosis Date    Arthritis     Asthma, chronic     Cataract     Hypertension     Orthostatic hypotension          Current Outpatient Medications:     ADVAIR DISKUS 500-50 mcg/dose DsDv diskus inhaler, Inhale 1 puff into the lungs 2 (two) times daily as needed. , Disp: , Rfl: 3    ALBUTEROL SULFATE (VENTOLIN INHL), Inhale into the lungs every 4 (four) hours as needed., Disp: , Rfl:     amLODIPine (NORVASC) 10 MG tablet, Take 1 tablet (10 mg total) by mouth once daily. For Blood Pressure., Disp: 90 tablet, Rfl: 1    atorvastatin (LIPITOR) 20 MG tablet, TAKE 1 TABLET(20 MG) BY MOUTH EVERY DAY, Disp: 30 tablet, Rfl: 0    azelastine (ASTELIN) 137 mcg (0.1 %) nasal spray, SPRAY TWICE INTO EACH NOSTRIL ONCE  Q DAY, Disp: , Rfl: 4    carbidopa-levodopa  mg (SINEMET)  mg per tablet, Take 0.5 tablets by mouth 3 (three) times daily., Disp: 45 tablet, Rfl: 11    cholecalciferol, vitamin D3, (VITAMIN D3) 25 mcg (1,000 unit) capsule, Take 1,000 Units by mouth., Disp: , Rfl:     diclofenac sodium (VOLTAREN) 1 % Gel, Apply 2 g topically 4 (four) times daily., Disp: 200 g, Rfl: 2    famotidine-calcium carbonate-magnesium hydroxide (PEPCID COMPLETE) chewable tablet,  "Take 1 tablet by mouth daily as needed. , Disp: , Rfl:     fexofenadine (ALLEGRA) 60 MG tablet, Take 60 mg by mouth once as needed., Disp: , Rfl:     ipratropium (ATROVENT) 0.06 % nasal spray, As needed, Disp: , Rfl: 4    loperamide (IMODIUM) 2 mg capsule, TK ONE C PO  QID PRN, Disp: , Rfl: 11    losartan (COZAAR) 25 MG tablet, Take 1 tablet (25 mg total) by mouth once daily., Disp: 90 tablet, Rfl: 3    amLODIPine (NORVASC) 5 MG tablet, Take 5 mg by mouth 2 (two) times daily., Disp: , Rfl:     calcium-vitamin D 500-125 mg-unit tablet, Take 1 tablet by mouth every other day. , Disp: , Rfl:     diaper,brief,adult,disposable Misc, Use as dictioned, Disp: 96 each, Rfl: 5    omega-3s-dha-epa-fish oil 200 mg-300 mg- 100 mg-1,000 mg Cap, Take by mouth., Disp: , Rfl:     pneumoc 20-chante conj-dip cr,PF, (PREVNAR 20, PF,) 0.5 mL Syrg injection, Inject into the muscle., Disp: 0.5 mL, Rfl: 0  Review of patient's allergies indicates:   Allergen Reactions    Lisinopril Shortness Of Breath, Diarrhea and Nausea Only     All side affects from medication    Aspirin Nausea And Vomiting    Beef containing products     Pork/porcine containing products     Statins-hmg-coa reductase inhibitors Diarrhea and Nausea And Vomiting    Toprol xl [metoprolol succinate] Other (See Comments)     Low blood preasure    Hydralazine-reserpin-hcthiazid Diarrhea        Past Surgical History:   Procedure Laterality Date    CATARACT EXTRACTION      CATARACT EXTRACTION, BILATERAL      PARTIAL HYSTERECTOMY  1981     Family History   Problem Relation Age of Onset    Cancer Maternal Grandmother         "abdomen"    Cancer Maternal Aunt         "abdominal cancer"     Social History     Tobacco Use    Smoking status: Never    Smokeless tobacco: Never   Substance Use Topics    Alcohol use: No    Drug use: No       Review of Systems   Constitutional:  Positive for weight loss. Negative for chills and fever.   Respiratory: Negative.     Gastrointestinal: Negative.  "   Genitourinary:  Positive for flank pain. Negative for dysuria, frequency, hematuria and urgency.   Skin: Negative.    All other systems reviewed and are negative.    Vitals:    09/07/22 1547   BP: (!) 184/96   Pulse: 60       PHYSICAL EXAMINATION:  General: no distress, well nourished  Skin: color, texture, turgor normal. No rash or lesions  HEENT: Eyes: reactive pupils, normal conjunctiva. Oral mucosa moist, no ulcers. Throat: no erythema.  Neck: supple, symmetrical, trachea midline, no JVD, no carotid bruit  Lungs: clear to auscultation bilaterally and normal respiratory effort  Cardiovascular: Heart: regular rate and rhythm, S1, S2 normal, no murmur, rub or gallop. Pulses: 2+ and symmetric.  Abdomen: bowel sounds present, no abdominal bruit, soft, non-tender non-distented; no masses, organomegaly or ascites.   Musculoskeletal: no pitting edema in lower extremities, no clubbing or cyanosis  Lymph Nodes: No cervical or supraclavicular adenopathy  Neurologic: AAOx3, normal strength and tone. No focal deficit. No asterixis.       LABORATORY DATA:  Lab Results   Component Value Date    CREATININE 1.7 (H) 09/06/2022       Prot/Creat Ratio, Urine   Date Value Ref Range Status   09/06/2022 0.10 0.00 - 0.20 Final       Lab Results   Component Value Date     09/06/2022    K 4.1 09/06/2022    CO2 27 09/06/2022       Lab Results   Component Value Date    PTH 89.2 (H) 09/06/2022    CALCIUM 9.6 09/06/2022    PHOS 4.2 09/06/2022       Lab Results   Component Value Date    HGB 11.1 (L) 09/06/2022        Lab Results   Component Value Date    HGBA1C 5.5 04/30/2019       Lab Results   Component Value Date    LDLCALC 151.2 06/22/2022         IMAGING STUDIES  Kidney US: Reviewed  Echocardiogram: Reviewed    IMPRESSION / RECOMMENDATIONS:     1. 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 UA  shows hematuria, leukocyturia. Mild anemia.   Will inspect urine sediment under microscopy    US Renal Artery Stenosis Hyperten (xpd)    US Retroperitoneal Complete      2. Primary hypertension  Uncontrolled on amlodipine and losartan. Will add chlorthalidone       3. Chronic obstructive pulmonary disease, unspecified COPD type  Managed by PCP            SUMMARY OF PLAN:  Add HCTZ  2.   Renal US + Doppler  Urine microscopy  Urine culture  Avoid NSAIDs  6.   RTC in 4 mo

## 2022-09-08 LAB
BACTERIA UR CULT: NORMAL
BACTERIA UR CULT: NORMAL

## 2022-09-11 ENCOUNTER — PATIENT MESSAGE (OUTPATIENT)
Dept: NEPHROLOGY | Facility: CLINIC | Age: 77
End: 2022-09-11
Payer: MEDICARE

## 2022-09-13 ENCOUNTER — PATIENT MESSAGE (OUTPATIENT)
Dept: PSYCHIATRY | Facility: CLINIC | Age: 77
End: 2022-09-13
Payer: MEDICARE

## 2022-09-13 ENCOUNTER — PATIENT MESSAGE (OUTPATIENT)
Dept: PRIMARY CARE CLINIC | Facility: CLINIC | Age: 77
End: 2022-09-13
Payer: MEDICARE

## 2022-09-13 ENCOUNTER — OFFICE VISIT (OUTPATIENT)
Dept: PSYCHIATRY | Facility: CLINIC | Age: 77
End: 2022-09-13
Payer: MEDICARE

## 2022-09-13 VITALS
DIASTOLIC BLOOD PRESSURE: 69 MMHG | SYSTOLIC BLOOD PRESSURE: 138 MMHG | BODY MASS INDEX: 18.15 KG/M2 | HEART RATE: 64 BPM | WEIGHT: 105.69 LBS

## 2022-09-13 DIAGNOSIS — G20.A1 PARKINSON'S DISEASE: Primary | ICD-10-CM

## 2022-09-13 PROCEDURE — 99214 OFFICE O/P EST MOD 30 MIN: CPT | Mod: S$GLB,,, | Performed by: PSYCHIATRY & NEUROLOGY

## 2022-09-13 PROCEDURE — 3078F DIAST BP <80 MM HG: CPT | Mod: CPTII,S$GLB,, | Performed by: PSYCHIATRY & NEUROLOGY

## 2022-09-13 PROCEDURE — 3075F SYST BP GE 130 - 139MM HG: CPT | Mod: CPTII,S$GLB,, | Performed by: PSYCHIATRY & NEUROLOGY

## 2022-09-13 PROCEDURE — 3078F PR MOST RECENT DIASTOLIC BLOOD PRESSURE < 80 MM HG: ICD-10-PCS | Mod: CPTII,S$GLB,, | Performed by: PSYCHIATRY & NEUROLOGY

## 2022-09-13 PROCEDURE — 99999 PR PBB SHADOW E&M-EST. PATIENT-LVL II: CPT | Mod: PBBFAC,,,

## 2022-09-13 PROCEDURE — 99214 PR OFFICE/OUTPT VISIT, EST, LEVL IV, 30-39 MIN: ICD-10-PCS | Mod: S$GLB,,, | Performed by: PSYCHIATRY & NEUROLOGY

## 2022-09-13 PROCEDURE — 3075F PR MOST RECENT SYSTOLIC BLOOD PRESS GE 130-139MM HG: ICD-10-PCS | Mod: CPTII,S$GLB,, | Performed by: PSYCHIATRY & NEUROLOGY

## 2022-09-13 PROCEDURE — 99999 PR PBB SHADOW E&M-EST. PATIENT-LVL II: ICD-10-PCS | Mod: PBBFAC,,,

## 2022-09-13 RX ORDER — QUETIAPINE FUMARATE 25 MG/1
25 TABLET, FILM COATED ORAL NIGHTLY
Qty: 30 TABLET | Refills: 1 | Status: SHIPPED | OUTPATIENT
Start: 2022-09-13 | End: 2022-12-19 | Stop reason: SDUPTHER
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
## 2022-09-14 RX ORDER — DONEPEZIL HYDROCHLORIDE 5 MG/1
5 TABLET, FILM COATED ORAL NIGHTLY
Qty: 90 TABLET | Refills: 1 | Status: SHIPPED | OUTPATIENT
Start: 2022-09-14 | End: 2023-03-14

## 2022-09-15 ENCOUNTER — PATIENT MESSAGE (OUTPATIENT)
Dept: PRIMARY CARE CLINIC | Facility: CLINIC | Age: 77
End: 2022-09-15
Payer: MEDICARE

## 2022-09-19 ENCOUNTER — PATIENT MESSAGE (OUTPATIENT)
Dept: NEPHROLOGY | Facility: CLINIC | Age: 77
End: 2022-09-19
Payer: MEDICARE

## 2022-09-21 ENCOUNTER — PATIENT MESSAGE (OUTPATIENT)
Dept: PRIMARY CARE CLINIC | Facility: CLINIC | Age: 77
End: 2022-09-21
Payer: MEDICARE

## 2022-09-26 ENCOUNTER — PATIENT MESSAGE (OUTPATIENT)
Dept: PRIMARY CARE CLINIC | Facility: CLINIC | Age: 77
End: 2022-09-26
Payer: MEDICARE

## 2022-10-31 NOTE — PROGRESS NOTES
"Staff Note:     Pt interviewed and case discussed.  I agree with Resident's findings and treatment plan.  Adverse effects were discussed including "black box" warning.    TLK    "

## 2022-11-02 ENCOUNTER — PES CALL (OUTPATIENT)
Dept: ADMINISTRATIVE | Facility: CLINIC | Age: 77
End: 2022-11-02
Payer: MEDICARE

## 2022-12-06 ENCOUNTER — OFFICE VISIT (OUTPATIENT)
Dept: CARDIOLOGY | Facility: CLINIC | Age: 77
End: 2022-12-06
Payer: MEDICARE

## 2022-12-06 VITALS
WEIGHT: 104.38 LBS | SYSTOLIC BLOOD PRESSURE: 154 MMHG | DIASTOLIC BLOOD PRESSURE: 108 MMHG | BODY MASS INDEX: 17.82 KG/M2 | HEIGHT: 64 IN | HEART RATE: 70 BPM

## 2022-12-06 DIAGNOSIS — E78.2 MIXED HYPERLIPIDEMIA: Primary | ICD-10-CM

## 2022-12-06 DIAGNOSIS — R55 VASOVAGAL SYNCOPE: ICD-10-CM

## 2022-12-06 DIAGNOSIS — I10 PRIMARY HYPERTENSION: ICD-10-CM

## 2022-12-06 DIAGNOSIS — I50.32 CHRONIC DIASTOLIC CONGESTIVE HEART FAILURE: ICD-10-CM

## 2022-12-06 PROCEDURE — 3288F FALL RISK ASSESSMENT DOCD: CPT | Mod: CPTII,S$GLB,, | Performed by: INTERNAL MEDICINE

## 2022-12-06 PROCEDURE — 3080F PR MOST RECENT DIASTOLIC BLOOD PRESSURE >= 90 MM HG: ICD-10-PCS | Mod: CPTII,S$GLB,, | Performed by: INTERNAL MEDICINE

## 2022-12-06 PROCEDURE — 1159F MED LIST DOCD IN RCRD: CPT | Mod: CPTII,S$GLB,, | Performed by: INTERNAL MEDICINE

## 2022-12-06 PROCEDURE — 1159F PR MEDICATION LIST DOCUMENTED IN MEDICAL RECORD: ICD-10-PCS | Mod: CPTII,S$GLB,, | Performed by: INTERNAL MEDICINE

## 2022-12-06 PROCEDURE — 1126F PR PAIN SEVERITY QUANTIFIED, NO PAIN PRESENT: ICD-10-PCS | Mod: CPTII,S$GLB,, | Performed by: INTERNAL MEDICINE

## 2022-12-06 PROCEDURE — 3080F DIAST BP >= 90 MM HG: CPT | Mod: CPTII,S$GLB,, | Performed by: INTERNAL MEDICINE

## 2022-12-06 PROCEDURE — 99999 PR PBB SHADOW E&M-EST. PATIENT-LVL III: ICD-10-PCS | Mod: PBBFAC,,, | Performed by: INTERNAL MEDICINE

## 2022-12-06 PROCEDURE — 1100F PTFALLS ASSESS-DOCD GE2>/YR: CPT | Mod: CPTII,S$GLB,, | Performed by: INTERNAL MEDICINE

## 2022-12-06 PROCEDURE — 3077F SYST BP >= 140 MM HG: CPT | Mod: CPTII,S$GLB,, | Performed by: INTERNAL MEDICINE

## 2022-12-06 PROCEDURE — 99214 PR OFFICE/OUTPT VISIT, EST, LEVL IV, 30-39 MIN: ICD-10-PCS | Mod: S$GLB,,, | Performed by: INTERNAL MEDICINE

## 2022-12-06 PROCEDURE — 1126F AMNT PAIN NOTED NONE PRSNT: CPT | Mod: CPTII,S$GLB,, | Performed by: INTERNAL MEDICINE

## 2022-12-06 PROCEDURE — 1100F PR PT FALLS ASSESS DOC 2+ FALLS/FALL W/INJURY/YR: ICD-10-PCS | Mod: CPTII,S$GLB,, | Performed by: INTERNAL MEDICINE

## 2022-12-06 PROCEDURE — 99214 OFFICE O/P EST MOD 30 MIN: CPT | Mod: S$GLB,,, | Performed by: INTERNAL MEDICINE

## 2022-12-06 PROCEDURE — 3288F PR FALLS RISK ASSESSMENT DOCUMENTED: ICD-10-PCS | Mod: CPTII,S$GLB,, | Performed by: INTERNAL MEDICINE

## 2022-12-06 PROCEDURE — 3077F PR MOST RECENT SYSTOLIC BLOOD PRESSURE >= 140 MM HG: ICD-10-PCS | Mod: CPTII,S$GLB,, | Performed by: INTERNAL MEDICINE

## 2022-12-06 PROCEDURE — 99999 PR PBB SHADOW E&M-EST. PATIENT-LVL III: CPT | Mod: PBBFAC,,, | Performed by: INTERNAL MEDICINE

## 2022-12-06 NOTE — PROGRESS NOTES
Cardiology    12/6/2022  1:57 PM    Problem list  Patient Active Problem List   Diagnosis    Asthma, chronic    Orthostatic hypotension    Hypertension    HLD (hyperlipidemia)    Micturition syncope    Vasovagal syncope    Parkinsonism    Memory change    Unintentional weight loss    Spondylosis of lumbar region without myelopathy or radiculopathy    Myofascial pain syndrome    Chronic bilateral low back pain without sciatica    Impaired mobility and activities of daily living    Assistance needed with transportation    Disorder of adrenal gland, unspecified    Chronic diastolic congestive heart failure    Chronic obstructive pulmonary disease, unspecified COPD type       CC:  F/u    HPI:  She is here for follow-up.  Since her last visit, she has lost 5 lb.  She did not take her amlodipine today.  She takes it intermittently.  Her systolic blood pressure range 70 to 150 at home.  She also reports shortness of breath.  She denies any paroxysmal nocturnal dyspnea.    Medications  Current Outpatient Medications   Medication Sig Dispense Refill    amLODIPine (NORVASC) 5 MG tablet Take 1 tablet (5 mg total) by mouth once daily. 90 tablet 0    atorvastatin (LIPITOR) 20 MG tablet TAKE 1 TABLET(20 MG) BY MOUTH EVERY DAY 30 tablet 0    carbidopa-levodopa  mg (SINEMET)  mg per tablet TAKE 1/2 TABLET BY MOUTH THREE TIMES DAILY 135 tablet 03    diaper,brief,adult,disposable Misc Use as dictioned 96 each 5    donepeziL (ARICEPT) 5 MG tablet Take 1 tablet (5 mg total) by mouth every evening. 90 tablet 1    fexofenadine (ALLEGRA) 60 MG tablet Take 60 mg by mouth once as needed.      loperamide (IMODIUM) 2 mg capsule TK ONE C PO  QID PRN  11    losartan (COZAAR) 25 MG tablet Take 1 tablet (25 mg total) by mouth once daily. 90 tablet 3    QUEtiapine (SEROQUEL) 25 MG Tab Take 1 tablet (25 mg total) by mouth every evening. Take this medication ~45 minutes prior to going to bed every night 30 tablet 1    ADVAIR  DISKUS 500-50 mcg/dose DsDv diskus inhaler Inhale 1 puff into the lungs 2 (two) times daily as needed.   3    ALBUTEROL SULFATE (VENTOLIN INHL) Inhale into the lungs every 4 (four) hours as needed.      azelastine (ASTELIN) 137 mcg (0.1 %) nasal spray SPRAY TWICE INTO EACH NOSTRIL ONCE  Q DAY  4    calcium-vitamin D 500-125 mg-unit tablet Take 1 tablet by mouth every other day.       cholecalciferol, vitamin D3, (VITAMIN D3) 25 mcg (1,000 unit) capsule Take 1,000 Units by mouth.      diclofenac sodium (VOLTAREN) 1 % Gel Apply 2 g topically 4 (four) times daily. (Patient not taking: Reported on 12/6/2022) 200 g 2    hydroCHLOROthiazide (HYDRODIURIL) 12.5 MG Tab Take 1 tablet (12.5 mg total) by mouth once daily. (Patient not taking: Reported on 12/6/2022) 90 tablet 3    ipratropium (ATROVENT) 0.06 % nasal spray As needed  4    omega-3s-dha-epa-fish oil 200 mg-300 mg- 100 mg-1,000 mg Cap Take by mouth.      pneumoc 20-chante conj-dip cr,PF, (PREVNAR 20, PF,) 0.5 mL Syrg injection Inject into the muscle. (Patient not taking: Reported on 12/6/2022) 0.5 mL 0     No current facility-administered medications for this visit.      Prior to Admission medications    Medication Sig Start Date End Date Taking? Authorizing Provider   amLODIPine (NORVASC) 5 MG tablet Take 1 tablet (5 mg total) by mouth once daily. 11/16/22  Yes Aidee Barbosa MD   atorvastatin (LIPITOR) 20 MG tablet TAKE 1 TABLET(20 MG) BY MOUTH EVERY DAY 12/8/19  Yes Karen Espinosa MD   carbidopa-levodopa  mg (SINEMET)  mg per tablet TAKE 1/2 TABLET BY MOUTH THREE TIMES DAILY 10/14/22  Yes Jana Vasquez MD   diaper,brief,adult,disposable Misc Use as dictioned 7/29/22  Yes Aidee Barbosa MD   donepeziL (ARICEPT) 5 MG tablet Take 1 tablet (5 mg total) by mouth every evening. 9/14/22  Yes Aidee Barbosa MD   fexofenadine (ALLEGRA) 60 MG tablet Take 60 mg by mouth once as needed.   Yes Historical Provider   loperamide  (IMODIUM) 2 mg capsule TK ONE C PO  QID PRN 9/5/19  Yes Historical Provider   losartan (COZAAR) 25 MG tablet Take 1 tablet (25 mg total) by mouth once daily. 8/25/22 8/25/23 Yes Glenda Alex NP   QUEtiapine (SEROQUEL) 25 MG Tab Take 1 tablet (25 mg total) by mouth every evening. Take this medication ~45 minutes prior to going to bed every night 9/13/22 9/13/23 Yes Demond Hernández MD   ADVAIR DISKUS 500-50 mcg/dose DsDv diskus inhaler Inhale 1 puff into the lungs 2 (two) times daily as needed.  2/10/17   Historical Provider   ALBUTEROL SULFATE (VENTOLIN INHL) Inhale into the lungs every 4 (four) hours as needed.    Historical Provider   azelastine (ASTELIN) 137 mcg (0.1 %) nasal spray SPRAY TWICE INTO EACH NOSTRIL ONCE  Q DAY 2/27/17   Historical Provider   calcium-vitamin D 500-125 mg-unit tablet Take 1 tablet by mouth every other day.     Historical Provider   cholecalciferol, vitamin D3, (VITAMIN D3) 25 mcg (1,000 unit) capsule Take 1,000 Units by mouth.    Historical Provider   diclofenac sodium (VOLTAREN) 1 % Gel Apply 2 g topically 4 (four) times daily.  Patient not taking: Reported on 12/6/2022 8/6/21   Desiree Weaver Jr., MD   hydroCHLOROthiazide (HYDRODIURIL) 12.5 MG Tab Take 1 tablet (12.5 mg total) by mouth once daily.  Patient not taking: Reported on 12/6/2022 9/7/22 9/7/23  Fabien Lora MD   ipratropium (ATROVENT) 0.06 % nasal spray As needed 2/27/17   Historical Provider   omega-3s-dha-epa-fish oil 200 mg-300 mg- 100 mg-1,000 mg Cap Take by mouth.    Historical Provider   pneumoc 20-chante conj-dip cr,PF, (PREVNAR 20, PF,) 0.5 mL Syrg injection Inject into the muscle.  Patient not taking: Reported on 12/6/2022 7/21/22   Jaida Navarrete PharmD         History  Past Medical History:   Diagnosis Date    Arthritis     Asthma, chronic     Cataract     Hypertension     Orthostatic hypotension      Past Surgical History:   Procedure Laterality Date    CATARACT EXTRACTION      CATARACT EXTRACTION,  BILATERAL      PARTIAL HYSTERECTOMY  1981     Social History     Socioeconomic History    Marital status:    Tobacco Use    Smoking status: Never    Smokeless tobacco: Never   Substance and Sexual Activity    Alcohol use: No    Drug use: No   Social History Narrative    Retired .         Allergies  Review of patient's allergies indicates:   Allergen Reactions    Lisinopril Shortness Of Breath, Diarrhea and Nausea Only     All side affects from medication    Aspirin Nausea And Vomiting    Beef containing products     Pork/porcine containing products     Statins-hmg-coa reductase inhibitors Diarrhea and Nausea And Vomiting    Toprol xl [metoprolol succinate] Other (See Comments)     Low blood preasure    Hydralazine-reserpin-hcthiazid Diarrhea         Review of Systems   Review of Systems   Constitutional: Positive for decreased appetite, malaise/fatigue and weight loss.   Cardiovascular: Negative.    Respiratory:  Positive for shortness of breath. Negative for cough, hemoptysis, sleep disturbances due to breathing, snoring, sputum production and wheezing.    Gastrointestinal:  Negative for hematemesis, hematochezia and melena.   All other systems reviewed and are negative.      Physical Exam  Wt Readings from Last 1 Encounters:   12/06/22 47.3 kg (104 lb 6.2 oz)     BP Readings from Last 3 Encounters:   12/06/22 (!) 154/108   09/07/22 (!) 184/96   08/25/22 (!) 204/110     Pulse Readings from Last 1 Encounters:   12/06/22 70     Body mass index is 17.92 kg/m².    Physical Exam  Vitals reviewed.   Constitutional:       General: She is not in acute distress.     Appearance: She is underweight.   Neck:      Vascular: No JVD.   Cardiovascular:      Rate and Rhythm: Normal rate and regular rhythm.      Heart sounds: S1 normal and S2 normal.   Pulmonary:      Breath sounds: Normal breath sounds and air entry.           Assessment  1. Mixed hyperlipidemia  Stable    2. Chronic diastolic congestive heart  failure  Being assessed  - Echo; Future    3. Vasovagal syncope  Stable    4. Primary hypertension  On medication.  Allow for supine hypertension in order to not risking hypotensive episode.        Plan and Discussion  Continue current medication.  Discussed that will allow her pressure to run a little high in order to not run the risk of drop her pressure too low.  Will get echo to assess her dyspnea and pericardial effusion.    Follow Up  2 months      Adam Javier MD, F.A.C.C, F.S.C.A.I.        35 minutes were spent in chart review, documentation and review of results, and evaluation, treatment, and counseling of patient on the same day of service.

## 2022-12-13 ENCOUNTER — TELEPHONE (OUTPATIENT)
Dept: NEPHROLOGY | Facility: CLINIC | Age: 77
End: 2022-12-13
Payer: MEDICARE

## 2022-12-13 NOTE — TELEPHONE ENCOUNTER
I called pt left and voicemail stating that her apt is cancel we call her back to get her reschedule at later time.

## 2022-12-15 ENCOUNTER — PATIENT MESSAGE (OUTPATIENT)
Dept: NEPHROLOGY | Facility: CLINIC | Age: 77
End: 2022-12-15
Payer: MEDICARE

## 2022-12-19 ENCOUNTER — CLINICAL SUPPORT (OUTPATIENT)
Dept: PSYCHIATRY | Facility: CLINIC | Age: 77
End: 2022-12-19
Payer: MEDICARE

## 2022-12-19 VITALS
DIASTOLIC BLOOD PRESSURE: 83 MMHG | BODY MASS INDEX: 18.22 KG/M2 | HEART RATE: 69 BPM | SYSTOLIC BLOOD PRESSURE: 174 MMHG | WEIGHT: 106.13 LBS

## 2022-12-19 DIAGNOSIS — R41.3 MEMORY CHANGES: ICD-10-CM

## 2022-12-19 DIAGNOSIS — G20.A1 PARKINSON'S DISEASE: Primary | ICD-10-CM

## 2022-12-19 DIAGNOSIS — F05 SUNDOWNING: ICD-10-CM

## 2022-12-19 PROCEDURE — 99999 PR PBB SHADOW E&M-EST. PATIENT-LVL II: ICD-10-PCS | Mod: PBBFAC,,,

## 2022-12-19 PROCEDURE — 99999 PR PBB SHADOW E&M-EST. PATIENT-LVL II: CPT | Mod: PBBFAC,,,

## 2022-12-19 PROCEDURE — 99214 OFFICE O/P EST MOD 30 MIN: CPT | Mod: S$GLB,,, | Performed by: PSYCHIATRY & NEUROLOGY

## 2022-12-19 PROCEDURE — 99214 PR OFFICE/OUTPT VISIT, EST, LEVL IV, 30-39 MIN: ICD-10-PCS | Mod: S$GLB,,, | Performed by: PSYCHIATRY & NEUROLOGY

## 2022-12-19 RX ORDER — QUETIAPINE FUMARATE 25 MG/1
25 TABLET, FILM COATED ORAL NIGHTLY
Qty: 90 TABLET | Refills: 1 | Status: SHIPPED | OUTPATIENT
Start: 2022-12-19 | End: 2023-07-03 | Stop reason: SDUPTHER
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
## 2023-01-30 ENCOUNTER — PATIENT MESSAGE (OUTPATIENT)
Dept: RESEARCH | Facility: HOSPITAL | Age: 78
End: 2023-01-30
Payer: MEDICARE

## 2023-02-24 ENCOUNTER — TELEPHONE (OUTPATIENT)
Dept: NEPHROLOGY | Facility: CLINIC | Age: 78
End: 2023-02-24
Payer: MEDICARE

## 2023-02-24 DIAGNOSIS — N18.32 STAGE 3B CHRONIC KIDNEY DISEASE: Primary | ICD-10-CM

## 2023-02-25 ENCOUNTER — PATIENT MESSAGE (OUTPATIENT)
Dept: NEPHROLOGY | Facility: CLINIC | Age: 78
End: 2023-02-25
Payer: MEDICARE

## 2023-04-03 ENCOUNTER — LAB VISIT (OUTPATIENT)
Dept: LAB | Facility: HOSPITAL | Age: 78
End: 2023-04-03
Attending: INTERNAL MEDICINE
Payer: MEDICARE

## 2023-04-03 DIAGNOSIS — N18.32 STAGE 3B CHRONIC KIDNEY DISEASE: ICD-10-CM

## 2023-04-03 LAB
ANION GAP SERPL CALC-SCNC: 11 MMOL/L (ref 8–16)
BASOPHILS # BLD AUTO: 0.05 K/UL (ref 0–0.2)
BASOPHILS NFR BLD: 0.8 % (ref 0–1.9)
BUN SERPL-MCNC: 34 MG/DL (ref 8–23)
CALCIUM SERPL-MCNC: 9.8 MG/DL (ref 8.7–10.5)
CHLORIDE SERPL-SCNC: 105 MMOL/L (ref 95–110)
CO2 SERPL-SCNC: 24 MMOL/L (ref 23–29)
CREAT SERPL-MCNC: 1.9 MG/DL (ref 0.5–1.4)
DIFFERENTIAL METHOD: ABNORMAL
EOSINOPHIL # BLD AUTO: 0.1 K/UL (ref 0–0.5)
EOSINOPHIL NFR BLD: 1.3 % (ref 0–8)
ERYTHROCYTE [DISTWIDTH] IN BLOOD BY AUTOMATED COUNT: 14.1 % (ref 11.5–14.5)
EST. GFR  (NO RACE VARIABLE): 26.5 ML/MIN/1.73 M^2
GLUCOSE SERPL-MCNC: 95 MG/DL (ref 70–110)
HCT VFR BLD AUTO: 36.8 % (ref 37–48.5)
HGB BLD-MCNC: 11.6 G/DL (ref 12–16)
IMM GRANULOCYTES # BLD AUTO: 0.01 K/UL (ref 0–0.04)
IMM GRANULOCYTES NFR BLD AUTO: 0.2 % (ref 0–0.5)
LYMPHOCYTES # BLD AUTO: 2.4 K/UL (ref 1–4.8)
LYMPHOCYTES NFR BLD: 37.9 % (ref 18–48)
MCH RBC QN AUTO: 30.3 PG (ref 27–31)
MCHC RBC AUTO-ENTMCNC: 31.5 G/DL (ref 32–36)
MCV RBC AUTO: 96 FL (ref 82–98)
MONOCYTES # BLD AUTO: 0.6 K/UL (ref 0.3–1)
MONOCYTES NFR BLD: 9.4 % (ref 4–15)
NEUTROPHILS # BLD AUTO: 3.1 K/UL (ref 1.8–7.7)
NEUTROPHILS NFR BLD: 50.4 % (ref 38–73)
NRBC BLD-RTO: 0 /100 WBC
PLATELET # BLD AUTO: 242 K/UL (ref 150–450)
PMV BLD AUTO: 10.5 FL (ref 9.2–12.9)
POTASSIUM SERPL-SCNC: 4.4 MMOL/L (ref 3.5–5.1)
RBC # BLD AUTO: 3.83 M/UL (ref 4–5.4)
SODIUM SERPL-SCNC: 140 MMOL/L (ref 136–145)
WBC # BLD AUTO: 6.2 K/UL (ref 3.9–12.7)

## 2023-04-03 PROCEDURE — 36415 COLL VENOUS BLD VENIPUNCTURE: CPT | Mod: PN | Performed by: INTERNAL MEDICINE

## 2023-04-03 PROCEDURE — 85025 COMPLETE CBC W/AUTO DIFF WBC: CPT | Performed by: INTERNAL MEDICINE

## 2023-04-03 PROCEDURE — 80048 BASIC METABOLIC PNL TOTAL CA: CPT | Performed by: INTERNAL MEDICINE

## 2023-04-04 ENCOUNTER — LAB VISIT (OUTPATIENT)
Dept: LAB | Facility: HOSPITAL | Age: 78
End: 2023-04-04
Attending: INTERNAL MEDICINE
Payer: MEDICARE

## 2023-04-04 DIAGNOSIS — N18.32 STAGE 3B CHRONIC KIDNEY DISEASE: ICD-10-CM

## 2023-04-04 LAB
BACTERIA #/AREA URNS AUTO: ABNORMAL /HPF
BILIRUB UR QL STRIP: NEGATIVE
CLARITY UR REFRACT.AUTO: ABNORMAL
COLOR UR AUTO: ABNORMAL
CREAT UR-MCNC: 331 MG/DL (ref 15–325)
GLUCOSE UR QL STRIP: NEGATIVE
HGB UR QL STRIP: NEGATIVE
HYALINE CASTS UR QL AUTO: 20 /LPF
KETONES UR QL STRIP: ABNORMAL
LEUKOCYTE ESTERASE UR QL STRIP: ABNORMAL
MICROSCOPIC COMMENT: ABNORMAL
NITRITE UR QL STRIP: NEGATIVE
NON-SQ EPI CELLS #/AREA URNS AUTO: 5 /HPF
PH UR STRIP: 5 [PH] (ref 5–8)
PROT UR QL STRIP: ABNORMAL
PROT UR-MCNC: 48 MG/DL (ref 0–15)
PROT/CREAT UR: 0.15 MG/G{CREAT} (ref 0–0.2)
RBC #/AREA URNS AUTO: 19 /HPF (ref 0–4)
SP GR UR STRIP: 1.02 (ref 1–1.03)
SQUAMOUS #/AREA URNS AUTO: 15 /HPF
URN SPEC COLLECT METH UR: ABNORMAL
WBC #/AREA URNS AUTO: >100 /HPF (ref 0–5)

## 2023-04-04 PROCEDURE — 84156 ASSAY OF PROTEIN URINE: CPT | Performed by: INTERNAL MEDICINE

## 2023-04-04 PROCEDURE — 81001 URINALYSIS AUTO W/SCOPE: CPT | Performed by: INTERNAL MEDICINE

## 2023-04-05 ENCOUNTER — PES CALL (OUTPATIENT)
Dept: ADMINISTRATIVE | Facility: CLINIC | Age: 78
End: 2023-04-05
Payer: MEDICARE

## 2023-04-06 ENCOUNTER — PATIENT MESSAGE (OUTPATIENT)
Dept: PRIMARY CARE CLINIC | Facility: CLINIC | Age: 78
End: 2023-04-06
Payer: MEDICARE

## 2023-04-06 ENCOUNTER — OFFICE VISIT (OUTPATIENT)
Dept: NEPHROLOGY | Facility: CLINIC | Age: 78
End: 2023-04-06
Payer: MEDICARE

## 2023-04-06 VITALS
WEIGHT: 101.19 LBS | SYSTOLIC BLOOD PRESSURE: 186 MMHG | DIASTOLIC BLOOD PRESSURE: 96 MMHG | BODY MASS INDEX: 17.37 KG/M2 | HEART RATE: 62 BPM

## 2023-04-06 DIAGNOSIS — N18.4 CKD (CHRONIC KIDNEY DISEASE) STAGE 4, GFR 15-29 ML/MIN: Primary | ICD-10-CM

## 2023-04-06 DIAGNOSIS — R82.81 PYURIA: ICD-10-CM

## 2023-04-06 DIAGNOSIS — I10 HYPERTENSION, UNSPECIFIED TYPE: ICD-10-CM

## 2023-04-06 DIAGNOSIS — I10 WHITE COAT SYNDROME WITH HYPERTENSION: ICD-10-CM

## 2023-04-06 PROCEDURE — 99213 OFFICE O/P EST LOW 20 MIN: CPT | Mod: S$GLB,,, | Performed by: INTERNAL MEDICINE

## 2023-04-06 PROCEDURE — 3080F PR MOST RECENT DIASTOLIC BLOOD PRESSURE >= 90 MM HG: ICD-10-PCS | Mod: CPTII,S$GLB,, | Performed by: INTERNAL MEDICINE

## 2023-04-06 PROCEDURE — 3080F DIAST BP >= 90 MM HG: CPT | Mod: CPTII,S$GLB,, | Performed by: INTERNAL MEDICINE

## 2023-04-06 PROCEDURE — 99213 PR OFFICE/OUTPT VISIT, EST, LEVL III, 20-29 MIN: ICD-10-PCS | Mod: S$GLB,,, | Performed by: INTERNAL MEDICINE

## 2023-04-06 PROCEDURE — 1126F AMNT PAIN NOTED NONE PRSNT: CPT | Mod: CPTII,S$GLB,, | Performed by: INTERNAL MEDICINE

## 2023-04-06 PROCEDURE — 1101F PT FALLS ASSESS-DOCD LE1/YR: CPT | Mod: CPTII,S$GLB,, | Performed by: INTERNAL MEDICINE

## 2023-04-06 PROCEDURE — 3077F SYST BP >= 140 MM HG: CPT | Mod: CPTII,S$GLB,, | Performed by: INTERNAL MEDICINE

## 2023-04-06 PROCEDURE — 1126F PR PAIN SEVERITY QUANTIFIED, NO PAIN PRESENT: ICD-10-PCS | Mod: CPTII,S$GLB,, | Performed by: INTERNAL MEDICINE

## 2023-04-06 PROCEDURE — 99999 PR PBB SHADOW E&M-EST. PATIENT-LVL III: ICD-10-PCS | Mod: PBBFAC,,, | Performed by: INTERNAL MEDICINE

## 2023-04-06 PROCEDURE — 3288F PR FALLS RISK ASSESSMENT DOCUMENTED: ICD-10-PCS | Mod: CPTII,S$GLB,, | Performed by: INTERNAL MEDICINE

## 2023-04-06 PROCEDURE — 3288F FALL RISK ASSESSMENT DOCD: CPT | Mod: CPTII,S$GLB,, | Performed by: INTERNAL MEDICINE

## 2023-04-06 PROCEDURE — 99999 PR PBB SHADOW E&M-EST. PATIENT-LVL III: CPT | Mod: PBBFAC,,, | Performed by: INTERNAL MEDICINE

## 2023-04-06 PROCEDURE — 1101F PR PT FALLS ASSESS DOC 0-1 FALLS W/OUT INJ PAST YR: ICD-10-PCS | Mod: CPTII,S$GLB,, | Performed by: INTERNAL MEDICINE

## 2023-04-06 PROCEDURE — 3077F PR MOST RECENT SYSTOLIC BLOOD PRESSURE >= 140 MM HG: ICD-10-PCS | Mod: CPTII,S$GLB,, | Performed by: INTERNAL MEDICINE

## 2023-04-06 RX ORDER — CEFDINIR 300 MG/1
300 CAPSULE ORAL 2 TIMES DAILY
Qty: 20 CAPSULE | Refills: 0 | Status: SHIPPED | OUTPATIENT
Start: 2023-04-06 | End: 2023-04-16

## 2023-04-18 ENCOUNTER — TELEPHONE (OUTPATIENT)
Dept: PRIMARY CARE CLINIC | Facility: CLINIC | Age: 78
End: 2023-04-18
Payer: MEDICARE

## 2023-04-18 NOTE — TELEPHONE ENCOUNTER
----- Message from Glenda Madrid sent at 4/18/2023  8:32 AM CDT -----  Contact: Jenn/Elijah/[phone]  Patient daughter called, requested a courtesy call from Dr Ag's nurse in regards patient had a fall last week has been in the [facility] facility, she may need long time care. Also want to follow up on paper work. Please call and advise. Thank you

## 2023-04-18 NOTE — TELEPHONE ENCOUNTER
Pt's daughter state her mom is at [facility] [place] she is being discharged around 3pm today, pt's daughter is asking about a rehab facility I advised her she can go back to the hospital and request to speak with a  to get resources

## 2023-04-20 ENCOUNTER — PATIENT MESSAGE (OUTPATIENT)
Dept: PRIMARY CARE CLINIC | Facility: CLINIC | Age: 78
End: 2023-04-20
Payer: MEDICARE

## 2023-04-21 ENCOUNTER — TELEPHONE (OUTPATIENT)
Dept: PRIMARY CARE CLINIC | Facility: CLINIC | Age: 78
End: 2023-04-21

## 2023-04-21 NOTE — TELEPHONE ENCOUNTER
----- Message from Olga Chow sent at 4/21/2023  4:36 PM CDT -----  Regarding: Patient's Daughter Wants a Second Opinion  Giles Ms. Espinoza's daughter is here to get a second opinion on the care her mother is receiving. She says she has been calling and messaging the staff for Dr. Barbosa but isn't getting any responses back. Please give her a call at [phone]. Thank you.

## 2023-04-21 NOTE — TELEPHONE ENCOUNTER
"----- Message from Ritika Cantor sent at 4/21/2023  3:31 PM CDT -----  Contact: pt [phone]  Patient daughter states patient has fallen 4 times since April 13, 2023. Daughter states Samy doss states she is "too much" patient is hallucination and keeping roommate awake during the night.  Please call and advise.    Thank you and have a great day.      "

## 2023-04-21 NOTE — TELEPHONE ENCOUNTER
Pt's daughter advised I called Multiple times I spoke to her while her mother was in the hospital as well she is requesting to have FMLA forms complete. Jenn state the form was due in a week. I asked her about seeing if the hospital doctor can fill the form out if not let me know

## 2023-04-24 ENCOUNTER — PES CALL (OUTPATIENT)
Dept: ADMINISTRATIVE | Facility: CLINIC | Age: 78
End: 2023-04-24
Payer: MEDICARE

## 2023-04-24 ENCOUNTER — PATIENT MESSAGE (OUTPATIENT)
Dept: PRIMARY CARE CLINIC | Facility: CLINIC | Age: 78
End: 2023-04-24
Payer: MEDICARE

## 2023-04-24 NOTE — TELEPHONE ENCOUNTER
Pt is admitted to [facility] in [facility] pt's Heladio and daughter Jenn was advised PCP will need to complete FMLA forms both sets of forms will be sent.  Heladio have a new set of forms that need to be filled out

## 2023-04-25 ENCOUNTER — PATIENT MESSAGE (OUTPATIENT)
Dept: PRIMARY CARE CLINIC | Facility: CLINIC | Age: 78
End: 2023-04-25
Payer: MEDICARE

## 2023-04-27 ENCOUNTER — TELEPHONE (OUTPATIENT)
Dept: PRIMARY CARE CLINIC | Facility: CLINIC | Age: 78
End: 2023-04-27
Payer: MEDICARE

## 2023-05-01 NOTE — TELEPHONE ENCOUNTER
Addressed via phone message. Spoke with  Heladio to iform forms are ready for . He stated he will be here to pick them up.

## 2023-05-08 ENCOUNTER — PATIENT MESSAGE (OUTPATIENT)
Dept: PRIMARY CARE CLINIC | Facility: CLINIC | Age: 78
End: 2023-05-08
Payer: MEDICARE

## 2023-05-08 ENCOUNTER — PATIENT MESSAGE (OUTPATIENT)
Dept: CARDIOLOGY | Facility: CLINIC | Age: 78
End: 2023-05-08
Payer: MEDICARE

## 2023-05-09 ENCOUNTER — PATIENT MESSAGE (OUTPATIENT)
Dept: PRIMARY CARE CLINIC | Facility: CLINIC | Age: 78
End: 2023-05-09
Payer: MEDICARE

## 2023-05-29 ENCOUNTER — TELEPHONE (OUTPATIENT)
Dept: PRIMARY CARE CLINIC | Facility: CLINIC | Age: 78
End: 2023-05-29
Payer: MEDICARE

## 2023-05-29 RX ORDER — ACETAMINOPHEN 325 MG/1
TABLET ORAL
COMMUNITY
Start: 2023-04-22 | End: 2023-05-30

## 2023-05-29 RX ORDER — TRAZODONE HYDROCHLORIDE 50 MG/1
TABLET ORAL
COMMUNITY
Start: 2023-04-22

## 2023-05-29 RX ORDER — ESCITALOPRAM OXALATE 10 MG/1
10 TABLET ORAL
COMMUNITY
Start: 2023-05-24 | End: 2023-06-27 | Stop reason: SDUPTHER

## 2023-05-29 RX ORDER — MULTIVITAMIN
1 TABLET ORAL
COMMUNITY
Start: 2023-04-23

## 2023-05-29 RX ORDER — PANTOPRAZOLE SODIUM 40 MG/1
40 TABLET, DELAYED RELEASE ORAL
COMMUNITY
Start: 2023-05-24

## 2023-05-29 RX ORDER — NITROFURANTOIN 25; 75 MG/1; MG/1
100 CAPSULE ORAL 2 TIMES DAILY
COMMUNITY
Start: 2023-04-28 | End: 2023-05-30 | Stop reason: ALTCHOICE

## 2023-05-29 RX ORDER — IBUPROFEN 200 MG
1 TABLET ORAL
COMMUNITY
Start: 2023-04-22

## 2023-05-29 RX ORDER — ALUMINUM HYDROXIDE, MAGNESIUM HYDROXIDE, DIMETHICONE 200; 200; 20 MG/5ML; MG/5ML; MG/5ML
SUSPENSION ORAL
COMMUNITY
Start: 2023-04-22

## 2023-05-29 RX ORDER — OMEGA-3-ACID ETHYL ESTERS 1 G/1
2 CAPSULE, LIQUID FILLED ORAL 2 TIMES DAILY
COMMUNITY
Start: 2023-05-05

## 2023-05-29 RX ORDER — MEDICAL SUPPLY, MISCELLANEOUS
MISCELLANEOUS MISCELLANEOUS
COMMUNITY
Start: 2023-04-19

## 2023-05-29 RX ORDER — CLONIDINE HYDROCHLORIDE 0.1 MG/1
TABLET ORAL
COMMUNITY
Start: 2023-04-22

## 2023-05-29 RX ORDER — HYDROXYZINE PAMOATE 50 MG/1
50 CAPSULE ORAL EVERY 6 HOURS PRN
COMMUNITY
Start: 2023-04-22

## 2023-05-29 RX ORDER — ONDANSETRON 4 MG/1
TABLET, ORALLY DISINTEGRATING ORAL
COMMUNITY
Start: 2023-04-22 | End: 2023-05-30 | Stop reason: ALTCHOICE

## 2023-05-29 RX ORDER — ASCORBIC ACID 1000 MG
TABLET ORAL
COMMUNITY
Start: 2023-04-22

## 2023-05-29 RX ORDER — IBUPROFEN 200 MG
TABLET ORAL
COMMUNITY
Start: 2023-04-22

## 2023-05-29 RX ORDER — AMLODIPINE BESYLATE 10 MG/1
10 TABLET ORAL DAILY
COMMUNITY
Start: 2023-05-24 | End: 2023-06-27 | Stop reason: SDUPTHER

## 2023-05-29 NOTE — TELEPHONE ENCOUNTER
----- Message from Lacie Ambriz sent at 5/29/2023  9:21 AM CDT -----  Contact: Katharine Garcia/Brynn [phone]  Prescription Request:     Name of medication: pain meds    Reason for request: chronic lower back pain    Pharmacy:   [facility] [address]  [address]  [address]  [place] [address]  Phone: [phone] Fax: [phone]    Thank You

## 2023-05-29 NOTE — TELEPHONE ENCOUNTER
----- Message from Lacie Ambriz sent at 5/29/2023  9:21 AM CDT -----  Contact: Katharine Garcia/Brynn [phone]  Prescription Request:     Name of medication: pain meds    Reason for request: chronic lower back pain    Pharmacy:   [place] [facility] [address] [address]  [address]  [address]  [place] [address]  Phone: [phone] Fax: [phone]    Thank You

## 2023-05-29 NOTE — TELEPHONE ENCOUNTER
LVM For Pt Daughter Jenn That Pt will need a Appt for any pain Meds    2 phone call   Spoke With Pt Son Heladio to let him know that Pt will need to be send by a Doctor For chronic lower back pain  Pt son is calling [facility] To see why his Mrs. Correa can't see Doctor at [facility].

## 2023-05-30 ENCOUNTER — HOSPITAL ENCOUNTER (OUTPATIENT)
Dept: RADIOLOGY | Facility: HOSPITAL | Age: 78
Discharge: HOME | End: 2023-05-30
Attending: STUDENT IN AN ORGANIZED HEALTH CARE EDUCATION/TRAINING PROGRAM
Payer: MEDICARE

## 2023-05-30 ENCOUNTER — TELEPHONE (OUTPATIENT)
Dept: PRIMARY CARE CLINIC | Facility: CLINIC | Age: 78
End: 2023-05-30
Payer: MEDICARE

## 2023-05-30 ENCOUNTER — TELEPHONE (OUTPATIENT)
Dept: PRIMARY CARE CLINIC | Facility: CLINIC | Age: 78
End: 2023-05-30

## 2023-05-30 ENCOUNTER — OFFICE VISIT (OUTPATIENT)
Dept: PRIMARY CARE CLINIC | Facility: CLINIC | Age: 78
End: 2023-05-30
Payer: MEDICARE

## 2023-05-30 VITALS
OXYGEN SATURATION: 99 % | SYSTOLIC BLOOD PRESSURE: 126 MMHG | RESPIRATION RATE: 20 BRPM | HEART RATE: 65 BPM | DIASTOLIC BLOOD PRESSURE: 80 MMHG | HEIGHT: 64 IN | BODY MASS INDEX: 19.23 KG/M2 | TEMPERATURE: 98 F | WEIGHT: 112.63 LBS

## 2023-05-30 DIAGNOSIS — M25.551 RIGHT HIP PAIN: ICD-10-CM

## 2023-05-30 DIAGNOSIS — G89.29 CHRONIC RIGHT-SIDED LOW BACK PAIN WITH RIGHT-SIDED SCIATICA: ICD-10-CM

## 2023-05-30 DIAGNOSIS — M54.50 CHRONIC BILATERAL LOW BACK PAIN WITHOUT SCIATICA: ICD-10-CM

## 2023-05-30 DIAGNOSIS — G89.29 CHRONIC BILATERAL LOW BACK PAIN WITHOUT SCIATICA: ICD-10-CM

## 2023-05-30 DIAGNOSIS — N30.00 ACUTE CYSTITIS WITHOUT HEMATURIA: ICD-10-CM

## 2023-05-30 DIAGNOSIS — M54.41 CHRONIC RIGHT-SIDED LOW BACK PAIN WITH RIGHT-SIDED SCIATICA: Primary | ICD-10-CM

## 2023-05-30 DIAGNOSIS — M54.41 CHRONIC RIGHT-SIDED LOW BACK PAIN WITH RIGHT-SIDED SCIATICA: ICD-10-CM

## 2023-05-30 DIAGNOSIS — G89.29 CHRONIC RIGHT-SIDED LOW BACK PAIN WITH RIGHT-SIDED SCIATICA: Primary | ICD-10-CM

## 2023-05-30 PROBLEM — R82.81 PYURIA: Status: ACTIVE | Noted: 2023-05-30

## 2023-05-30 LAB
BACTERIA #/AREA URNS AUTO: ABNORMAL /HPF
BILIRUB UR QL STRIP: NEGATIVE
CLARITY UR REFRACT.AUTO: ABNORMAL
COLOR UR AUTO: YELLOW
GLUCOSE UR QL STRIP: NEGATIVE
HGB UR QL STRIP: NEGATIVE
HYALINE CASTS UR QL AUTO: 92 /LPF
KETONES UR QL STRIP: ABNORMAL
LEUKOCYTE ESTERASE UR QL STRIP: ABNORMAL
MICROSCOPIC COMMENT: ABNORMAL
NITRITE UR QL STRIP: NEGATIVE
PH UR STRIP: 5 [PH] (ref 5–8)
PROT UR QL STRIP: ABNORMAL
RBC #/AREA URNS AUTO: 3 /HPF (ref 0–4)
SP GR UR STRIP: 1.02 (ref 1–1.03)
SQUAMOUS #/AREA URNS AUTO: 4 /HPF
URN SPEC COLLECT METH UR: ABNORMAL
WBC #/AREA URNS AUTO: >100 /HPF (ref 0–5)

## 2023-05-30 PROCEDURE — 1125F AMNT PAIN NOTED PAIN PRSNT: CPT | Mod: CPTII,S$GLB,, | Performed by: STUDENT IN AN ORGANIZED HEALTH CARE EDUCATION/TRAINING PROGRAM

## 2023-05-30 PROCEDURE — 81001 URINALYSIS AUTO W/SCOPE: CPT | Performed by: STUDENT IN AN ORGANIZED HEALTH CARE EDUCATION/TRAINING PROGRAM

## 2023-05-30 PROCEDURE — 99214 OFFICE O/P EST MOD 30 MIN: CPT | Mod: S$GLB,,, | Performed by: STUDENT IN AN ORGANIZED HEALTH CARE EDUCATION/TRAINING PROGRAM

## 2023-05-30 PROCEDURE — 3288F FALL RISK ASSESSMENT DOCD: CPT | Mod: CPTII,S$GLB,, | Performed by: STUDENT IN AN ORGANIZED HEALTH CARE EDUCATION/TRAINING PROGRAM

## 2023-05-30 PROCEDURE — 73502 XR HIP WITH PELVIS WHEN PERFORMED, 2 OR 3  VIEWS RIGHT: ICD-10-PCS | Mod: 26,RT,, | Performed by: RADIOLOGY

## 2023-05-30 PROCEDURE — 73502 X-RAY EXAM HIP UNI 2-3 VIEWS: CPT | Mod: TC,PN,RT

## 2023-05-30 PROCEDURE — 3288F PR FALLS RISK ASSESSMENT DOCUMENTED: ICD-10-PCS | Mod: CPTII,S$GLB,, | Performed by: STUDENT IN AN ORGANIZED HEALTH CARE EDUCATION/TRAINING PROGRAM

## 2023-05-30 PROCEDURE — 87077 CULTURE AEROBIC IDENTIFY: CPT | Performed by: STUDENT IN AN ORGANIZED HEALTH CARE EDUCATION/TRAINING PROGRAM

## 2023-05-30 PROCEDURE — 87088 URINE BACTERIA CULTURE: CPT | Performed by: STUDENT IN AN ORGANIZED HEALTH CARE EDUCATION/TRAINING PROGRAM

## 2023-05-30 PROCEDURE — 1159F MED LIST DOCD IN RCRD: CPT | Mod: CPTII,S$GLB,, | Performed by: STUDENT IN AN ORGANIZED HEALTH CARE EDUCATION/TRAINING PROGRAM

## 2023-05-30 PROCEDURE — 1100F PTFALLS ASSESS-DOCD GE2>/YR: CPT | Mod: CPTII,S$GLB,, | Performed by: STUDENT IN AN ORGANIZED HEALTH CARE EDUCATION/TRAINING PROGRAM

## 2023-05-30 PROCEDURE — 87086 URINE CULTURE/COLONY COUNT: CPT | Performed by: STUDENT IN AN ORGANIZED HEALTH CARE EDUCATION/TRAINING PROGRAM

## 2023-05-30 PROCEDURE — 99999 PR PBB SHADOW E&M-EST. PATIENT-LVL V: CPT | Mod: PBBFAC,,, | Performed by: STUDENT IN AN ORGANIZED HEALTH CARE EDUCATION/TRAINING PROGRAM

## 2023-05-30 PROCEDURE — 99999 PR PBB SHADOW E&M-EST. PATIENT-LVL V: ICD-10-PCS | Mod: PBBFAC,,, | Performed by: STUDENT IN AN ORGANIZED HEALTH CARE EDUCATION/TRAINING PROGRAM

## 2023-05-30 PROCEDURE — 3079F DIAST BP 80-89 MM HG: CPT | Mod: CPTII,S$GLB,, | Performed by: STUDENT IN AN ORGANIZED HEALTH CARE EDUCATION/TRAINING PROGRAM

## 2023-05-30 PROCEDURE — 87186 SC STD MICRODIL/AGAR DIL: CPT | Performed by: STUDENT IN AN ORGANIZED HEALTH CARE EDUCATION/TRAINING PROGRAM

## 2023-05-30 PROCEDURE — 73502 X-RAY EXAM HIP UNI 2-3 VIEWS: CPT | Mod: 26,RT,, | Performed by: RADIOLOGY

## 2023-05-30 PROCEDURE — 99214 PR OFFICE/OUTPT VISIT, EST, LEVL IV, 30-39 MIN: ICD-10-PCS | Mod: S$GLB,,, | Performed by: STUDENT IN AN ORGANIZED HEALTH CARE EDUCATION/TRAINING PROGRAM

## 2023-05-30 PROCEDURE — 1125F PR PAIN SEVERITY QUANTIFIED, PAIN PRESENT: ICD-10-PCS | Mod: CPTII,S$GLB,, | Performed by: STUDENT IN AN ORGANIZED HEALTH CARE EDUCATION/TRAINING PROGRAM

## 2023-05-30 PROCEDURE — 1159F PR MEDICATION LIST DOCUMENTED IN MEDICAL RECORD: ICD-10-PCS | Mod: CPTII,S$GLB,, | Performed by: STUDENT IN AN ORGANIZED HEALTH CARE EDUCATION/TRAINING PROGRAM

## 2023-05-30 PROCEDURE — 1100F PR PT FALLS ASSESS DOC 2+ FALLS/FALL W/INJURY/YR: ICD-10-PCS | Mod: CPTII,S$GLB,, | Performed by: STUDENT IN AN ORGANIZED HEALTH CARE EDUCATION/TRAINING PROGRAM

## 2023-05-30 PROCEDURE — 3079F PR MOST RECENT DIASTOLIC BLOOD PRESSURE 80-89 MM HG: ICD-10-PCS | Mod: CPTII,S$GLB,, | Performed by: STUDENT IN AN ORGANIZED HEALTH CARE EDUCATION/TRAINING PROGRAM

## 2023-05-30 PROCEDURE — 3074F PR MOST RECENT SYSTOLIC BLOOD PRESSURE < 130 MM HG: ICD-10-PCS | Mod: CPTII,S$GLB,, | Performed by: STUDENT IN AN ORGANIZED HEALTH CARE EDUCATION/TRAINING PROGRAM

## 2023-05-30 PROCEDURE — 3074F SYST BP LT 130 MM HG: CPT | Mod: CPTII,S$GLB,, | Performed by: STUDENT IN AN ORGANIZED HEALTH CARE EDUCATION/TRAINING PROGRAM

## 2023-05-30 RX ORDER — DEXTROMETHORPHAN HYDROBROMIDE, GUAIFENESIN 5; 100 MG/5ML; MG/5ML
650 LIQUID ORAL EVERY 8 HOURS PRN
Qty: 60 TABLET | Refills: 1 | Status: SHIPPED | OUTPATIENT
Start: 2023-05-30

## 2023-05-30 RX ORDER — DICLOFENAC SODIUM 10 MG/G
2 GEL TOPICAL 4 TIMES DAILY
Qty: 200 G | Refills: 2 | Status: SHIPPED | OUTPATIENT
Start: 2023-05-30

## 2023-05-30 RX ORDER — BISMUTH SUBSALICYLATE 525 MG/30ML
15 LIQUID ORAL EVERY 6 HOURS PRN
Qty: 473 ML | Refills: 0 | Status: SHIPPED | OUTPATIENT
Start: 2023-05-30 | End: 2023-06-09

## 2023-05-30 NOTE — PROGRESS NOTES
Progress Report  Nephrology      Consult Requested By: PCP  Reason for Consult: CKD    History of Present Illness:  Patient is a 77 y.o. female presents for a follow up visit for chronic kidney disease. The patient was found to have an elevated serum creatinine during routine laboratory testing, at 1.7 mg/dL.     The patient denies SOB, LE edema, hematuria or foamy urine. She reports that she began taking losartan several weeks ago. Denies any other recent medication change    The patient denies taking NSAIDs or new antibiotics, recreational drugs, recent episode of dehydration, diarrhea, nausea or vomiting, acute illness, hospitalization or exposure to IV radiocontrast.     Past Medical History:   Diagnosis Date    Arthritis     Asthma, chronic     Cataract     Hypertension     Orthostatic hypotension          Current Outpatient Medications:     ADVAIR DISKUS 500-50 mcg/dose DsDv diskus inhaler, Inhale 1 puff into the lungs 2 (two) times daily as needed. , Disp: , Rfl: 3    ALBUTEROL SULFATE (VENTOLIN INHL), Inhale into the lungs every 4 (four) hours as needed., Disp: , Rfl:     amLODIPine (NORVASC) 5 MG tablet, TAKE 1 TABLET(5 MG) BY MOUTH EVERY DAY, Disp: 90 tablet, Rfl: 1    atorvastatin (LIPITOR) 20 MG tablet, TAKE 1 TABLET(20 MG) BY MOUTH EVERY DAY (Patient not taking: Reported on 5/30/2023), Disp: 30 tablet, Rfl: 0    azelastine (ASTELIN) 137 mcg (0.1 %) nasal spray, SPRAY TWICE INTO EACH NOSTRIL ONCE  Q DAY, Disp: , Rfl: 4    calcium-vitamin D 500-125 mg-unit tablet, Take 1 tablet by mouth every other day. , Disp: , Rfl:     carbidopa-levodopa  mg (SINEMET)  mg per tablet, TAKE 1/2 TABLET BY MOUTH THREE TIMES DAILY, Disp: 135 tablet, Rfl: 03    cholecalciferol, vitamin D3, (VITAMIN D3) 25 mcg (1,000 unit) capsule, Take 1,000 Units by mouth., Disp: , Rfl:     diaper,brief,adult,disposable Misc, Use as dictioned (Patient not taking: Reported on 5/30/2023), Disp: 96 each, Rfl: 5    donepeziL  (ARICEPT) 5 MG tablet, TAKE 1 TABLET(5 MG) BY MOUTH EVERY EVENING (Patient not taking: Reported on 2023), Disp: 90 tablet, Rfl: 1    fexofenadine (ALLEGRA) 60 MG tablet, Take 60 mg by mouth once as needed., Disp: , Rfl:     hydroCHLOROthiazide (HYDRODIURIL) 12.5 MG Tab, Take 1 tablet (12.5 mg total) by mouth once daily. (Patient not taking: Reported on 2023), Disp: 90 tablet, Rfl: 3    ipratropium (ATROVENT) 0.06 % nasal spray, As needed, Disp: , Rfl: 4    loperamide (IMODIUM) 2 mg capsule, TK ONE C PO  QID PRN, Disp: , Rfl: 11    losartan (COZAAR) 25 MG tablet, Take 1 tablet (25 mg total) by mouth once daily., Disp: 90 tablet, Rfl: 3    omega-3s-dha-epa-fish oil 200 mg-300 mg- 100 mg-1,000 mg Cap, Take by mouth., Disp: , Rfl:     pneumoc 20-chante conj-dip cr,PF, (PREVNAR 20, PF,) 0.5 mL Syrg injection, Inject into the muscle. (Patient not taking: Reported on 2023), Disp: 0.5 mL, Rfl: 0    QUEtiapine (SEROQUEL) 25 MG Tab, Take 1 tablet (25 mg total) by mouth every evening. Take this medication ~45 minutes prior to going to bed every night (Patient not taking: Reported on 2023), Disp: 90 tablet, Rfl: 1    acetaminophen (TYLENOL) 650 MG TbSR, Take 1 tablet (650 mg total) by mouth every 8 (eight) hours as needed (back pain)., Disp: 60 tablet, Rfl: 1    amLODIPine (NORVASC) 10 MG tablet, Take 10 mg by mouth., Disp: , Rfl:     bismuth subsalicylate (PEPTO BISMOL) 262 mg/15 mL suspension, Take 15 mLs by mouth every 6 (six) hours as needed for Indigestion., Disp: 473 mL, Rfl: 0    cloNIDine (CATAPRES) 0.1 MG tablet, SMARTSI.1 Milligram(s) By Mouth Every 6 Hours PRN, Disp: , Rfl:     diclofenac sodium (VOLTAREN) 1 % Gel, Apply 2 g topically 4 (four) times daily., Disp: 200 g, Rfl: 2    EScitalopram oxalate (LEXAPRO) 10 MG tablet, Take 10 mg by mouth., Disp: , Rfl:     ANURAG-MOX ANTACID-ANTIGAS 200-200-20 mg/5 mL Susp, Take by mouth., Disp: , Rfl:     ibuprofen (ADVIL,MOTRIN) 200 MG tablet, SMARTSI  "Milligram(s) By Mouth Every 6 Hours PRN, Disp: , Rfl:     MILK OF MAGNESIA 400 mg/5 mL suspension, SMARTSI Milligram(s) By Mouth Daily PRN, Disp: , Rfl:     miscellaneous medical supply (C-TUB) Misc, PT evaluation and treatment 5 times a week, Disp: , Rfl:     multivitamin (THERAGRAN) per tablet, Take 1 tablet by mouth., Disp: , Rfl:     nicotine (NICODERM CQ) 14 mg/24 hr, Place 1 patch onto the skin., Disp: , Rfl:     omega-3 acid ethyl esters (LOVAZA) 1 gram capsule, Take 2 capsules by mouth 2 (two) times daily., Disp: , Rfl:     pantoprazole (PROTONIX) 40 MG tablet, Take 40 mg by mouth., Disp: , Rfl:     traZODone (DESYREL) 50 MG tablet, SMARTSI Milligram(s) By Mouth Every Night PRN, Disp: , Rfl:     VISTARIL 50 mg Cap, Take 50 mg by mouth every 6 (six) hours as needed., Disp: , Rfl:   Review of patient's allergies indicates:   Allergen Reactions    Lisinopril Shortness Of Breath, Diarrhea and Nausea Only     All side affects from medication    Aspirin Nausea And Vomiting    Beef containing products     Pork/porcine containing products     Statins-hmg-coa reductase inhibitors Diarrhea and Nausea And Vomiting    Toprol xl [metoprolol succinate] Other (See Comments)     Low blood preasure    Hydralazine-reserpin-hcthiazid Diarrhea        Past Surgical History:   Procedure Laterality Date    CATARACT EXTRACTION      CATARACT EXTRACTION, BILATERAL      PARTIAL HYSTERECTOMY       Family History   Problem Relation Age of Onset    Cancer Maternal Grandmother         "abdomen"    Cancer Maternal Aunt         "abdominal cancer"     Social History     Tobacco Use    Smoking status: Never     Passive exposure: Never    Smokeless tobacco: Never   Substance Use Topics    Alcohol use: No    Drug use: No       Review of Systems   Constitutional:  Positive for weight loss. Negative for chills and fever.   Respiratory: Negative.     Gastrointestinal: Negative.    Genitourinary:  Positive for flank pain. Negative for " dysuria, frequency, hematuria and urgency.   Skin: Negative.    All other systems reviewed and are negative.    Vitals:    04/06/23 1140   BP: (!) 186/96   Pulse: 62       PHYSICAL EXAMINATION:  General: no distress, well nourished  Skin: color, texture, turgor normal. No rash or lesions  HEENT: Eyes: reactive pupils, normal conjunctiva. Oral mucosa moist, no ulcers. Throat: no erythema.  Neck: supple, symmetrical, trachea midline, no JVD, no carotid bruit  Lungs: clear to auscultation bilaterally and normal respiratory effort  Cardiovascular: Heart: regular rate and rhythm, S1, S2 normal, no murmur, rub or gallop. Pulses: 2+ and symmetric.  Abdomen: bowel sounds present, no abdominal bruit, soft, non-tender non-distented; no masses, organomegaly or ascites.   Musculoskeletal: no pitting edema in lower extremities, no clubbing or cyanosis  Lymph Nodes: No cervical or supraclavicular adenopathy  Neurologic: AAOx3, normal strength and tone. No focal deficit. No asterixis.       LABORATORY DATA:  Lab Results   Component Value Date    CREATININE 1.9 (H) 04/03/2023       Prot/Creat Ratio, Urine   Date Value Ref Range Status   04/04/2023 0.15 0.00 - 0.20 Final   09/06/2022 0.10 0.00 - 0.20 Final       Lab Results   Component Value Date     04/03/2023    K 4.4 04/03/2023    CO2 24 04/03/2023       Lab Results   Component Value Date    PTH 89.2 (H) 09/06/2022    CALCIUM 9.8 04/03/2023    PHOS 4.2 09/06/2022       Lab Results   Component Value Date    HGB 11.6 (L) 04/03/2023        Lab Results   Component Value Date    HGBA1C 5.3 04/16/2023       Lab Results   Component Value Date    LDLCALC 151.2 06/22/2022         IMAGING STUDIES  Kidney US: Reviewed  Echocardiogram: Reviewed    IMPRESSION / RECOMMENDATIONS:     1. 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 UA shows hematuria,  leukocyturia again, will repeat urine culture. Mild anemia.       US Renal Artery Stenosis Hyperten (xpd)    US Retroperitoneal Complete, no evidence of renal artery stenosis      2. Primary hypertension  Uncontrolled on amlodipine and losartan. Added chlorthalidone, improved at home now, reports white coat HTN      3. Chronic obstructive pulmonary disease, unspecified COPD type  Managed by PCP            SUMMARY OF PLAN:  Continue losartan, HCTZ  Urine culture  Avoid NSAIDs  4.   RTC in 4 mo

## 2023-05-30 NOTE — PROGRESS NOTES
Primary Care  Return/Acute Office Visit - In Person  5/30/2023  Mwengwe Ndhlovu      [facility] [place]    Patient is a 77 y.o.   Madeline Reilly  has a past medical history of Arthritis, Asthma, chronic, Cataract, Hypertension, and Orthostatic hypotension.    Patient presents with   Chief Complaint   Patient presents with    Low-back Pain     Lower left region     Patient reports chronic history of right sided lower back pain which has worsened. She reports history of falls including one several weeks ago. Symptoms associated with right hip pain. Symptoms can radiate to RLE   She denies any bowel/bladder dysfunction   She denies fever       Social History     Social History Narrative    Retired .     Madeline Reilly family history includes Cancer in her maternal aunt and maternal grandmother.    Active Medications:  Review of patient's allergies indicates:   Allergen Reactions    Lisinopril Shortness Of Breath, Diarrhea and Nausea Only     All side affects from medication    Aspirin Nausea And Vomiting    Beef containing products     Pork/porcine containing products     Statins-hmg-coa reductase inhibitors Diarrhea and Nausea And Vomiting    Toprol xl [metoprolol succinate] Other (See Comments)     Low blood preasure    Hydralazine-reserpin-hcthiazid Diarrhea     Current Outpatient Medications   Medication Instructions    acetaminophen (TYLENOL) 650 mg, Oral, Every 8 hours PRN    ADVAIR DISKUS 500-50 mcg/dose DsDv diskus inhaler 1 puff, Inhalation, 2 times daily PRN    ALBUTEROL SULFATE (VENTOLIN INHL) Inhalation, Every 4 hours PRN    amLODIPine (NORVASC) 5 MG tablet TAKE 1 TABLET(5 MG) BY MOUTH EVERY DAY    amLODIPine (NORVASC) 10 mg, Oral    atorvastatin (LIPITOR) 20 MG tablet TAKE 1 TABLET(20 MG) BY MOUTH EVERY DAY    azelastine (ASTELIN) 137 mcg (0.1 %) nasal spray SPRAY TWICE INTO EACH NOSTRIL ONCE  Q DAY    bismuth subsalicylate (PEPTO BISMOL) 262 mg/15 mL suspension 15 mLs, Oral, Every 6 hours PRN     calcium-vitamin D 500-125 mg-unit tablet 1 tablet, Oral, Every other day    carbidopa-levodopa  mg (SINEMET)  mg per tablet TAKE 1/2 TABLET BY MOUTH THREE TIMES DAILY    cholecalciferol (vitamin D3) (VITAMIN D3) 1,000 Units, Oral    cloNIDine (CATAPRES) 0.1 MG tablet SMARTSI.1 Milligram(s) By Mouth Every 6 Hours PRN    diaper,brief,adult,disposable Misc Use as dictioned    diclofenac sodium (VOLTAREN) 2 g, Topical (Top), 4 times daily    donepeziL (ARICEPT) 5 MG tablet TAKE 1 TABLET(5 MG) BY MOUTH EVERY EVENING    EScitalopram oxalate (LEXAPRO) 10 mg, Oral    fexofenadine (ALLEGRA) 60 mg, Oral, Once as needed,      ANURAG-MOX ANTACID-ANTIGAS 200-200-20 mg/5 mL Susp Oral    hydroCHLOROthiazide (HYDRODIURIL) 12.5 mg, Oral, Daily    ibuprofen (ADVIL,MOTRIN) 200 MG tablet SMARTSI Milligram(s) By Mouth Every 6 Hours PRN    ipratropium (ATROVENT) 0.06 % nasal spray As needed    loperamide (IMODIUM) 2 mg capsule TK ONE C PO  QID PRN    losartan (COZAAR) 25 mg, Oral, Daily    MILK OF MAGNESIA 400 mg/5 mL suspension SMARTSI Milligram(s) By Mouth Daily PRN    miscellaneous medical supply (C-TUB) [facility] [place] and treatment 5 times a week    multivitamin (THERAGRAN) per tablet 1 tablet, Oral    nicotine (NICODERM CQ) 14 mg/24 hr 1 patch, Transdermal    omega-3 acid ethyl esters (LOVAZA) 1 gram capsule 2 capsules, Oral, 2 times daily    omega-3s-dha-epa-fish oil 200 mg-300 mg- 100 mg-1,000 mg Cap Oral    pantoprazole (PROTONIX) 40 mg, Oral    pneumoc 20-chante conj-dip cr,PF, (PREVNAR 20, PF,) 0.5 mL Syrg injection Intramuscular    QUEtiapine (SEROQUEL) 25 mg, Oral, Nightly, Take this medication ~45 minutes prior to going to bed every night    traZODone (DESYREL) 50 MG tablet SMARTSI Milligram(s) By Mouth Every Night PRN    VistariL 50 mg, Oral, Every 6 hours PRN       Review of Systems   All other systems reviewed and are negative.    Vitals:    23 0945   BP: 126/80   BP Location: Right arm  "  Pulse: 65   Resp: 20   Temp: 98 °F (36.7 °C)   SpO2: 99%   Weight: 51.1 kg (112 lb 10.5 oz)   Height: 5' 4" (1.626 m)       Physical Exam  Vitals reviewed.   Musculoskeletal:      Lumbar back: Normal. No bony tenderness. Normal range of motion.        Assessment and Plan     Acute on chronic lumbar back pain   Likely muscular pain. Referral placed to PT   R/o hip fracture and UTI   Tylenol prn         Orders Placed This Visit  Orders Placed This Encounter   Procedures    X-Ray Lumbar Spine AP And Lateral     Standing Status:   Future     Standing Expiration Date:   5/30/2024     Order Specific Question:   May the Radiologist modify the order per protocol to meet the clinical needs of the patient?     Answer:   Yes    X-Ray Hip 2 or 3 views Right (with Pelvis when performed)     Standing Status:   Future     Standing Expiration Date:   5/30/2024     Order Specific Question:   May the Radiologist modify the order per protocol to meet the clinical needs of the patient?     Answer:   Yes     Order Specific Question:   Release to patient     Answer:   Immediate    Urinalysis, Reflex to Urine Culture Urine, Clean Catch     Order Specific Question:   Preferred Collection Type     Answer:   Urine, Clean Catch     Order Specific Question:   Specimen Source     Answer:   Urine    Ambulatory referral/consult to Physical/Occupational Therapy     Standing Status:   Future     Standing Expiration Date:   6/30/2024     Referral Priority:   Routine     Referral Type:   Physical Medicine     Referral Reason:   Specialty Services Required     Requested Specialty:   Physical Therapy     Number of Visits Requested:   1           Upcoming Scheduled Appointments and Follow Up:    Future Appointments   Date Time Provider Department Center   6/6/2023  1:20 PM Adam Javier MD [facility]   6/16/2023  2:15 PM CV [facility] ECHO/EKG [facility]   7/21/2023  2:30 PM Aidee Barbosa MD [facility] "       Follow Up [facility] (with who? when?): No follow-ups on file.      Extended Emergency Contact Information  Primary Emergency Contact: Jenn Vazquez  Address: [address] [place] [address] [facility]  Home Phone: [phone]  Relation: Daughter  Secondary Emergency Contact: Heladio Vazquez  Address: [address]           [place] [address] [place]  Mobile Phone: [phone]  Relation: Son  Preferred language: English   needed? No      Rita Cowan MD   Attending Physician  Primary Care  5/30/2023 - 10:31 AM    I spent a total of 22 minutes on the day of the visit.This includes face to face time and non-face to face time preparing to see the patient (eg, review of tests), obtaining and/or reviewing separately obtained history, documenting clinical information in the electronic or other health record, independently interpreting results and communicating results to the patient/family/caregiver, or care coordinator.

## 2023-05-30 NOTE — TELEPHONE ENCOUNTER
----- Message from Bee Rosas sent at 5/30/2023  2:36 PM CDT -----  Contact: Katharine [phone]  Katharine called and stated that the pt is  allergic to Asprin and she was prescribed diclofenac sodium (VOLTAREN) 1 % Gel. Do you still want the ot to use it.

## 2023-05-30 NOTE — TELEPHONE ENCOUNTER
----- Message from Capri Lord sent at 5/29/2023  4:37 PM CDT -----  Contact: [phone]  Katharine called to advise that the pt's son bought in Tylenol 500 mg gel capsules for the pt. In order for the nurses to be able to administer the medication to the pt (PRN) an order is needed from the PCP.  Fax #[phone]. Please Advise

## 2023-06-01 LAB — BACTERIA UR CULT: ABNORMAL

## 2023-06-01 RX ORDER — CEFPODOXIME PROXETIL 100 MG/1
100 TABLET, FILM COATED ORAL EVERY 12 HOURS
Qty: 10 TABLET | Refills: 0 | Status: SHIPPED | OUTPATIENT
Start: 2023-06-01 | End: 2023-06-06

## 2023-06-05 ENCOUNTER — TELEPHONE (OUTPATIENT)
Dept: PRIMARY CARE CLINIC | Facility: CLINIC | Age: 78
End: 2023-06-05
Payer: MEDICARE

## 2023-06-05 NOTE — TELEPHONE ENCOUNTER
----- Message from Kamini Gloria sent at 6/5/2023 11:58 AM CDT -----  Contact: [phone] with [facility]  3rd call in:    Madison stated  the pt is allergic to Asprin and she was prescribed diclofenac sodium (VOLTAREN) 1 % Gel which contains aspirin. Please call and advise.

## 2023-06-05 NOTE — TELEPHONE ENCOUNTER
Spoke with Ruthy, advised that the call was returned on 05/30/23 (unsure whom I spoke with). Staff was advised on 05/30/23 that the pt's allergy to Aspirin is GI upset & Dr. Cowan does want her to have the topical Diclofenac Gel.     Staff was advised that a note or letter needs to be faxed to [facility] stating that can have the Diclofenac Gel applied topically.     Fax: [phone]  Attn: [facility]

## 2023-06-06 ENCOUNTER — OFFICE VISIT (OUTPATIENT)
Dept: CARDIOLOGY | Facility: CLINIC | Age: 78
End: 2023-06-06
Payer: MEDICARE

## 2023-06-06 VITALS
SYSTOLIC BLOOD PRESSURE: 152 MMHG | OXYGEN SATURATION: 99 % | HEART RATE: 72 BPM | DIASTOLIC BLOOD PRESSURE: 86 MMHG | BODY MASS INDEX: 19.17 KG/M2 | WEIGHT: 111.69 LBS

## 2023-06-06 DIAGNOSIS — I50.32 CHRONIC DIASTOLIC CONGESTIVE HEART FAILURE: ICD-10-CM

## 2023-06-06 DIAGNOSIS — E78.2 MIXED HYPERLIPIDEMIA: Primary | ICD-10-CM

## 2023-06-06 DIAGNOSIS — N18.4 CKD (CHRONIC KIDNEY DISEASE) STAGE 4, GFR 15-29 ML/MIN: ICD-10-CM

## 2023-06-06 PROCEDURE — 1100F PTFALLS ASSESS-DOCD GE2>/YR: CPT | Mod: CPTII,S$GLB,, | Performed by: INTERNAL MEDICINE

## 2023-06-06 PROCEDURE — 3079F DIAST BP 80-89 MM HG: CPT | Mod: CPTII,S$GLB,, | Performed by: INTERNAL MEDICINE

## 2023-06-06 PROCEDURE — 1125F PR PAIN SEVERITY QUANTIFIED, PAIN PRESENT: ICD-10-PCS | Mod: CPTII,S$GLB,, | Performed by: INTERNAL MEDICINE

## 2023-06-06 PROCEDURE — 99999 PR PBB SHADOW E&M-EST. PATIENT-LVL V: CPT | Mod: PBBFAC,,, | Performed by: INTERNAL MEDICINE

## 2023-06-06 PROCEDURE — 3079F PR MOST RECENT DIASTOLIC BLOOD PRESSURE 80-89 MM HG: ICD-10-PCS | Mod: CPTII,S$GLB,, | Performed by: INTERNAL MEDICINE

## 2023-06-06 PROCEDURE — 1159F PR MEDICATION LIST DOCUMENTED IN MEDICAL RECORD: ICD-10-PCS | Mod: CPTII,S$GLB,, | Performed by: INTERNAL MEDICINE

## 2023-06-06 PROCEDURE — 99999 PR PBB SHADOW E&M-EST. PATIENT-LVL V: ICD-10-PCS | Mod: PBBFAC,,, | Performed by: INTERNAL MEDICINE

## 2023-06-06 PROCEDURE — 1100F PR PT FALLS ASSESS DOC 2+ FALLS/FALL W/INJURY/YR: ICD-10-PCS | Mod: CPTII,S$GLB,, | Performed by: INTERNAL MEDICINE

## 2023-06-06 PROCEDURE — 99214 PR OFFICE/OUTPT VISIT, EST, LEVL IV, 30-39 MIN: ICD-10-PCS | Mod: S$GLB,,, | Performed by: INTERNAL MEDICINE

## 2023-06-06 PROCEDURE — 1159F MED LIST DOCD IN RCRD: CPT | Mod: CPTII,S$GLB,, | Performed by: INTERNAL MEDICINE

## 2023-06-06 PROCEDURE — 3077F SYST BP >= 140 MM HG: CPT | Mod: CPTII,S$GLB,, | Performed by: INTERNAL MEDICINE

## 2023-06-06 PROCEDURE — 3288F PR FALLS RISK ASSESSMENT DOCUMENTED: ICD-10-PCS | Mod: CPTII,S$GLB,, | Performed by: INTERNAL MEDICINE

## 2023-06-06 PROCEDURE — 3288F FALL RISK ASSESSMENT DOCD: CPT | Mod: CPTII,S$GLB,, | Performed by: INTERNAL MEDICINE

## 2023-06-06 PROCEDURE — 1125F AMNT PAIN NOTED PAIN PRSNT: CPT | Mod: CPTII,S$GLB,, | Performed by: INTERNAL MEDICINE

## 2023-06-06 PROCEDURE — 3077F PR MOST RECENT SYSTOLIC BLOOD PRESSURE >= 140 MM HG: ICD-10-PCS | Mod: CPTII,S$GLB,, | Performed by: INTERNAL MEDICINE

## 2023-06-06 PROCEDURE — 99214 OFFICE O/P EST MOD 30 MIN: CPT | Mod: S$GLB,,, | Performed by: INTERNAL MEDICINE

## 2023-06-06 NOTE — PROGRESS NOTES
Cardiology    6/6/2023  1:35 PM    Problem list  Patient Active Problem List   Diagnosis    Asthma, chronic    Orthostatic hypotension    White coat syndrome with hypertension    HLD (hyperlipidemia)    Micturition syncope    Vasovagal syncope    Parkinsonism    Memory change    Unintentional weight loss    Spondylosis of lumbar region without myelopathy or radiculopathy    Myofascial pain syndrome    Chronic bilateral low back pain without sciatica    Impaired mobility and activities of daily living    Assistance needed with transportation    Disorder of adrenal gland, unspecified    Chronic diastolic congestive heart failure    Chronic obstructive pulmonary disease, unspecified COPD type    CKD (chronic kidney disease) stage 4, GFR 15-29 ml/min    Pyuria       CC:  F/u    HPI:  She is here for follow-up.  She has no new complaints.  She lives in assisted living center now.  She did not get her echocardiogram done.  Tolerating meds.      Medications  Current Outpatient Medications   Medication Sig Dispense Refill    acetaminophen (TYLENOL) 650 MG TbSR Take 1 tablet (650 mg total) by mouth every 8 (eight) hours as needed (back pain). 60 tablet 1    amLODIPine (NORVASC) 10 MG tablet Take 10 mg by mouth.      bismuth subsalicylate (PEPTO BISMOL) 262 mg/15 mL suspension Take 15 mLs by mouth every 6 (six) hours as needed for Indigestion. 473 mL 0    carbidopa-levodopa  mg (SINEMET)  mg per tablet TAKE 1/2 TABLET BY MOUTH THREE TIMES DAILY 135 tablet 03    cefpodoxime (VANTIN) 100 MG tablet Take 1 tablet (100 mg total) by mouth every 12 (twelve) hours. for 5 days 10 tablet 0    EScitalopram oxalate (LEXAPRO) 10 MG tablet Take 10 mg by mouth.      losartan (COZAAR) 25 MG tablet Take 1 tablet (25 mg total) by mouth once daily. 90 tablet 3    multivitamin (THERAGRAN) per tablet Take 1 tablet by mouth.      pantoprazole (PROTONIX) 40 MG tablet Take 40 mg by mouth.      QUEtiapine (SEROQUEL) 25 MG Tab Take  1 tablet (25 mg total) by mouth every evening. Take this medication ~45 minutes prior to going to bed every night 90 tablet 1    ADVAIR DISKUS 500-50 mcg/dose DsDv diskus inhaler Inhale 1 puff into the lungs 2 (two) times daily as needed.   3    ALBUTEROL SULFATE (VENTOLIN INHL) Inhale into the lungs every 4 (four) hours as needed.      amLODIPine (NORVASC) 5 MG tablet TAKE 1 TABLET(5 MG) BY MOUTH EVERY DAY (Patient not taking: Reported on 2023) 90 tablet 1    atorvastatin (LIPITOR) 20 MG tablet TAKE 1 TABLET(20 MG) BY MOUTH EVERY DAY (Patient not taking: Reported on 2023) 30 tablet 0    azelastine (ASTELIN) 137 mcg (0.1 %) nasal spray SPRAY TWICE INTO EACH NOSTRIL ONCE  Q DAY  4    calcium-vitamin D 500-125 mg-unit tablet Take 1 tablet by mouth every other day.       cholecalciferol, vitamin D3, (VITAMIN D3) 25 mcg (1,000 unit) capsule Take 1,000 Units by mouth.      cloNIDine (CATAPRES) 0.1 MG tablet SMARTSI.1 Milligram(s) By Mouth Every 6 Hours PRN      diaper,brief,adult,disposable Misc Use as dictioned (Patient not taking: Reported on 2023) 96 each 5    diclofenac sodium (VOLTAREN) 1 % Gel Apply 2 g topically 4 (four) times daily. (Patient not taking: Reported on 2023) 200 g 2    donepeziL (ARICEPT) 5 MG tablet TAKE 1 TABLET(5 MG) BY MOUTH EVERY EVENING (Patient not taking: Reported on 2023) 90 tablet 1    fexofenadine (ALLEGRA) 60 MG tablet Take 60 mg by mouth once as needed.      ANURAG-MOX ANTACID-ANTIGAS 200-200-20 mg/5 mL Susp Take by mouth.      ibuprofen (ADVIL,MOTRIN) 200 MG tablet SMARTSI Milligram(s) By Mouth Every 6 Hours PRN      ipratropium (ATROVENT) 0.06 % nasal spray As needed  4    loperamide (IMODIUM) 2 mg capsule TK ONE C PO  QID PRN  11    MILK OF MAGNESIA 400 mg/5 mL suspension SMARTSI Milligram(s) By Mouth Daily PRN      miscellaneous medical supply (C-TUB) Misc PT evaluation and treatment 5 times a week      nicotine (NICODERM CQ) 14 mg/24 hr Place 1  patch onto the skin.      omega-3 acid ethyl esters (LOVAZA) 1 gram capsule Take 2 capsules by mouth 2 (two) times daily.      omega-3s-dha-epa-fish oil 200 mg-300 mg- 100 mg-1,000 mg Cap Take by mouth.      pneumoc 20-chante conj-dip cr,PF, (PREVNAR 20, PF,) 0.5 mL Syrg injection Inject into the muscle. (Patient not taking: Reported on 2023) 0.5 mL 0    traZODone (DESYREL) 50 MG tablet SMARTSI Milligram(s) By Mouth Every Night PRN      VISTARIL 50 mg Cap Take 50 mg by mouth every 6 (six) hours as needed.       No current facility-administered medications for this visit.      Prior to Admission medications    Medication Sig Start Date End Date Taking? Authorizing Provider   acetaminophen (TYLENOL) 650 MG TbSR Take 1 tablet (650 mg total) by mouth every 8 (eight) hours as needed (back pain). 23  Yes Rita Cowan MD   amLODIPine (NORVASC) 10 MG tablet Take 10 mg by mouth. 23  Yes Historical Provider   bismuth subsalicylate (PEPTO BISMOL) 262 mg/15 mL suspension Take 15 mLs by mouth every 6 (six) hours as needed for Indigestion. 23 Yes Rita Cwoan MD   carbidopa-levodopa  mg (SINEMET)  mg per tablet TAKE 1/2 TABLET BY MOUTH THREE TIMES DAILY 10/14/22  Yes Jana Vasquez MD   cefpodoxime (VANTIN) 100 MG tablet Take 1 tablet (100 mg total) by mouth every 12 (twelve) hours. for 5 days 23 Yes Rita Cowan MD   EScitalopram oxalate (LEXAPRO) 10 MG tablet Take 10 mg by mouth. 23  Yes Historical Provider   losartan (COZAAR) 25 MG tablet Take 1 tablet (25 mg total) by mouth once daily. 22 Yes Glenda Alex NP   multivitamin (THERAGRAN) per tablet Take 1 tablet by mouth. 23  Yes Historical Provider   pantoprazole (PROTONIX) 40 MG tablet Take 40 mg by mouth. 23  Yes Historical Provider   QUEtiapine (SEROQUEL) 25 MG Tab Take 1 tablet (25 mg total) by mouth every evening. Take this medication ~45 minutes prior to going to bed every  night 22 Yes Boo Shane MD   ADVAIR DISKUS 500-50 mcg/dose DsDv diskus inhaler Inhale 1 puff into the lungs 2 (two) times daily as needed.  2/10/17   Historical Provider   ALBUTEROL SULFATE (VENTOLIN INHL) Inhale into the lungs every 4 (four) hours as needed.    Historical Provider   amLODIPine (NORVASC) 5 MG tablet TAKE 1 TABLET(5 MG) BY MOUTH EVERY DAY  Patient not taking: Reported on 2023   Aidee Barbosa MD   atorvastatin (LIPITOR) 20 MG tablet TAKE 1 TABLET(20 MG) BY MOUTH EVERY DAY  Patient not taking: Reported on 2023   Karen Espinosa MD   azelastine (ASTELIN) 137 mcg (0.1 %) nasal spray SPRAY TWICE INTO EACH NOSTRIL ONCE  Q DAY 17   Historical Provider   calcium-vitamin D 500-125 mg-unit tablet Take 1 tablet by mouth every other day.     Historical Provider   cholecalciferol, vitamin D3, (VITAMIN D3) 25 mcg (1,000 unit) capsule Take 1,000 Units by mouth.    Historical Provider   cloNIDine (CATAPRES) 0.1 MG tablet SMARTSI.1 Milligram(s) By Mouth Every 6 Hours PRN 23   Historical Provider   diaper,brief,adult,disposable Misc Use as dictioned  Patient not taking: Reported on 2023   Aidee Barbosa MD   diclofenac sodium (VOLTAREN) 1 % Gel Apply 2 g topically 4 (four) times daily.  Patient not taking: Reported on 2023   Rita Cowan MD   donepeziL (ARICEPT) 5 MG tablet TAKE 1 TABLET(5 MG) BY MOUTH EVERY EVENING  Patient not taking: Reported on 2023 3/14/23   Aidee Barbosa MD   fexofenadine (ALLEGRA) 60 MG tablet Take 60 mg by mouth once as needed.    Historical Provider   ANURAG-MOX ANTACID-ANTIGAS 200-200-20 mg/5 mL Susp Take by mouth. 23   Historical Provider   ibuprofen (ADVIL,MOTRIN) 200 MG tablet SMARTSI Milligram(s) By Mouth Every 6 Hours PRN 23   Historical Provider   ipratropium (ATROVENT) 0.06 % nasal spray As needed 17   Historical Provider   loperamide  (IMODIUM) 2 mg capsule TK ONE C PO  QID PRN 19   Historical Provider   MILK OF MAGNESIA 400 mg/5 mL suspension SMARTSI Milligram(s) By Mouth Daily PRN 23   Historical Provider   miscellaneous medical supply (C-TUB) [facility] PT evaluation and treatment 5 times a week 23   Historical Provider   nicotine (NICODERM CQ) 14 mg/24 hr Place 1 patch onto the skin. 23   Historical Provider   omega-3 acid ethyl esters (LOVAZA) 1 gram capsule Take 2 capsules by mouth 2 (two) times daily. 23   Historical Provider   omega-3s-dha-epa-fish oil 200 mg-300 mg- 100 mg-1,000 mg Cap Take by mouth.    Historical Provider   pneumoc 20-chante conj-dip cr,PF, (PREVNAR 20, PF,) 0.5 mL Syrg injection Inject into the muscle.  Patient not taking: Reported on 2023   Jaida Navarrete, PharmD   traZODone (DESYREL) 50 MG tablet SMARTSI Milligram(s) By Mouth Every Night PRN 23   Historical Provider   VISTARIL 50 mg Cap Take 50 mg by mouth every 6 (six) hours as needed. 23   Historical Provider   hydroCHLOROthiazide (HYDRODIURIL) 12.5 MG Tab Take 1 tablet (12.5 mg total) by mouth once daily.  Patient not taking: Reported on 2023  Fabien Lora MD         History  Past Medical History:   Diagnosis Date    Arthritis     Asthma, chronic     Cataract     Hypertension     Orthostatic hypotension      Past Surgical History:   Procedure Laterality Date    CATARACT EXTRACTION      CATARACT EXTRACTION, BILATERAL      PARTIAL HYSTERECTOMY       Social History     Socioeconomic History    Marital status:    Tobacco Use    Smoking status: Never     Passive exposure: Never    Smokeless tobacco: Never   Substance and Sexual Activity    Alcohol use: No    Drug use: No   Social History Narrative    Retired .         Allergies  Review of patient's allergies indicates:   Allergen Reactions    Lisinopril Shortness Of Breath, Diarrhea and Nausea Only     All side affects from  medication    Beef containing products     Hydrochlorothiazide     Pork/porcine containing products     Reserpine     Statins-hmg-coa reductase inhibitors Diarrhea and Nausea And Vomiting    Toprol xl [metoprolol succinate] Other (See Comments)     Low blood preasure    Aspirin Nausea And Vomiting    Hydralazine-reserpin-hcthiazid Diarrhea         Review of Systems   Review of Systems   Constitutional: Positive for decreased appetite, malaise/fatigue and weight loss.   Cardiovascular: Negative.    Respiratory:  Negative for cough, hemoptysis, shortness of breath, sleep disturbances due to breathing, snoring, sputum production and wheezing.    Gastrointestinal:  Negative for hematemesis, hematochezia and melena.   All other systems reviewed and are negative.      Physical Exam  Wt Readings from Last 1 Encounters:   06/06/23 50.7 kg (111 lb 10.6 oz)     BP Readings from Last 3 Encounters:   06/06/23 (!) 152/86   05/30/23 126/80   04/06/23 (!) 186/96     Pulse Readings from Last 1 Encounters:   06/06/23 72     Body mass index is 19.17 kg/m².    Physical Exam  Vitals reviewed.   Constitutional:       General: She is not in acute distress.     Appearance: She is underweight.   Neck:      Vascular: No JVD.   Cardiovascular:      Rate and Rhythm: Normal rate and regular rhythm.      Heart sounds: S1 normal and S2 normal.   Pulmonary:      Breath sounds: Normal breath sounds and air entry.           Assessment  1. Chronic diastolic congestive heart failure  stable    2. Mixed hyperlipidemia  On statin     3. CKD (chronic kidney disease) stage 4, GFR 15-29 ml/min  Unchanged    4.  HTN  Allow for supine HTN in order to not risk syncope from low BP        Plan and Discussion  Continue current medication.  Explained that we would allow for supine hypertension ordered to not drop her blood pressure and cause syncope.  She understands and is in agreement with this plan.    Follow Up  6 months      Adam Javier MD, F.A.C.C,  F.S.C.A.I.        30 minutes were spent in chart review, documentation and review of results, and evaluation, treatment, and counseling of patient on the same day of service.    Disclaimer: This document was created using voice recognition software ([employer] Although it may be edited, this document may contain errors related to incorrect recognition of the spoken word. Please call the physician if clarification is needed.

## 2023-06-08 ENCOUNTER — TELEPHONE (OUTPATIENT)
Dept: PRIMARY CARE CLINIC | Facility: CLINIC | Age: 78
End: 2023-06-08
Payer: MEDICARE

## 2023-06-08 NOTE — TELEPHONE ENCOUNTER
----- Message from Domonique Garcia sent at 6/8/2023 11:15 AM CDT -----  Contact: [phone] Ruthy Bliss LPN at [facility] stated that the pt had a fall after dinner last night. Pt did not report it until today. Pt does have a knot on the side of her forehead. Pt did not complain about movement. Pt son denied to take pt to the ER.

## 2023-06-10 ENCOUNTER — PATIENT MESSAGE (OUTPATIENT)
Dept: NEPHROLOGY | Facility: CLINIC | Age: 78
End: 2023-06-10
Payer: MEDICARE

## 2023-06-13 ENCOUNTER — HOSPITAL ENCOUNTER (OUTPATIENT)
Dept: RADIOLOGY | Facility: HOSPITAL | Age: 78
Discharge: HOME | End: 2023-06-13
Attending: STUDENT IN AN ORGANIZED HEALTH CARE EDUCATION/TRAINING PROGRAM
Payer: MEDICARE

## 2023-06-13 DIAGNOSIS — M25.551 RIGHT HIP PAIN: ICD-10-CM

## 2023-06-13 DIAGNOSIS — G89.29 CHRONIC RIGHT-SIDED LOW BACK PAIN WITH RIGHT-SIDED SCIATICA: ICD-10-CM

## 2023-06-13 DIAGNOSIS — M54.41 CHRONIC RIGHT-SIDED LOW BACK PAIN WITH RIGHT-SIDED SCIATICA: ICD-10-CM

## 2023-06-13 PROCEDURE — 72100 X-RAY EXAM L-S SPINE 2/3 VWS: CPT | Mod: 26,,, | Performed by: RADIOLOGY

## 2023-06-13 PROCEDURE — 72100 X-RAY EXAM L-S SPINE 2/3 VWS: CPT | Mod: TC,PO

## 2023-06-13 PROCEDURE — 72100 XR LUMBAR SPINE AP AND LATERAL: ICD-10-PCS | Mod: 26,,, | Performed by: RADIOLOGY

## 2023-06-19 ENCOUNTER — TELEPHONE (OUTPATIENT)
Dept: PRIMARY CARE CLINIC | Facility: CLINIC | Age: 78
End: 2023-06-19

## 2023-06-19 NOTE — TELEPHONE ENCOUNTER
----- Message from Avril Saucedo sent at 6/19/2023 11:17 AM CDT -----  Contact: [phone] [place] (NURSE AT THE TIME)  Good Morning,  Patient has been having diarrhea and low BP . Patient has been dizzy since 06/13/2023    Nurse is calling from [facility] [employer]    Please call and advise

## 2023-06-19 NOTE — TELEPHONE ENCOUNTER
The care giver at [facility] called to report pt has been suffering with Diarrhea since Friday can she get a order to give Imodium PRN just to have on her chart and a bp  parameters before administering the imodium. Pt's last bp reading was low is caused her to be little dizzy but the pt is fine now. The caregiver did not have any vitals to report

## 2023-06-19 NOTE — TELEPHONE ENCOUNTER
----- Message from Landy Peck sent at 6/17/2023  9:38 AM CDT -----  Contact: Katharine/Brynn [phone]  [facility] calling to advise you that pt having diarrhea starting yesterday extending into today. They are requesting something be called in. I did advise Brynn that the provider will be back in Monday and she states that is fine.       [facility] [address]  [address]  [address]  [place] [address]  Phone: [phone] Fax: [phone]

## 2023-06-21 NOTE — TELEPHONE ENCOUNTER
Is this a nursing home, do they have a doctor on site???  I have not seen her since July 2022, and the providers who have are not reconciling the med card. I need a CURRENT list of meds to address low blood pressure.   Yes, she may have Imodium.

## 2023-06-22 ENCOUNTER — TELEPHONE (OUTPATIENT)
Dept: PRIMARY CARE CLINIC | Facility: CLINIC | Age: 78
End: 2023-06-22
Payer: MEDICARE

## 2023-06-22 ENCOUNTER — PATIENT MESSAGE (OUTPATIENT)
Dept: PRIMARY CARE CLINIC | Facility: CLINIC | Age: 78
End: 2023-06-22
Payer: MEDICARE

## 2023-06-22 NOTE — TELEPHONE ENCOUNTER
----- Message from Chandler Martin sent at 6/22/2023 12:22 PM CDT -----  Contact: [phone] @ [place] [facility] [place]  [facility] afternoon [place] would like a call back to see if the doc could call something in for diarrhea for patient . Please give them a call back [phone]

## 2023-06-23 ENCOUNTER — TELEPHONE (OUTPATIENT)
Dept: PRIMARY CARE CLINIC | Facility: CLINIC | Age: 78
End: 2023-06-23

## 2023-06-23 RX ORDER — CEFPODOXIME PROXETIL 100 MG/1
100 TABLET, FILM COATED ORAL EVERY 12 HOURS
COMMUNITY

## 2023-06-23 RX ORDER — LOPERAMIDE HYDROCHLORIDE 2 MG/1
CAPSULE ORAL
Refills: 11 | Status: CANCELLED | OUTPATIENT
Start: 2023-06-23

## 2023-06-23 NOTE — TELEPHONE ENCOUNTER
----- Message from Glenda Madrid sent at 6/23/2023  9:59 AM CDT -----  Contact: Ruthy/Katlyn/[phone]  Ruthy with [facility] would like a call back to see if the doc could call something in for diarrhea for patient . Ruthy stated that patient has been fighting with issue for the whole week and since pcp is leaving at lunch would like this to be done today, because weekend is almost coming.  Please give them a call back [phone].

## 2023-06-27 DIAGNOSIS — I10 PRIMARY HYPERTENSION: ICD-10-CM

## 2023-06-27 DIAGNOSIS — G20.A1 PARKINSON'S DISEASE: ICD-10-CM

## 2023-06-27 NOTE — TELEPHONE ENCOUNTER
No care due was identified.  [facility] Embedded Care Due Messages. Reference number: [id number].   6/27/2023 10:29:26 AM CDT

## 2023-06-27 NOTE — TELEPHONE ENCOUNTER
----- Message from Malissa Moreno sent at 6/27/2023  3:01 PM CDT -----  Contact: Sylvia vines/ [facility]   Sylvia vines/ [facility] is calling in regards to the previous messages. Sylvia is asking for something for the diarrhea for this patient like maybe some imodium. The pt does not like the pepto. Sylvia is also asking for parameters for the pts BP when to hold the meds? Please call and advise. Thank you.

## 2023-06-27 NOTE — TELEPHONE ENCOUNTER
----- Message from Chandler Sahaen sent at 6/27/2023  9:54 AM CDT -----  Contact: [phone] @ patient  Requesting an RX refill or new RX.carbidopa-levodopa  mg (SINEMET)  mg per tablet  Is this a refill or new RX: refill  RX name and strength (copy/paste from chart):    Is this a 30 day or 90 day RX:   Pharmacy name and phone #  [facility] - [place], [place] - [address]  The doctors have asked that we provide their patients with the following 2 reminders -- prescription refills can take up to 72 hours, and a friendly reminder that in the future you can use your [employer] [employer] account to request refills:     Requesting an RX refill or new RX.amLODIPine (NORVASC) 10 MG tablet  Is this a refill or new RX: refill  RX name and strength (copy/paste from chart):    Is this a 30 day or 90 day RX:   Pharmacy name and phone #   [facility] - [place], [place] - [address]  The doctors have asked that we provide their patients with the following 2 reminders -- prescription refills can take up to 72 hours, and a friendly reminder that in the future you can use your [employer] account to request refills:     Requesting an RX refill or new RX.losartan (COZAAR) 25 MG tablet  Is this a refill or new RX: refill  RX name and strength (copy/paste from chart):    Is this a 30 day or 90 day RX:   Pharmacy name and phone #  [facility] - [place], [place] - [address]  The doctors have asked that we provide their patients with the following 2 reminders -- prescription refills can take up to 72 hours, and a friendly reminder that in the future you can use your [facility] account to request refills:     Requesting an RX refill or new RX.EScitalopram oxalate (LEXAPRO) 10 MG tablet  Is this a refill or new RX: refill  RX name and strength (copy/paste from chart):    Is this a 30 day or 90 day RX:   Pharmacy name and phone # [facility]  [address]  The doctors have asked that we provide their patients with the following 2 reminders -- prescription refills can take up to 72 hours, and a friendly reminder that in the future you can use your [facility] account to request refills:     Requesting an RX refill or new RX.QUEtiapine (SEROQUEL) 25 MG Tab  Is this a refill or new RX: refill  RX name and strength (copy/paste from chart):    Is this a 30 day or 90 day RX:   Pharmacy name and phone #   [facility] [address]  The doctors have asked that we provide their patients with the following 2 reminders -- prescription refills can take up to 72 hours, and a friendly reminder that in the future you can use your [facility] account to request refills:

## 2023-06-27 NOTE — TELEPHONE ENCOUNTER
Requesting medication for diarrhea since 06/16/23. Advised that it appears in a message on 06/19/23 it appears Dr. Barbosa noted the patient can take Imodium. Nurse advised that if pt is to take imodium an order for the medication is needed to [facility], Rx pended. Pt has been taking Pepto without relief.     Nursing staff is requesting BP parameters for patient, they advised an order or provider note is needed with parameters. Fax to [phone], wilfredo Ward.

## 2023-06-27 NOTE — TELEPHONE ENCOUNTER
LOV 07/21/22 - Dr. Barbosa  Last Acute visit 05/30/23  Appt scheduled for 07/21/23 - Dr. Barbosa    Seroquel written by psychiatry.   Sinemet written by neurology.

## 2023-06-28 RX ORDER — LOSARTAN POTASSIUM 25 MG/1
25 TABLET ORAL DAILY
Qty: 30 TABLET | Refills: 0 | Status: SHIPPED | OUTPATIENT
Start: 2023-06-28

## 2023-06-28 RX ORDER — AMLODIPINE BESYLATE 10 MG/1
10 TABLET ORAL DAILY
Qty: 30 TABLET | Refills: 0 | Status: SHIPPED | OUTPATIENT
Start: 2023-06-28

## 2023-06-28 RX ORDER — ESCITALOPRAM OXALATE 10 MG/1
10 TABLET ORAL DAILY
Qty: 30 TABLET | Refills: 0 | Status: SHIPPED | OUTPATIENT
Start: 2023-06-28

## 2023-06-28 RX ORDER — LOPERAMIDE HYDROCHLORIDE 2 MG/1
2 CAPSULE ORAL 2 TIMES DAILY PRN
Qty: 30 CAPSULE | Refills: 0 | Status: SHIPPED | OUTPATIENT
Start: 2023-06-28

## 2023-06-30 ENCOUNTER — PES CALL (OUTPATIENT)
Dept: ADMINISTRATIVE | Facility: CLINIC | Age: 78
End: 2023-06-30
Payer: MEDICARE

## 2023-07-02 DIAGNOSIS — G20.A1 PARKINSON'S DISEASE: ICD-10-CM

## 2023-07-02 DIAGNOSIS — F05 SUNDOWNING: ICD-10-CM

## 2023-07-02 DIAGNOSIS — I10 PRIMARY HYPERTENSION: ICD-10-CM

## 2023-07-03 RX ORDER — LOSARTAN POTASSIUM 25 MG/1
25 TABLET ORAL DAILY
Qty: 30 TABLET | Refills: 0 | Status: CANCELLED | OUTPATIENT
Start: 2023-07-03

## 2023-07-03 RX ORDER — PANTOPRAZOLE SODIUM 40 MG/1
40 TABLET, DELAYED RELEASE ORAL DAILY
Qty: 90 TABLET | Refills: 1 | Status: CANCELLED | OUTPATIENT
Start: 2023-07-03

## 2023-07-03 RX ORDER — AMLODIPINE BESYLATE 10 MG/1
10 TABLET ORAL DAILY
Qty: 30 TABLET | Refills: 0 | Status: CANCELLED | OUTPATIENT
Start: 2023-07-03

## 2023-07-03 NOTE — TELEPHONE ENCOUNTER
----- Message from Cely S Washington sent at 7/3/2023 10:55 AM CDT -----  Contact: Ms. Bliss @ [facility] Phone# [phone]  Requesting an RX refill or new RX.  Is this a refill or new RX: Refill  RX name and strength  carbidopa-levodopa  mg (SINEMET)  mg per tablet  Is this a 30 day or 90 day RX: 30  Pharmacy name and phone #   [facility] [place] - [address]  [address]  [address]  [facility] [address]  Phone: [phone] Fax: [phone]  The doctors have asked that we provide their patients with the following 2 reminders -- prescription refills can take up to 72 hours, and a friendly reminder that in the future you can use your [facility] account to request refills:     Requesting an RX refill or new RX.  Is this a refill or new RX: Refill  RX name and strength amLODIPine (NORVASC) 10 MG tablet  Is this a 30 day or 90 day RX: 30  Pharmacy name and phone #   [facility] - [address]  [address]  [address]  [facility] [address]  Phone: [phone] Fax: [phone]  The doctors have asked that we provide their patients with the following 2 reminders -- prescription refills can take up to 72 hours, and a friendly reminder that in the future you can use your [facility] account to request refills:     Requesting an RX refill or new RX.  Is this a refill or new RX: Refill  RX name and strength losartan (COZAAR) 25 MG tablet  Is this a 30 day or 90 day RX: 30  Pharmacy name and phone #   [facility] - [address]  [address]  [address]  [place] [address]  Phone: [phone] Fax: [phone]  The doctors have asked that we provide their patients with the following 2 reminders -- prescription refills can take up to 72 hours, and a friendly reminder that in the future you can use your [facility] account to request refills:

## 2023-07-03 NOTE — TELEPHONE ENCOUNTER
No care due was identified.  [facility] Embedded Care Due Messages. Reference number: [id number].   7/03/2023 11:12:52 AM CDT

## 2023-07-03 NOTE — TELEPHONE ENCOUNTER
----- Message from Cely S Washington sent at 7/3/2023 10:59 AM CDT -----  Contact: Ms. Bliss @ [facility] Phone# [phone]  Requesting an RX refill or new RX.  Is this a refill or new RX: Refill  RX name and strength pantoprazole (PROTONIX) 40 MG tablet  Is this a 30 day or 90 day RX: 30  Pharmacy name and phone #   [facility] [facility] [place] [address]  [address]  [address]  [facility] [address]  Phone: [phone] Fax: [phone]  The doctors have asked that we provide their patients with the following 2 reminders -- prescription refills can take up to 72 hours, and a friendly reminder that in the future you can use your [facility] account to request refills:     Requesting an RX refill or new RX.  Is this a refill or new RX: Refill  RX name and strength EScitalopram oxalate (LEXAPRO) 10 MG tablet  Is this a 30 day or 90 day RX: 30  Pharmacy name and phone #   [facility] [place] - [address]  [address]  [address]  [facility] [address]  Phone: [phone] Fax: [phone]  The doctors have asked that we provide their patients with the following 2 reminders -- prescription refills can take up to 72 hours, and a friendly reminder that in the future you can use your [facility] account to request refills:     Requesting an RX refill or new RX.  Is this a refill or new RX: Refill  RX name and strength QUEtiapine (SEROQUEL) 25 MG Tab (  Is this a 30 day or 90 day RX: 30  Pharmacy name and phone #   [facility] [place] - [address]  [address]  [address]  [facility] [address]  Phone: [phone] Fax: [phone]  The doctors have asked that we provide their patients with the following 2 reminders -- prescription refills can take up to 72 hours, and a friendly reminder that in the future you can use your [facility] account to request refills:

## 2023-07-05 ENCOUNTER — PATIENT MESSAGE (OUTPATIENT)
Dept: PRIMARY CARE CLINIC | Facility: CLINIC | Age: 78
End: 2023-07-05
Payer: MEDICARE

## 2023-07-05 RX ORDER — CARBIDOPA AND LEVODOPA 25; 100 MG/1; MG/1
TABLET ORAL
Qty: 15 TABLET | Refills: 0 | Status: SHIPPED | OUTPATIENT
Start: 2023-07-05 | End: 2023-07-19 | Stop reason: SDUPTHER

## 2023-07-05 RX ORDER — QUETIAPINE FUMARATE 25 MG/1
25 TABLET, FILM COATED ORAL NIGHTLY
Qty: 90 TABLET | Refills: 0 | Status: SHIPPED | OUTPATIENT
Start: 2023-07-05

## 2023-07-10 ENCOUNTER — PATIENT MESSAGE (OUTPATIENT)
Dept: PRIMARY CARE CLINIC | Facility: CLINIC | Age: 78
End: 2023-07-10
Payer: MEDICARE

## 2023-07-10 ENCOUNTER — TELEPHONE (OUTPATIENT)
Dept: PRIMARY CARE CLINIC | Facility: CLINIC | Age: 78
End: 2023-07-10
Payer: MEDICARE

## 2023-07-10 NOTE — TELEPHONE ENCOUNTER
----- Message from Kamini Faizan sent at 7/10/2023 12:20 PM CDT -----  Contact: [phone] Mariely with [facility]  Requesting an RX refill or new RX.  Is this a refill or new RX: new  RX name and strength (copy/paste from chart):  amLODIPine (NORVASC) 10 MG tablet  Is this a 30 day or 90 day RX:   Pharmacy name and phone # (copy/paste from chart):    [facility] - [place], [place] - [address]  [address]  [place] [address]  Phone: [phone] Fax: [phone]       Requesting an RX refill or new RX.  Is this a refill or new RX: new  RX name and strength (copy/paste from chart):  losartan (COZAAR) 25 MG tablet  Is this a 30 day or 90 day RX:   Pharmacy name and phone # (copy/paste from chart):    [facility] - [place], [place] - [address]  [address]  [place] [address]  Phone: [phone] Fax: [phone]     Requesting an RX refill or new RX.  Is this a refill or new RX: new  RX name and strength (copy/paste from chart):  carbidopa-levodopa  mg (SINEMET)  mg per tablet  Is this a 30 day or 90 day RX:   Pharmacy name and phone # (copy/paste from chart):  [facility] - [place], [place] - [address]  [address]  [place] [address]  Phone: [phone] Fax: [phone]

## 2023-07-10 NOTE — TELEPHONE ENCOUNTER
I spoke to Sapna with [facility] pharmacy all requested meds will be filled today I called to inform Shruthi she is done for the day. I spoke to Lucretia gave her the report that all medications is filled

## 2023-07-14 ENCOUNTER — TELEPHONE (OUTPATIENT)
Dept: PRIMARY CARE CLINIC | Facility: CLINIC | Age: 78
End: 2023-07-14
Payer: MEDICARE

## 2023-07-14 DIAGNOSIS — G20.A1 PARKINSON'S DISEASE: ICD-10-CM

## 2023-07-14 NOTE — TELEPHONE ENCOUNTER
----- Message from Bennie Vazquez sent at 7/14/2023  9:21 AM CDT -----  Contact: [phone]  [facility] is calling to state that prescription for carbidopa-levodopa  mg (SINEMET)  mg per tablet was denied when they called to get a refill. Wants to know if the medication is discontinued they will need an order stating that. If not new prescription can be sent to pharmacy below.      [facility] [address]  [address]  [address]  [place] [address]  Phone: [phone] Fax: [phone]

## 2023-07-19 RX ORDER — CARBIDOPA AND LEVODOPA 25; 100 MG/1; MG/1
1 TABLET ORAL 3 TIMES DAILY
Qty: 90 TABLET | Refills: 0 | Status: SHIPPED | OUTPATIENT
Start: 2023-07-19

## 2024-01-10 ENCOUNTER — PATIENT MESSAGE (OUTPATIENT)
Dept: RESEARCH | Facility: CLINIC | Age: 79
End: 2024-01-10
Payer: MEDICARE

## 2024-05-22 ENCOUNTER — PATIENT MESSAGE (OUTPATIENT)
Dept: NEUROLOGY | Facility: CLINIC | Age: 79
End: 2024-05-22
Payer: MEDICARE

## 2024-08-30 ENCOUNTER — PATIENT MESSAGE (OUTPATIENT)
Dept: PRIMARY CARE CLINIC | Facility: CLINIC | Age: 79
End: 2024-08-30
Payer: MEDICARE

## 2024-08-30 NOTE — TELEPHONE ENCOUNTER
Spoke with son about re scheduling appt. Apt has been re scheduled due to Dr. Julian's start date is on 09/09.

## 2024-09-05 ENCOUNTER — TELEPHONE (OUTPATIENT)
Dept: PRIMARY CARE CLINIC | Facility: CLINIC | Age: 79
End: 2024-09-05
Payer: MEDICARE

## 2024-09-05 NOTE — TELEPHONE ENCOUNTER
----- Message from Ritika Cantor sent at 9/4/2024  4:01 PM CDT -----  Contact: [phone]  Patient is returning a phone call.    Who left a message for the patient: Kristel    Does patient know what this is regarding:  appt    Would you like a call back, or a response through your [facility] portal?:   call    Comments:

## 2024-09-16 ENCOUNTER — OFFICE VISIT (OUTPATIENT)
Dept: PRIMARY CARE CLINIC | Facility: CLINIC | Age: 79
End: 2024-09-16
Payer: MEDICARE

## 2024-09-16 VITALS — DIASTOLIC BLOOD PRESSURE: 101 MMHG | SYSTOLIC BLOOD PRESSURE: 149 MMHG | OXYGEN SATURATION: 97 % | HEART RATE: 63 BPM

## 2024-09-16 DIAGNOSIS — G20.A1 PARKINSON'S DISEASE, UNSPECIFIED WHETHER DYSKINESIA PRESENT, UNSPECIFIED WHETHER MANIFESTATIONS FLUCTUATE: ICD-10-CM

## 2024-09-16 DIAGNOSIS — N18.4 CKD (CHRONIC KIDNEY DISEASE) STAGE 4, GFR 15-29 ML/MIN: ICD-10-CM

## 2024-09-16 DIAGNOSIS — R09.89 LABILE BLOOD PRESSURE: Primary | ICD-10-CM

## 2024-09-16 DIAGNOSIS — R79.9 ABNORMAL FINDING OF BLOOD CHEMISTRY, UNSPECIFIED: ICD-10-CM

## 2024-09-16 DIAGNOSIS — Z74.09 IMPAIRED MOBILITY AND ACTIVITIES OF DAILY LIVING: ICD-10-CM

## 2024-09-16 DIAGNOSIS — Z78.9 IMPAIRED MOBILITY AND ACTIVITIES OF DAILY LIVING: ICD-10-CM

## 2024-09-16 DIAGNOSIS — M47.816 SPONDYLOSIS OF LUMBAR REGION WITHOUT MYELOPATHY OR RADICULOPATHY: ICD-10-CM

## 2024-09-16 DIAGNOSIS — I10 WHITE COAT SYNDROME WITH HYPERTENSION: ICD-10-CM

## 2024-09-16 DIAGNOSIS — J44.9 CHRONIC OBSTRUCTIVE PULMONARY DISEASE, UNSPECIFIED COPD TYPE: ICD-10-CM

## 2024-09-16 PROCEDURE — 99215 OFFICE O/P EST HI 40 MIN: CPT | Mod: S$GLB,,, | Performed by: INTERNAL MEDICINE

## 2024-09-16 PROCEDURE — 99999 PR PBB SHADOW E&M-EST. PATIENT-LVL V: CPT | Mod: PBBFAC,,, | Performed by: INTERNAL MEDICINE

## 2024-09-16 PROCEDURE — 3080F DIAST BP >= 90 MM HG: CPT | Mod: CPTII,S$GLB,, | Performed by: INTERNAL MEDICINE

## 2024-09-16 PROCEDURE — 1159F MED LIST DOCD IN RCRD: CPT | Mod: CPTII,S$GLB,, | Performed by: INTERNAL MEDICINE

## 2024-09-16 PROCEDURE — 3077F SYST BP >= 140 MM HG: CPT | Mod: CPTII,S$GLB,, | Performed by: INTERNAL MEDICINE

## 2024-09-16 PROCEDURE — 1158F ADVNC CARE PLAN TLK DOCD: CPT | Mod: CPTII,S$GLB,, | Performed by: INTERNAL MEDICINE

## 2024-09-16 PROCEDURE — 1160F RVW MEDS BY RX/DR IN RCRD: CPT | Mod: CPTII,S$GLB,, | Performed by: INTERNAL MEDICINE

## 2024-09-16 PROCEDURE — 1126F AMNT PAIN NOTED NONE PRSNT: CPT | Mod: CPTII,S$GLB,, | Performed by: INTERNAL MEDICINE

## 2024-09-16 RX ORDER — DIPHENHYDRAMINE HCL 25 MG
25 CAPSULE ORAL EVERY 6 HOURS PRN
COMMUNITY

## 2024-09-16 RX ORDER — HYDRALAZINE HYDROCHLORIDE 10 MG/1
10 TABLET, FILM COATED ORAL 3 TIMES DAILY
COMMUNITY

## 2024-09-16 RX ORDER — FLUDROCORTISONE ACETATE 0.1 MG/1
100 TABLET ORAL DAILY
COMMUNITY
End: 2024-09-19 | Stop reason: SDUPTHER

## 2024-09-16 RX ORDER — RIVASTIGMINE 4.6 MG/24H
1 PATCH, EXTENDED RELEASE TRANSDERMAL DAILY
COMMUNITY

## 2024-09-16 NOTE — PROGRESS NOTES
Primary Care Provider Appointment   [facility]        Subjective:       Patient ID:  Ms. Correa is a 78 y.o. female being seen for Establish Care      Chief Complaint: Establish Care    This is a 79 y/o female with dementia, HTN, HFrEF, hyperlipidemia,CKD, Parkinson's disease, GERD is here to establish care   She was at [facility] for the past 1 year and was following up with nurse practitioner on site.  Her son and her daughter-in-law are with her at the appointment today assisting with the history.  She is currently living with them.  She was taken out of the [facility] starting 1st of September.    She presented to [facility] ER on 8/16/24 after visiting a primary care office and her blood pressure being over 200.  Pts son reports that pts BP is labile. In ER note, pt c./o chest pain. Her HS troponin level are 37,54,49. . Pts 12 lead EKG NSR with no ischemic changes. TTE was declined by the son.      Her current blood pressure is above goal.  She is not taking any blood pressure medication as of now.  She is on fludrocortisone for low blood pressure, unsure how long she has been on that.  The son is giving her blood pressure medicine only if her systolic blood pressure shoots above 200.  He mentioned that her pressures have been very labile and sometimes it drops to systolic of 80s if she takes blood pressure medication on a daily basis which is why they do not want to take any antihypertensive and just be on p.r.n. medication.  They were following Cardiology in the past but has not seen in couple of years.  They follow Neurology for her Parkinson's and Psychiatry for her depression.    They did not need any medication refills as of today   They have upcoming appointments with Neurology and Psychiatry   She has finished surveillance for breast cancer and colon cancer   She does not have any complaints today.  They would like to get home health for PT/OT.   Patient uses a cane most of the time and sometimes walker.  She is mostly watching TV during the day and takes frequent breaks and walks around the house.  Her daughter-in-law is her full-time caretaker now.        Past Medical History:   Diagnosis Date    Arthritis     Asthma, chronic     Cataract     Hypertension     Orthostatic hypotension        Review of Systems   HENT:  Negative for hearing loss.    Eyes:  Negative for discharge.   Respiratory:  Negative for wheezing.    Cardiovascular:  Negative for chest pain and palpitations.   Gastrointestinal:  Negative for blood in stool, constipation, diarrhea and vomiting.   Genitourinary:  Negative for dysuria and hematuria.   Musculoskeletal:  Negative for falls and neck pain.   Neurological:  Positive for weakness. Negative for headaches.   Endo/Heme/Allergies:  Negative for polydipsia.   Psychiatric/Behavioral:  Positive for depression and memory loss. Negative for hallucinations and suicidal ideas. The patient is not nervous/anxious and does not have insomnia.                Health Maintenance         Date Due Completion Date    Pneumococcal Vaccines (Age 65+) (1 of 2 - PCV) Never done ---    Shingles Vaccine (1 of 2) Never done ---    RSV Vaccine (Age 60+ and Pregnant patients) (1 - 1-dose 60+ series) Never done ---    DEXA Scan 05/09/2024 5/9/2019    Influenza Vaccine (1) 09/01/2024 9/27/2022    Override on 11/14/2019: Done    COVID-19 Vaccine (4 - 2023-24 season) 09/01/2024 12/9/2021    Lipid Panel 08/13/2028 8/13/2023    TETANUS VACCINE 01/03/2034 1/3/2024            Advance Care Planning     Date: 09/16/2024    Power of   I initiated the process of voluntary advance care planning today and explained the importance of this process to the patient.  I introduced the concept of advance directives to the patient, as well. Then the patient received detailed information about the importance of designating a Health Care Power of  (HCPOA). She was  "also instructed to communicate with this person about their wishes for future healthcare, should she become sick and lose decision-making capacity. The patient has previously appointed a HCPOA. After our discussion, the patient has decided to complete a HCPOA and has appointed her son, health care agent:  Heladio Vazquez & health care agent number:  [phone]. I encouraged her to communicate with this person about their wishes for future healthcare, should she become sick and lose decision-making capacity.      A total of 5 min was spent on advance care planning, goals of care discussion, emotional support, formulating and communicating prognosis and exploring burden/benefit of various approaches of treatment. This discussion occurred on a fully voluntary basis with the verbal consent of the patient and/or family.         Objective:      Vitals:    09/16/24 1216   BP: (!) 149/101   BP Location: Left arm   Patient Position: Sitting   BP Method: Medium (Manual)   Pulse: 63   SpO2: 97%     Estimated body mass index is 19.17 kg/m² as calculated from the following:    Height as of 5/30/23: 5' 4" (1.626 m).    Weight as of 6/6/23: 50.7 kg (111 lb 10.6 oz).  Physical Exam  Constitutional:       Appearance: Normal appearance. She is normal weight.   HENT:      Head: Normocephalic and atraumatic.      Nose: Nose normal.      Mouth/Throat:      Mouth: Mucous membranes are moist.   Eyes:      Pupils: Pupils are equal, round, and reactive to light.   Cardiovascular:      Rate and Rhythm: Normal rate and regular rhythm.      Pulses: Normal pulses.      Heart sounds: Normal heart sounds. No murmur heard.  Pulmonary:      Effort: Pulmonary effort is normal.      Breath sounds: Normal breath sounds.   Abdominal:      General: Bowel sounds are normal.      Palpations: Abdomen is soft.   Skin:     General: Skin is warm.   Neurological:      General: No focal deficit present.      Mental Status: She is alert and oriented to " person, place, and time.      Cranial Nerves: No cranial nerve deficit.   Psychiatric:         Mood and Affect: Mood normal.           Assessment and Plan:         1. Labile blood pressure  -     Ambulatory referral/consult to Cardiology; Future; Expected date: 09/23/2024  -     LIPID PANEL; Future; Expected date: 09/16/2024  -     HEMOGLOBIN A1C; Future; Expected date: 09/16/2024    2. CKD (chronic kidney disease) stage 4, GFR 15-29 ml/min  Assessment & Plan:  Stable, avoid NSAIDs and nephrotoxic medication   Adequate hydration   Monitor with regular metabolic panel    Orders:  -     LIPID PANEL; Future; Expected date: 09/16/2024  -     HEMOGLOBIN A1C; Future; Expected date: 09/16/2024    3. Abnormal finding of blood chemistry, unspecified  -     HEMOGLOBIN A1C; Future; Expected date: 09/16/2024    4. Spondylosis of lumbar region without myelopathy or radiculopathy  -     Ambulatory referral/consult to Home Health; Future; Expected date: 09/17/2024    5. Parkinson's disease, unspecified whether dyskinesia present, unspecified whether manifestations fluctuate  Assessment & Plan:  Continue current management and follow up with Neurology as needed      6. Chronic obstructive pulmonary disease, unspecified COPD type  Assessment & Plan:  Stable, not currently on any inhalers.      7. White coat syndrome with hypertension  Assessment & Plan:  Blood pressure above goal.  Patient currently not taking any antihypertensive.  She has hydralazine 10 mg p.r.n..  She only takes it if the blood pressure is above 200 systolic.    Her son is very apprehensive about starting her on an antihypertensive as her blood pressure drops frequently and she is on fludrocortisone for history of syncope and orthostatic hypotension.  Unsure how long she is on this medicine   Referral to cardiology plays for further evaluation and treatment of her labile blood pressure  Advised the patient's son to maintain a blood pressure log to be reviewed by  the Cardiology and me at the next visit   Return to clinic in 4 weeks         8. Impaired mobility and activities of daily living  Assessment & Plan:  Referral for home health for PT OT           Follow Up:   Follow up in about 4 weeks (around 10/14/2024).        Dr. Shanti Akasapu [facility]

## 2024-09-16 NOTE — ASSESSMENT & PLAN NOTE
Stable, avoid NSAIDs and nephrotoxic medication   Adequate hydration   Monitor with regular metabolic panel

## 2024-09-16 NOTE — ASSESSMENT & PLAN NOTE
Blood pressure above goal.  Patient currently not taking any antihypertensive.  She has hydralazine 10 mg p.r.n..  She only takes it if the blood pressure is above 200 systolic.    Her son is very apprehensive about starting her on an antihypertensive as her blood pressure drops frequently and she is on fludrocortisone for history of syncope and orthostatic hypotension.  Unsure how long she is on this medicine   Referral to cardiology plays for further evaluation and treatment of her labile blood pressure  Advised the patient's son to maintain a blood pressure log to be reviewed by the Cardiology and me at the next visit   Return to clinic in 4 weeks

## 2024-09-18 ENCOUNTER — TELEPHONE (OUTPATIENT)
Dept: CARDIOLOGY | Facility: CLINIC | Age: 79
End: 2024-09-18
Payer: MEDICARE

## 2024-09-18 ENCOUNTER — PATIENT MESSAGE (OUTPATIENT)
Dept: PRIMARY CARE CLINIC | Facility: CLINIC | Age: 79
End: 2024-09-18
Payer: MEDICARE

## 2024-09-18 DIAGNOSIS — F05 SUNDOWNING: ICD-10-CM

## 2024-09-18 DIAGNOSIS — R00.2 PALPITATIONS: Primary | ICD-10-CM

## 2024-09-19 RX ORDER — QUETIAPINE FUMARATE 25 MG/1
25 TABLET, FILM COATED ORAL NIGHTLY
Qty: 90 TABLET | Refills: 0 | Status: SHIPPED | OUTPATIENT
Start: 2024-09-19

## 2024-09-19 RX ORDER — FLUDROCORTISONE ACETATE 0.1 MG/1
100 TABLET ORAL DAILY
Qty: 90 TABLET | Refills: 0 | Status: SHIPPED | OUTPATIENT
Start: 2024-09-19 | End: 2024-12-18

## 2024-09-19 RX ORDER — PANTOPRAZOLE SODIUM 40 MG/1
40 TABLET, DELAYED RELEASE ORAL DAILY
Qty: 90 TABLET | Refills: 0 | Status: SHIPPED | OUTPATIENT
Start: 2024-09-19 | End: 2024-12-18

## 2024-09-20 ENCOUNTER — PATIENT MESSAGE (OUTPATIENT)
Dept: NEUROLOGY | Facility: CLINIC | Age: 79
End: 2024-09-20
Payer: MEDICARE

## 2024-09-24 ENCOUNTER — PATIENT MESSAGE (OUTPATIENT)
Dept: PRIMARY CARE CLINIC | Facility: CLINIC | Age: 79
End: 2024-09-24
Payer: MEDICARE

## 2024-09-25 ENCOUNTER — PATIENT MESSAGE (OUTPATIENT)
Dept: PRIMARY CARE CLINIC | Facility: CLINIC | Age: 79
End: 2024-09-25
Payer: MEDICARE

## 2024-09-25 DIAGNOSIS — R63.4 UNINTENTIONAL WEIGHT LOSS: Primary | ICD-10-CM

## 2024-09-25 DIAGNOSIS — Z00.00 ENCOUNTER FOR MEDICARE ANNUAL WELLNESS EXAM: ICD-10-CM

## 2024-09-25 DIAGNOSIS — I12.9 HYPERTENSIVE CHRONIC KIDNEY DISEASE WITH STAGE 1 THROUGH STAGE 4 CHRONIC KIDNEY DISEASE, OR UNSPECIFIED CHRONIC KIDNEY DISEASE: ICD-10-CM

## 2024-09-25 RX ORDER — RIVASTIGMINE 4.6 MG/24H
1 PATCH, EXTENDED RELEASE TRANSDERMAL DAILY
Qty: 90 PATCH | Refills: 0 | Status: SHIPPED | OUTPATIENT
Start: 2024-09-25 | End: 2024-09-25 | Stop reason: SDUPTHER

## 2024-09-26 RX ORDER — RIVASTIGMINE 4.6 MG/24H
1 PATCH, EXTENDED RELEASE TRANSDERMAL DAILY
Qty: 90 PATCH | Refills: 0 | Status: SHIPPED | OUTPATIENT
Start: 2024-09-26 | End: 2024-12-25

## 2024-09-28 ENCOUNTER — PATIENT MESSAGE (OUTPATIENT)
Dept: PRIMARY CARE CLINIC | Facility: CLINIC | Age: 79
End: 2024-09-28
Payer: MEDICARE

## 2024-10-03 ENCOUNTER — HOSPITAL ENCOUNTER (OUTPATIENT)
Dept: CARDIOLOGY | Facility: CLINIC | Age: 79
Discharge: HOME | End: 2024-10-03
Payer: MEDICARE

## 2024-10-03 ENCOUNTER — OFFICE VISIT (OUTPATIENT)
Dept: CARDIOLOGY | Facility: CLINIC | Age: 79
End: 2024-10-03
Payer: MEDICARE

## 2024-10-03 VITALS
SYSTOLIC BLOOD PRESSURE: 170 MMHG | DIASTOLIC BLOOD PRESSURE: 98 MMHG | HEART RATE: 54 BPM | BODY MASS INDEX: 19.24 KG/M2 | OXYGEN SATURATION: 95 % | WEIGHT: 119.69 LBS | HEIGHT: 66 IN

## 2024-10-03 DIAGNOSIS — R09.89 LABILE BLOOD PRESSURE: ICD-10-CM

## 2024-10-03 DIAGNOSIS — R00.2 PALPITATIONS: ICD-10-CM

## 2024-10-03 DIAGNOSIS — I50.32 CHRONIC DIASTOLIC CONGESTIVE HEART FAILURE: ICD-10-CM

## 2024-10-03 DIAGNOSIS — I82.4Y9 DEEP VEIN THROMBOSIS (DVT) OF PROXIMAL LOWER EXTREMITY, UNSPECIFIED CHRONICITY, UNSPECIFIED LATERALITY: Primary | ICD-10-CM

## 2024-10-03 LAB
OHS QRS DURATION: 80 MS
OHS QTC CALCULATION: 418 MS

## 2024-10-03 PROCEDURE — 99999 PR PBB SHADOW E&M-EST. PATIENT-LVL V: CPT | Mod: PBBFAC,,, | Performed by: STUDENT IN AN ORGANIZED HEALTH CARE EDUCATION/TRAINING PROGRAM

## 2024-10-03 PROCEDURE — 3077F SYST BP >= 140 MM HG: CPT | Mod: CPTII,S$GLB,, | Performed by: STUDENT IN AN ORGANIZED HEALTH CARE EDUCATION/TRAINING PROGRAM

## 2024-10-03 PROCEDURE — 1159F MED LIST DOCD IN RCRD: CPT | Mod: CPTII,S$GLB,, | Performed by: STUDENT IN AN ORGANIZED HEALTH CARE EDUCATION/TRAINING PROGRAM

## 2024-10-03 PROCEDURE — 99204 OFFICE O/P NEW MOD 45 MIN: CPT | Mod: S$GLB,,, | Performed by: STUDENT IN AN ORGANIZED HEALTH CARE EDUCATION/TRAINING PROGRAM

## 2024-10-03 PROCEDURE — 1125F AMNT PAIN NOTED PAIN PRSNT: CPT | Mod: CPTII,S$GLB,, | Performed by: STUDENT IN AN ORGANIZED HEALTH CARE EDUCATION/TRAINING PROGRAM

## 2024-10-03 PROCEDURE — 93010 ELECTROCARDIOGRAM REPORT: CPT | Mod: S$GLB,,, | Performed by: INTERNAL MEDICINE

## 2024-10-03 PROCEDURE — 93005 ELECTROCARDIOGRAM TRACING: CPT | Mod: S$GLB,,, | Performed by: STUDENT IN AN ORGANIZED HEALTH CARE EDUCATION/TRAINING PROGRAM

## 2024-10-03 PROCEDURE — 1157F ADVNC CARE PLAN IN RCRD: CPT | Mod: CPTII,S$GLB,, | Performed by: STUDENT IN AN ORGANIZED HEALTH CARE EDUCATION/TRAINING PROGRAM

## 2024-10-03 PROCEDURE — 3080F DIAST BP >= 90 MM HG: CPT | Mod: CPTII,S$GLB,, | Performed by: STUDENT IN AN ORGANIZED HEALTH CARE EDUCATION/TRAINING PROGRAM

## 2024-10-03 NOTE — PROGRESS NOTES
"    PCP - Sara Ku MD  Referring Physician:     Subjective:   Patient ID:  Madeline Reilly is a 78 y.o. female with past medical history of:   dementia, HTN, HFpEF, hyperlipidemia,CKD, Parkinson's disease, GERD     Patient presenting as a referral from Internal Medicine for labile blood pressure.  Patient reports that she has had high blood pressure for years.  At some point in the last few years she started having frequent falls and orthostatic hypotension.  She was started on midodrine at 1st and then was taken off of this and started on Florinef.  Since being put on Florinef she has had hypertension.  Review of logs today shows systolic blood pressures between 110 and 190.  Only 1 low blood pressure with systolic below 80 recorded.  Her son who accompanied her to the visit states that she is sometimes lightheaded when she gets up from a seated position.  She lives at the home with her son and daughter-in-law.    History:     Social History     Tobacco Use    Smoking status: Never     Passive exposure: Never    Smokeless tobacco: Never   Substance Use Topics    Alcohol use: No     Family History   Problem Relation Name Age of Onset    Cancer Maternal Grandmother          "abdomen"    Cancer Maternal Aunt          "abdominal cancer"       Meds:     Review of patient's allergies indicates:   Allergen Reactions    Lisinopril Shortness Of Breath, Diarrhea and Nausea Only     All side affects from medication    Beef containing products     Hydrochlorothiazide     Pork/porcine containing products     Reserpine     Statins-hmg-coa reductase inhibitors Diarrhea and Nausea And Vomiting    Toprol xl [metoprolol succinate] Other (See Comments)     Low blood preasure    Aspirin Nausea And Vomiting    Hydralazine-reserpin-hcthiazid Diarrhea       Current Outpatient Medications:     acetaminophen (TYLENOL) 650 MG TbSR, Take 1 tablet (650 mg total) by mouth every 8 (eight) hours as needed (back pain)., Disp: 60 " "tablet, Rfl: 1    bismuth subsalicylate (BISMUTH ORAL), Take 15 mLs by mouth every 6 (six) hours as needed., Disp: , Rfl:     carbidopa-levodopa  mg (SINEMET)  mg per tablet, Take 1 tablet by mouth 3 (three) times daily., Disp: 90 tablet, Rfl: 0    diaper,brief,adult,disposable Misc, Use as dictioned, Disp: 96 each, Rfl: 5    diclofenac sodium (VOLTAREN) 1 % Gel, Apply 2 g topically 4 (four) times daily., Disp: 200 g, Rfl: 2    diphenhydrAMINE (BENADRYL) 25 mg capsule, Take 25 mg by mouth every 6 (six) hours as needed for Itching., Disp: , Rfl:     EScitalopram oxalate (LEXAPRO) 10 MG tablet, Take 1 tablet (10 mg total) by mouth once daily., Disp: 30 tablet, Rfl: 0    fludrocortisone (FLORINEF) 0.1 mg Tab, Take 1 tablet (100 mcg total) by mouth once daily., Disp: 90 tablet, Rfl: 0    hydrALAZINE (APRESOLINE) 10 MG tablet, Take 10 mg by mouth 3 (three) times daily., Disp: , Rfl:     multivitamin (THERAGRAN) per tablet, Take 1 tablet by mouth., Disp: , Rfl:     pantoprazole (PROTONIX) 40 MG tablet, Take 1 tablet (40 mg total) by mouth once daily., Disp: 90 tablet, Rfl: 0    QUEtiapine (SEROQUEL) 25 MG Tab, Take 1 tablet (25 mg total) by mouth every evening. Take this medication ~45 minutes prior to going to bed every night, Disp: 90 tablet, Rfl: 0    rivastigmine (EXELON) 4.6 mg/24 hour PT24, Place 1 patch onto the skin once daily., Disp: 90 patch, Rfl: 0    amLODIPine (NORVASC) 10 MG tablet, Take 1 tablet (10 mg total) by mouth once daily. (Patient not taking: Reported on 10/3/2024), Disp: 30 tablet, Rfl: 0      Objective:   BP (!) 170/98   Pulse (!) 54   Ht 5' 6" (1.676 m)   Wt 54.3 kg (119 lb 11.4 oz)   SpO2 95%   BMI 19.32 kg/m²     Physical Exam  Gen: No apparent distress, resting comfortably  HEENT: Pupils equal and reactive to light  Cardio: Regular rate, point of maximal impulse not displaced, no murmur noted, 2+ radial pulses bilaterally, 2+ DP pulses bilaterally  Resp: CTAB, no " "wheezing  Abd: Soft, non-tender, non-distended  Skin: Warm, dry, no peripheral edema noted  Neuro: Alert and oriented x3  Psych: Normal mood and affect      Labs:     Lab Results   Component Value Date     04/14/2023     04/03/2023    K 4.4 04/14/2023    K 4.4 04/03/2023     04/03/2023    CO2 24 04/14/2023    CO2 24 04/03/2023    BUN 33.0 (H) 04/14/2023    BUN 34 (H) 04/03/2023    BUN 15 09/29/2015    CREATININE 1.30 (H) 04/14/2023    CREATININE 1.9 (H) 04/03/2023    GLUCOSE 88 04/14/2023    ANIONGAP 12 04/14/2023    ANIONGAP 11 04/03/2023    ANIONGAP 18 09/29/2015     Lab Results   Component Value Date    HGBA1C 5.5 08/13/2023    HGBA1C 5.5 04/30/2019     No results found for: "BNP", "BNPTRIAGEBLO"    Lab Results   Component Value Date    WBC 5.0 01/13/2024    WBC 6.20 04/03/2023    HGB 11.3 (L) 01/13/2024    HGB 11.6 (L) 04/03/2023    HCT 35.1 01/13/2024    HCT 36.8 (L) 04/03/2023     04/03/2023    GRAN 3.1 04/03/2023    GRAN 50.4 04/03/2023     Lab Results   Component Value Date    CHOL 236 (H) 06/22/2022    HDL 73 06/22/2022    LDLCALC 151.2 06/22/2022    LDLCALC 143 (H) 09/29/2015    TRIG 59 06/22/2022       Lab Results   Component Value Date     04/14/2023     04/03/2023    K 4.4 04/14/2023    K 4.4 04/03/2023     04/03/2023    CO2 24 04/14/2023    CO2 24 04/03/2023    BUN 33.0 (H) 04/14/2023    BUN 34 (H) 04/03/2023    BUN 15 09/29/2015    CREATININE 1.30 (H) 04/14/2023    CREATININE 1.9 (H) 04/03/2023    GLUCOSE 88 04/14/2023    ANIONGAP 12 04/14/2023    ANIONGAP 11 04/03/2023    ANIONGAP 18 09/29/2015     Lab Results   Component Value Date    HGBA1C 5.5 08/13/2023    HGBA1C 5.5 04/30/2019     No results found for: "BNP", "BNPTRIAGEBLO" Lab Results   Component Value Date    WBC 5.0 01/13/2024    WBC 6.20 04/03/2023    HGB 11.3 (L) 01/13/2024    HGB 11.6 (L) 04/03/2023    HCT 35.1 01/13/2024    HCT 36.8 (L) 04/03/2023     04/03/2023    GRAN 3.1 04/03/2023    " GRAN 50.4 04/03/2023     Lab Results   Component Value Date    CHOL 236 (H) 06/22/2022    HDL 73 06/22/2022    LDLCALC 151.2 06/22/2022    LDLCALC 143 (H) 09/29/2015    TRIG 59 06/22/2022                Cardiovascular Imaging:     Echo 9/10/2019:  Normal left ventricular systolic function. The estimated ejection fraction is 60%  Grade II (moderate) left ventricular diastolic dysfunction consistent with pseudonormalization. Elevated left atrial pressure.  No wall motion abnormalities.  Concentric left ventricular remodeling.  Normal right ventricular systolic function.  The estimated PA systolic pressure is 27 mm Hg  Normal central venous pressure (3 mm Hg).  Small circumferential pericardial effusion. No tamponade physiology.     Stress test:     LHC:    Assessment & Plan:     Labile blood pressure  Likely due to underlying autonomic dysfunction   We will attempt to wean off of Florinef.  Take 1/2 tablet for 7 days and then take 1/4 tablet for additional 7 days and then off   Keep blood pressure log while weaning off of Florinef and send in in 2 weeks  Continue current antihypertensive meds  Patient educated on wearing compression stockings up to the mid thigh and staying hydrated    2. HFpEF  Patient euvolemic on exam today  Continue current medications    3. Dementia/Parkinson's  Stable, followed by PCP    RTC to see NP in one month for BP check-in. RTC to see me in 3-6 months.      Signed:  Mitch Hernández MD  [facility]

## 2024-10-04 ENCOUNTER — TELEPHONE (OUTPATIENT)
Dept: PRIMARY CARE CLINIC | Facility: CLINIC | Age: 79
End: 2024-10-04
Payer: MEDICARE

## 2024-10-07 ENCOUNTER — PATIENT MESSAGE (OUTPATIENT)
Dept: PRIMARY CARE CLINIC | Facility: CLINIC | Age: 79
End: 2024-10-07
Payer: MEDICARE

## 2024-10-07 NOTE — TELEPHONE ENCOUNTER
Spoke to patients son, mr. Leija today. Patient is not agitated today but she has been agitated this weekend and also feeling weak. They are weaning the florinef per recommendations from cardiology since friday. She did fall twice this weekend. Been challenging to get her to walk on her own this weekend. Per son, BP was 103 yesterday but was fine today. Denies urinary issues, cough, fever or pain. Will monitor today. Asked them to keep an eye on the BP to make sure not too low and ask his wife about changes in urine.

## 2024-10-16 PROCEDURE — G0180 MD CERTIFICATION HHA PATIENT: HCPCS | Mod: ,,, | Performed by: INTERNAL MEDICINE

## 2024-10-21 ENCOUNTER — PATIENT MESSAGE (OUTPATIENT)
Dept: PRIMARY CARE CLINIC | Facility: CLINIC | Age: 79
End: 2024-10-21

## 2024-10-21 ENCOUNTER — OFFICE VISIT (OUTPATIENT)
Dept: PRIMARY CARE CLINIC | Facility: CLINIC | Age: 79
End: 2024-10-21
Payer: MEDICARE

## 2024-10-21 VITALS
HEART RATE: 73 BPM | BODY MASS INDEX: 19.46 KG/M2 | DIASTOLIC BLOOD PRESSURE: 78 MMHG | WEIGHT: 120.56 LBS | SYSTOLIC BLOOD PRESSURE: 138 MMHG | OXYGEN SATURATION: 94 %

## 2024-10-21 DIAGNOSIS — R30.9 URINARY PAIN: Primary | ICD-10-CM

## 2024-10-21 DIAGNOSIS — I50.32 CHRONIC DIASTOLIC CONGESTIVE HEART FAILURE: ICD-10-CM

## 2024-10-21 DIAGNOSIS — R09.89 LABILE BLOOD PRESSURE: ICD-10-CM

## 2024-10-21 DIAGNOSIS — G20.A2 PARKINSON'S DISEASE WITHOUT DYSKINESIA, WITH FLUCTUATING MANIFESTATIONS: ICD-10-CM

## 2024-10-21 DIAGNOSIS — I50.32 HYPERTENSIVE HEART AND KIDNEY DISEASE WITH CHRONIC DIASTOLIC CONGESTIVE HEART FAILURE AND STAGE 3A CHRONIC KIDNEY DISEASE: ICD-10-CM

## 2024-10-21 DIAGNOSIS — I13.0 HYPERTENSIVE HEART AND KIDNEY DISEASE WITH CHRONIC DIASTOLIC CONGESTIVE HEART FAILURE AND STAGE 3A CHRONIC KIDNEY DISEASE: ICD-10-CM

## 2024-10-21 DIAGNOSIS — N18.31 HYPERTENSIVE HEART AND KIDNEY DISEASE WITH CHRONIC DIASTOLIC CONGESTIVE HEART FAILURE AND STAGE 3A CHRONIC KIDNEY DISEASE: ICD-10-CM

## 2024-10-21 LAB
ALBUMIN/CREAT UR: 47.4 UG/MG (ref 0–30)
BACTERIA #/AREA URNS AUTO: ABNORMAL /HPF
BILIRUB UR QL STRIP: NEGATIVE
CLARITY UR REFRACT.AUTO: ABNORMAL
COLOR UR AUTO: YELLOW
CREAT UR-MCNC: 232 MG/DL (ref 15–325)
GLUCOSE UR QL STRIP: NEGATIVE
HGB UR QL STRIP: NEGATIVE
HYALINE CASTS UR QL AUTO: 35 /LPF
KETONES UR QL STRIP: ABNORMAL
LEUKOCYTE ESTERASE UR QL STRIP: ABNORMAL
MICROALBUMIN UR DL<=1MG/L-MCNC: 110 UG/ML
MICROSCOPIC COMMENT: ABNORMAL
NITRITE UR QL STRIP: NEGATIVE
PH UR STRIP: 6 [PH] (ref 5–8)
PROT UR QL STRIP: ABNORMAL
RBC #/AREA URNS AUTO: 5 /HPF (ref 0–4)
SP GR UR STRIP: 1.02 (ref 1–1.03)
SQUAMOUS #/AREA URNS AUTO: 8 /HPF
URN SPEC COLLECT METH UR: ABNORMAL
WBC #/AREA URNS AUTO: 32 /HPF (ref 0–5)

## 2024-10-21 PROCEDURE — 82570 ASSAY OF URINE CREATININE: CPT

## 2024-10-21 PROCEDURE — 81001 URINALYSIS AUTO W/SCOPE: CPT

## 2024-10-21 PROCEDURE — 99999 PR PBB SHADOW E&M-EST. PATIENT-LVL IV: CPT | Mod: PBBFAC,,,

## 2024-10-21 PROCEDURE — 82043 UR ALBUMIN QUANTITATIVE: CPT

## 2024-10-21 PROCEDURE — 87086 URINE CULTURE/COLONY COUNT: CPT

## 2024-10-21 NOTE — PROGRESS NOTES
[facility]      Subjective     Patient Name: Madeline Reilly  YOB: 1945  Patient Age: 78 y.o.  Patient Sex: female  Patient Phone: [phone]  PCP: Sara Ku MD  Last PCP Appointment: 9/16/2024    Madeline Reilly is a 78 y.o. female with dementia, HTN, HFrEF, hyperlipidemia,CKD3a, Parkinson's disease, and GERD presenting with her son and daughter in law with worsening abdominal pain chronic labile blood pressure.       CARDIOVASCULAR:   Patient has been weaned off BP medications, most recently stopping fludrocortisone 0.1mg daily by Cardiology. Pt has been on the mineralocorticoid for suspected adrenal insufficiency. She was PRN Hydralazine for SBP >200mmHg. Review of logs today shows systolic blood pressures between . She lives at the home with her son and daughter-in-law. No acute chest pain or SOB reported    NEUROLOGY:  Symptoms of lightheadedness/dizziness lasting for minutes occur most often after awakening from sleep. Pt reported that she has to remain in bed 2/t concerns of falls. She stated that she feels as if she will pass out if she stands. Anxiety and imbalance noted on standing. Pt using Rolator in clinic but often uses a cane while at home. No recent falls reported but pt remains conservative with ambulation. Pt also reports change in voice (softening) over the past year. She is treated with carbidopa-levodopa for Parkinson's and is followed by Neurology for management.    NEPHROLOGY/UROLOGY:  Abdominal pain present >2weeks. Pain is suprapubic and radiates to the right flank. Discomfort worsened on urination. PMH of pan-sensitive Ecoli bacturia. No discoloration of urine reported. No fevers, chills, N/V, or changes in bowel habits.    Health Maintenance and Care Gaps  TDap is UTD.   Influenza is UTD.  Pneumovax is not UTD.   Zostavax is not UTD.       Covid is not UTD.  RSV is not UTD.  Colonoscopy is UTD.   DEXA  is not UTD.    Screening:  Hepatitis C is  UTD in pts born between 8408-0485.   HIV is UTD     4Ms for Medical Decision-Making in Older Adults    Last Completed EAWV: None    MOBILITY:  Get Up and Go:       No data to display              Activities of Daily Living:       No data to display              Whisper Test:       No data to display              Disability Status:       No data to display              Nutrition Screening:       No data to display             Screening Score: 0-7 Malnourished, 8-11 At Risk, 12-14 Normal  Fall Risk:      10/21/2024     1:00 PM 6/6/2023     1:20 PM 5/30/2023    10:45 AM   Fall Risk Assessment - Outpatient   Mobility Status Ambulatory Ambulatory w/ assistance Ambulatory w/ assistance   Number of falls 1 2+ 2+   Identified as fall risk False True True           MENTATION:   Depression Patient Health Questionnaire:      9/16/2024    12:22 PM   Depression Patient Health Questionnaire   Over the last two weeks how often have you been bothered by little interest or pleasure in doing things More than half the days   Over the last two weeks how often have you been bothered by feeling down, depressed or hopeless Several days   PHQ-2 Total Score 3   PHQ-9 Total Score 13   PHQ-9 Interpretation Moderate     Has Dementia Dx: Yes  Has Anxiety Dx: No    Cognitive Function Screening:       No data to display              Cognitive Function Screening Total - Less than 4 = Abnormal,  Greater than or equal to 4 = Normal        MEDICATIONS:  High Risk Medications:  Total Active Medications: 2  EScitalopram oxalate - 10 MG  QUEtiapine Tab - 25 MG    WHAT MATTERS MOST:  Advance Care Planning   ACP Status:   Patient has had an ACP conversation  Living Will: No  Power of : Yes  [place]: No    What is most important right now is to focus on avoiding the hospital and remaining as independent as possible    Accordingly, we have decided that the best plan to meet the patient's goals includes continuing with treatment    What matters most  to patient today is: resolving abdominal pain and symptoms of lightheaded/dizziness.                 Social History     Socioeconomic History    Marital status:    Tobacco Use    Smoking status: Never     Passive exposure: Never    Smokeless tobacco: Never   Substance and Sexual Activity    Alcohol use: No    Drug use: No   Social History Narrative    Retired .     Social Drivers of Health     Financial Resource Strain: Low Risk  (9/2/2024)    Overall Financial Resource Strain (CARDIA)     Difficulty of Paying Living Expenses: Not hard at all   Food Insecurity: No Food Insecurity (9/2/2024)    Hunger Vital Sign     Worried About Running Out of Food in the Last Year: Never true     Ran Out of Food in the Last Year: Never true   Transportation Needs: No Transportation Needs (8/17/2024)    Received from [facility] - Transportation     Lack of Transportation (Medical): No     Lack of Transportation (Non-Medical): No   Physical Activity: Insufficiently Active (9/2/2024)    Exercise Vital Sign     Days of Exercise per Week: 3 days     Minutes of Exercise per Session: 30 min   Stress: Stress Concern Present (9/2/2024)    [employer] [place] [employer] - Occupational Stress Questionnaire     Feeling of Stress : To some extent   Housing Stability: Unknown (9/2/2024)    Housing Stability Vital Sign     Unable to Pay for Housing in the Last Year: No       Past Medical History:   Diagnosis Date    Arthritis     Asthma, chronic     Cataract     Hypertension     Orthostatic hypotension        Prior to Admission medications    Medication Sig Start Date End Date Taking? Authorizing Provider   acetaminophen (TYLENOL) 650 MG TbSR Take 1 tablet (650 mg total) by mouth every 8 (eight) hours as needed (back pain). 5/30/23  Yes Rita Cowan MD   bismuth subsalicylate (BISMUTH ORAL) Take 15 mLs by mouth every 6 (six) hours as needed.   Yes Provider, Historical   carbidopa-levodopa  mg  (SINEMET)  mg per tablet Take 1 tablet by mouth 3 (three) times daily. 7/19/23  Yes Aidee Barbosa MD   diaper,brief,adult,disposable Misc Use as dictioned 7/29/22  Yes Aidee Barbosa MD   diclofenac sodium (VOLTAREN) 1 % Gel Apply 2 g topically 4 (four) times daily. 5/30/23  Yes Rita Cowan MD   diphenhydrAMINE (BENADRYL) 25 mg capsule Take 25 mg by mouth every 6 (six) hours as needed for Itching.   Yes Provider, Historical   EScitalopram oxalate (LEXAPRO) 10 MG tablet Take 1 tablet (10 mg total) by mouth once daily. 6/28/23  Yes Aidee Barbosa MD   hydrALAZINE (APRESOLINE) 10 MG tablet Take 10 mg by mouth 3 (three) times daily.   Yes Provider, Historical   multivitamin (THERAGRAN) per tablet Take 1 tablet by mouth. 4/23/23  Yes Provider, Historical   pantoprazole (PROTONIX) 40 MG tablet Take 1 tablet (40 mg total) by mouth once daily. 9/19/24 12/18/24 Yes Sara Ku MD   QUEtiapine (SEROQUEL) 25 MG Tab Take 1 tablet (25 mg total) by mouth every evening. Take this medication ~45 minutes prior to going to bed every night 9/19/24  Yes Sara Ku MD   rivastigmine (EXELON) 4.6 mg/24 hour PT24 Place 1 patch onto the skin once daily. 9/26/24 12/25/24 Yes Sara Ku MD   amLODIPine (NORVASC) 10 MG tablet Take 1 tablet (10 mg total) by mouth once daily.  Patient not taking: Reported on 10/21/2024 6/28/23 10/21/24  Aidee Barbosa MD   fludrocortisone (FLORINEF) 0.1 mg Tab Take 1 tablet (100 mcg total) by mouth once daily. 9/19/24 10/21/24  Sara Ku MD       OBJECTIVE:     Vitals:    10/21/24 1258 10/21/24 1342 10/21/24 1343   BP: (!) 160/100 (!) 140/90 138/78   BP Location: Left arm     Patient Position: Sitting Sitting    Pulse: 73     SpO2: (!) 94%     Weight: 54.7 kg (120 lb 9.5 oz)         Body mass index is 19.46 kg/m².     PHYSICAL EXAM  GEN - A+OX4, NAD   HEENT - PERRL, EOMI, OP clear  Neck - No thyromegaly or cervical LAD. No thyroid masses  felt.  CV - RRR, no m/r   Chest - CTAB, no wheezing or rhonchi  Abd - S/NT/ND/+BS. No tenderness to palpation.  Ext - 2+BDP and radial pulses. No C/C/E.  MSK - normal ROM, no tenderness  Neuro - 5/5 BUE & BLE strength. Decreased coordination/balance of the lower extremities on transfer and ambulation.  LN - No axillary or inguinal LAD appreciated.  Skin - No rash.     LABS  Lab Results   Component Value Date    WBC 5.0 01/13/2024    HGB 11.3 (L) 01/13/2024    HCT 35.1 01/13/2024    MCV 91.4 01/13/2024     04/03/2023         CMP  Sodium   Date Value Ref Range Status   04/14/2023 142 136 - 145 mmol/L Final   04/03/2023 140 136 - 145 mmol/L Final     Potassium   Date Value Ref Range Status   04/14/2023 4.4 3.4 - 5.2 mmol/L Final   04/03/2023 4.4 3.5 - 5.1 mmol/L Final     Chloride   Date Value Ref Range Status   04/03/2023 105 95 - 110 mmol/L Final     CO2   Date Value Ref Range Status   04/03/2023 24 23 - 29 mmol/L Final     Carbon Dioxide   Date Value Ref Range Status   04/14/2023 24 22 - 29 mmol/L Final     Glucose   Date Value Ref Range Status   04/03/2023 95 70 - 110 mg/dL Final     BUN   Date Value Ref Range Status   04/14/2023 33.0 (H) 7.0 - 18.7 mg/dL Final   04/03/2023 34 (H) 8 - 23 mg/dL Final   09/29/2015 15 7 - 21 mg/dL Final     Creatinine   Date Value Ref Range Status   04/14/2023 1.30 (H) 0.57 - 1.11 mg/dL Final   04/03/2023 1.9 (H) 0.5 - 1.4 mg/dL Final     Calcium   Date Value Ref Range Status   04/14/2023 9.3 8.4 - 10.2 mg/dL Final   04/03/2023 9.8 8.7 - 10.5 mg/dL Final     Total Protein   Date Value Ref Range Status   09/06/2022 7.4 6.0 - 8.4 g/dL Final     Albumin   Date Value Ref Range Status   09/06/2022 4.2 3.5 - 5.2 g/dL Final     Total Bilirubin   Date Value Ref Range Status   09/06/2022 0.7 0.1 - 1.0 mg/dL Final     Comment:     For infants and newborns, interpretation of results should be based  on gestational age, weight and in agreement with clinical  observations.    Premature  Infant recommended reference ranges:  Up to 24 hours.............<8.0 mg/dL  Up to 48 hours............<12.0 mg/dL  3-5 days..................<15.0 mg/dL  6-29 days.................<15.0 mg/dL       Alkaline Phosphatase   Date Value Ref Range Status   09/06/2022 40 (L) 55 - 135 U/L Final     AST   Date Value Ref Range Status   09/06/2022 17 10 - 40 U/L Final     ALT   Date Value Ref Range Status   09/06/2022 <5 (L) 10 - 44 U/L Final     Anion Gap   Date Value Ref Range Status   04/14/2023 12 mmol/L Final   04/03/2023 11 8 - 16 mmol/L Final   09/29/2015 18 9 - 18 mEq/L Final     eGFR   Date Value Ref Range Status   04/03/2023 26.5 (A) >60 mL/min/1.73 m^2 Final       ASSESSMENT & PLAN:   Ms. Madeline Reilly is a 78 y.o. female who was seen in clinic today for follow up of symptoms of abdominal pain and labile blood pressures. Pt has been off all scheduled medications for 5 days. PRN Hydralazine available for BP >200mmHg. Orthostatic hypotension noted in clinic, pt using rolator for ambulation/transfers. Abdominal/flank pain present in setting of possible UTI, will follow up urine analysis/cultures. For reflux symptoms, recommended PPI 30-60min before meal(s). Pt to be scheduled for a 4 week follow up.           1. Urinary pain  Overview:  - 1-2 weeks of suprapubic pain reported with urination  - Pain radiates to right flank  - No history of renal stones  - Remote history of pan-sensitive Ecoli bacturia    Assessment & Plan:  - Ordered UA with reflex  - PE negative for pain with palpation  - Follow up urine labs with low threshold for starting antibiotic therapy.    Orders:  -     Urinalysis, Reflex to Urine Culture Urine, Clean Catch; Future; Expected date: 10/21/2024  -     Microalbumin/creatinine urine ratio  -     Urinalysis Microscopic  -     Urine culture    2. Labile blood pressure  Overview:  - PMH of labile BP  - Followed by Cardiology  - Discontinued fludrocortisone and anti-hypertensive  medications    Assessment & Plan:  BP Readings from Last 8 Encounters:   10/21/24 138/78   10/03/24 (!) 170/98   09/16/24 (!) 149/101   06/06/23 (!) 152/86   05/30/23 126/80   04/06/23 (!) 186/96   12/06/22 (!) 154/108   09/07/22 (!) 184/96     - Reviewed BP logs; significant for labile SBP and DBP but HR relatively stable wnl  - Orthostatic hypotension in clinic  - PMH negative for adrenal insufficiency  - Prior lab results with normal AM cortisol, renin and aldosterone levels  - Fludrocortisone discontinued  - Reviewed/updated med list  - Continue follow up with cardiology  - Strict return to clinic precautions discussed       3. Parkinson's disease without dyskinesia, with fluctuating manifestations  Overview:  - Managed with carbidopa-levodopa 25-100mg TID.  - Followed by Urology    Assessment & Plan:  - Symptoms appear to be controlled on current medication/dose  - No adverse effects noted on examination  - Continue current regimen  - Follow up with Neurology for medication management and physical therapy recs      4. Hypertensive heart and kidney disease with chronic diastolic congestive heart failure and stage 3a chronic kidney disease  Overview:  - Pt off anti-hypertensive medications while trending BP  - Followed by Cardiology  - Renal function stable with CKD 3a  - BP >200 managed with PRN hydralazine 25mg    Assessment & Plan:  Lab Results   Component Value Date    ESTGFRAFRICA 42 (L) 04/14/2023    ESTGFRAFRICA 38 (L) 07/28/2022    ESTGFRAFRICA 35.3 (A) 07/21/2022    ESTGFRAFRICA 32.8 (A) 06/22/2022    ESTGFRAFRICA 46.0 (A) 11/16/2020    ESTGFRAFRICA >60.0 04/30/2019    ESTGFRAFRICA 62 07/30/2018    ESTGFRAFRICA 71 06/21/2016     - Follow up with Cardiology for BP management, with fludrocortisone discontinued  - Daily BP measurements/recordings  - Continue with PRN Hydralazine  - Avoid nephrotoxic medications  - Trend GFR  - Manage UTI per assessment and plan  - Urine microalbumin/creatinine ratio to  assess for proteinuria  - Consider ACEi/ARB for elevated UMAC in setting of CKD        5. Chronic diastolic congestive heart failure  Assessment & Plan:  Results for TTE orders placed in visit on 09/19/19  · Normal left ventricular systolic function. The estimated ejection fraction is 60%  · Grade II (moderate) left ventricular diastolic dysfunction consistent with pseudonormalization. Elevated left atrial pressure.  · No wall motion abnormalities.  · Concentric left ventricular remodeling.  · Normal right ventricular systolic function.  · The estimated PA systolic pressure is 27 mm Hg  · Normal central venous pressure (3 mm Hg).  · Small circumferential pericardial effusion. No tamponade physiology.  - Pt is not on medical management currently, while trending labile BP  - Monitor strict I&Os and daily weights.    - Daily BP measurements/logs  - Low sodium diet  - Place on fluid restriction of 1.5 L.   - Cardiology is following  - The patient's volume status is at her baseline           Follow up in about 4 weeks (around 11/18/2024).     All questions answered. Pt to call/message the [facility] for any additional concerns        Demarcus Julian MD  [facility] [place]

## 2024-10-22 ENCOUNTER — TELEPHONE (OUTPATIENT)
Dept: PRIMARY CARE CLINIC | Facility: CLINIC | Age: 79
End: 2024-10-22
Payer: MEDICARE

## 2024-10-22 ENCOUNTER — PATIENT MESSAGE (OUTPATIENT)
Dept: PRIMARY CARE CLINIC | Facility: CLINIC | Age: 79
End: 2024-10-22
Payer: MEDICARE

## 2024-10-22 DIAGNOSIS — R30.9 URINARY PAIN: Primary | ICD-10-CM

## 2024-10-22 PROBLEM — I50.32 HYPERTENSIVE HEART AND KIDNEY DISEASE WITH CHRONIC DIASTOLIC CONGESTIVE HEART FAILURE AND STAGE 3A CHRONIC KIDNEY DISEASE: Status: ACTIVE | Noted: 2024-10-22

## 2024-10-22 PROBLEM — R09.89 LABILE BLOOD PRESSURE: Status: ACTIVE | Noted: 2024-10-22

## 2024-10-22 PROBLEM — N18.31 HYPERTENSIVE HEART AND KIDNEY DISEASE WITH CHRONIC DIASTOLIC CONGESTIVE HEART FAILURE AND STAGE 3A CHRONIC KIDNEY DISEASE: Status: ACTIVE | Noted: 2024-10-22

## 2024-10-22 PROBLEM — I13.0 HYPERTENSIVE HEART AND KIDNEY DISEASE WITH CHRONIC DIASTOLIC CONGESTIVE HEART FAILURE AND STAGE 3A CHRONIC KIDNEY DISEASE: Status: ACTIVE | Noted: 2024-10-22

## 2024-10-22 RX ORDER — CEFPODOXIME PROXETIL 100 MG/1
100 TABLET, FILM COATED ORAL EVERY 12 HOURS
Qty: 14 TABLET | Refills: 0 | Status: SHIPPED | OUTPATIENT
Start: 2024-10-22 | End: 2024-10-29

## 2024-10-22 NOTE — TELEPHONE ENCOUNTER
Spoke with pt's son, Heladio, informed him about urine lab results, a Rx has been sent to pharmacy, still waiting on culture, will keep him updated with any news. Mr. Leija verbalized understood.

## 2024-10-22 NOTE — ASSESSMENT & PLAN NOTE
- Symptoms appear to be controlled on current medication/dose  - No adverse effects noted on examination  - Continue current regimen  - Follow up with Neurology for medication management and physical therapy recs

## 2024-10-22 NOTE — ASSESSMENT & PLAN NOTE
Lab Results   Component Value Date    ESTGFRAFRICA 42 (L) 04/14/2023    ESTGFRAFRICA 38 (L) 07/28/2022    ESTGFRAFRICA 35.3 (A) 07/21/2022    ESTGFRAFRICA 32.8 (A) 06/22/2022    ESTGFRAFRICA 46.0 (A) 11/16/2020    ESTGFRAFRICA >60.0 04/30/2019    ESTGFRAFRICA 62 07/30/2018    ESTGFRAFRICA 71 06/21/2016     - Follow up with Cardiology for BP management, with fludrocortisone discontinued  - Daily BP measurements/recordings  - Continue with PRN Hydralazine  - Avoid nephrotoxic medications  - Trend GFR  - Manage UTI per assessment and plan  - Urine microalbumin/creatinine ratio to assess for proteinuria  - Consider ACEi/ARB for elevated UMAC in setting of CKD

## 2024-10-22 NOTE — ASSESSMENT & PLAN NOTE
Results for TTE orders placed in visit on 09/19/19  · Normal left ventricular systolic function. The estimated ejection fraction is 60%  · Grade II (moderate) left ventricular diastolic dysfunction consistent with pseudonormalization. Elevated left atrial pressure.  · No wall motion abnormalities.  · Concentric left ventricular remodeling.  · Normal right ventricular systolic function.  · The estimated PA systolic pressure is 27 mm Hg  · Normal central venous pressure (3 mm Hg).  · Small circumferential pericardial effusion. No tamponade physiology.  - Pt is not on medical management currently, while trending labile BP  - Monitor strict I&Os and daily weights.    - Daily BP measurements/logs  - Low sodium diet  - Place on fluid restriction of 1.5 L.   - Cardiology is following  - The patient's volume status is at her baseline

## 2024-10-22 NOTE — ASSESSMENT & PLAN NOTE
- Ordered UA with reflex  - PE negative for pain with palpation  - Follow up urine labs with low threshold for starting antibiotic therapy.

## 2024-10-22 NOTE — TELEPHONE ENCOUNTER
----- Message from Demarcus Julian MD sent at 10/22/2024  6:14 AM CDT -----  Lab results were reviewed and are concerning for a urinary tract infection. Will start on antibiotic therapy; medication sent to preferred pharmacy. Will continue following culture results.

## 2024-10-22 NOTE — ASSESSMENT & PLAN NOTE
BP Readings from Last 8 Encounters:   10/21/24 138/78   10/03/24 (!) 170/98   09/16/24 (!) 149/101   06/06/23 (!) 152/86   05/30/23 126/80   04/06/23 (!) 186/96   12/06/22 (!) 154/108   09/07/22 (!) 184/96     - Reviewed BP logs; significant for labile SBP and DBP but HR relatively stable wnl  - Orthostatic hypotension in clinic  - PMH negative for adrenal insufficiency  - Prior lab results with normal AM cortisol, renin and aldosterone levels  - Fludrocortisone discontinued  - Reviewed/updated med list  - Continue follow up with cardiology  - Strict return to clinic precautions discussed

## 2024-10-23 LAB
BACTERIA UR CULT: NORMAL
BACTERIA UR CULT: NORMAL

## 2024-10-29 ENCOUNTER — PATIENT MESSAGE (OUTPATIENT)
Dept: PRIMARY CARE CLINIC | Facility: CLINIC | Age: 79
End: 2024-10-29
Payer: MEDICARE

## 2024-10-29 RX ORDER — MULTIVITAMIN
1 TABLET ORAL DAILY
Qty: 90 TABLET | Refills: 3 | Status: SHIPPED | OUTPATIENT
Start: 2024-10-29 | End: 2025-10-24

## 2024-11-01 ENCOUNTER — TELEPHONE (OUTPATIENT)
Dept: CARDIOLOGY | Facility: CLINIC | Age: 79
End: 2024-11-01
Payer: MEDICARE

## 2024-11-01 ENCOUNTER — PATIENT MESSAGE (OUTPATIENT)
Dept: PRIMARY CARE CLINIC | Facility: CLINIC | Age: 79
End: 2024-11-01
Payer: MEDICARE

## 2024-11-01 DIAGNOSIS — R09.89 LABILE BLOOD PRESSURE: Primary | ICD-10-CM

## 2024-11-04 ENCOUNTER — PATIENT MESSAGE (OUTPATIENT)
Dept: CARDIOLOGY | Facility: CLINIC | Age: 79
End: 2024-11-04
Payer: MEDICARE

## 2024-11-04 DIAGNOSIS — R00.2 PALPITATIONS: Primary | ICD-10-CM

## 2024-11-04 NOTE — PROGRESS NOTES
"     General Cardiology Clinic Note  Reason for Visit: Syncope   Last Clinic Visit: 10/03/24   General Cardiologist: Dr. Watson     HPI:     Madeline Reilly is a 78 y.o. , who presents for follow up of syncope     PROBLEM LIST:  HFpEF   Hypertension   Hyperlipidemia   CKD   Parkinson's   Dementia     Interval HPI:   Madeline Reilly presents today following visit to the ED on 10/31 after syncopal episode. History is complicated to obtain but patient is assisted by her daughter who is providing information today. It appears the patient was standing outside when her syncopal episode occurred and CPR was initiated by her son and daughter-in-law. It is unclear whether or not patient was pulseless, but CPR was administered for 14 minutes. When EMS arrived at the scene, patient had regained consciousness and appeared to be back at baseline. In ED her blood pressure 235/121, EKG negative for acute ischemic changes or dysrhythmia, head CT, CXR and UA unremarkable. She was administered Hydralazine with normalization of BP and discharged home.    Today, patient reports feeling lightheaded and a slight headache. Her blood pressure today in clinic is 99/54. She checks her blood pressure regularly at home but does not have her BP log with her today. Patient did experience a syncopal episode last night when getting up to use the restroom around 3 am. She did not hit her head and the fall was not witnessed, but she suffered a laceration to her lip from the fall. She denies any prodromal symptoms prior to the episode, however, it is unclear whether or not she truly did not have any symptoms prior to the fall. It is hard to obtain a history from the patient. Patient states that she experiences symptoms of lightheadedness "sometimes" before a syncopal episode, but not always. She has weaned off the Florinef completely and is not taking this anymore. A phone discussion was made with patients son to assist with patient history. " Patient son states that her home BP readings have been within normal range with one hypotensive BP reading yesterday in the 90's systolic. Patient has required two doses of Hydralazine since her discharge for BP > 200 and it appears that hypotensive blood pressure readings are not the issue. Patient ambulates with her rollator-walker at home, with symptoms of lightheadedness and dizziness occurring more with positional changes. She becomes very lightheaded when walking down the clinic singh today, requiring a wheelchair. At her last clinic visit, abdominal compression was recommended for autonomic dysfunction but patient has not obtained this yet. She denies any denies chest pain/pressure/tightness/discomfort, dyspnea on exertion, orthopnea, PND, peripheral edema, palpitations or claudication.    Last visit with Dr. Watson:   Patient presenting as a referral from Internal Medicine for labile blood pressure. Patient reports that she has had high blood pressure for years. At some point in the last few years she started having frequent falls and orthostatic hypotension. She was started on midodrine at 1st and then was taken off of this and started on Florinef. Since being put on Florinef she has had hypertension. Review of logs today shows systolic blood pressures between 110 and 190. Only 1 low blood pressure with systolic below 80 recorded. Her son who accompanied her to the visit states that she is sometimes lightheaded when she gets up from a seated position. She lives at the home with her son and daughter-in-law.      ROS:    Review of Systems   Constitutional: Negative.    HENT: Negative.     Eyes: Negative.    Respiratory: Negative.     Cardiovascular: Negative.    Gastrointestinal: Negative.    Genitourinary: Negative.    Musculoskeletal:  Positive for falls.   Skin: Negative.    Neurological:  Positive for dizziness, loss of consciousness, weakness and headaches.   Endo/Heme/Allergies: Negative.     Psychiatric/Behavioral:  Positive for memory loss.      PMH:     Past Medical History:   Diagnosis Date    Arthritis     Asthma, chronic     Cataract     Hypertension     Orthostatic hypotension      Past Surgical History:   Procedure Laterality Date    CATARACT EXTRACTION      CATARACT EXTRACTION, BILATERAL      PARTIAL HYSTERECTOMY  1981     Allergies:     Review of patient's allergies indicates:   Allergen Reactions    Lisinopril Shortness Of Breath, Diarrhea and Nausea Only     All side affects from medication    Beef containing products     Hydrochlorothiazide     Pork/porcine containing products     Reserpine     Statins-hmg-coa reductase inhibitors Diarrhea and Nausea And Vomiting    Toprol xl [metoprolol succinate] Other (See Comments)     Low blood preasure    Aspirin Nausea And Vomiting    Hydralazine-reserpin-hcthiazid Diarrhea     Medications:     Current Outpatient Medications on File Prior to Visit   Medication Sig Dispense Refill    acetaminophen (TYLENOL) 650 MG TbSR Take 1 tablet (650 mg total) by mouth every 8 (eight) hours as needed (back pain). 60 tablet 1    bismuth subsalicylate (BISMUTH ORAL) Take 15 mLs by mouth every 6 (six) hours as needed.      carbidopa-levodopa  mg (SINEMET)  mg per tablet Take 1 tablet by mouth 3 (three) times daily. 90 tablet 0    diaper,brief,adult,disposable Misc Use as dictioned 96 each 5    diclofenac sodium (VOLTAREN) 1 % Gel Apply 2 g topically 4 (four) times daily. 200 g 2    diphenhydrAMINE (BENADRYL) 25 mg capsule Take 25 mg by mouth every 6 (six) hours as needed for Itching.      EScitalopram oxalate (LEXAPRO) 10 MG tablet Take 1 tablet (10 mg total) by mouth once daily. 30 tablet 0    multivitamin (THERAGRAN) per tablet Take 1 tablet by mouth once daily. 90 tablet 3    pantoprazole (PROTONIX) 40 MG tablet Take 1 tablet (40 mg total) by mouth once daily. 90 tablet 0    QUEtiapine (SEROQUEL) 25 MG Tab Take 1 tablet (25 mg total) by mouth every  "evening. Take this medication ~45 minutes prior to going to bed every night 90 tablet 0    rivastigmine (EXELON) 4.6 mg/24 hour PT24 Place 1 patch onto the skin once daily. 90 patch 0    [DISCONTINUED] hydrALAZINE (APRESOLINE) 10 MG tablet Take 10 mg by mouth 3 (three) times daily.       No current facility-administered medications on file prior to visit.     Social History:     Social History     Tobacco Use    Smoking status: Never     Passive exposure: Never    Smokeless tobacco: Never   Substance Use Topics    Alcohol use: No     Family History:     Family History   Problem Relation Name Age of Onset    Cancer Maternal Grandmother          "abdomen"    Cancer Maternal Aunt          "abdominal cancer"     Physical Exam:   BP (!) 99/54 (Patient Position: Sitting)   Pulse 70   Ht 5' 6" (1.676 m)   Wt 53.3 kg (117 lb 8.1 oz)   SpO2 98%   BMI 18.97 kg/m²      Physical Exam  Constitutional:       General: She is not in acute distress.     Appearance: Normal appearance. She is normal weight. She is not ill-appearing or toxic-appearing.   HENT:      Head: Normocephalic and atraumatic.      Nose: Nose normal. No congestion or rhinorrhea.      Mouth/Throat:      Mouth: Mucous membranes are moist.      Pharynx: Oropharynx is clear. No oropharyngeal exudate or posterior oropharyngeal erythema.   Eyes:      General:         Right eye: No discharge.         Left eye: No discharge.      Extraocular Movements: Extraocular movements intact.      Conjunctiva/sclera: Conjunctivae normal.   Neck:      Vascular: No carotid bruit.   Cardiovascular:      Rate and Rhythm: Normal rate and regular rhythm.      Pulses:           Carotid pulses are 2+ on the right side and 2+ on the left side.       Radial pulses are 2+ on the right side and 2+ on the left side.        Dorsalis pedis pulses are 2+ on the right side and 2+ on the left side.        Posterior tibial pulses are 2+ on the right side and 2+ on the left side.   Pulmonary:    "   Effort: Pulmonary effort is normal. No respiratory distress.      Breath sounds: Normal breath sounds. No stridor. No wheezing or rales.   Musculoskeletal:         General: No swelling. Normal range of motion.      Cervical back: Normal range of motion. No rigidity or tenderness.      Right lower leg: No edema.      Left lower leg: No edema.   Skin:     General: Skin is warm and dry.      Coloration: Skin is not jaundiced.      Findings: No bruising or lesion.   Neurological:      Motor: Weakness present.      Coordination: Coordination abnormal.      Gait: Gait abnormal.   Psychiatric:         Mood and Affect: Mood normal.         Behavior: Behavior normal.        Labs:     Blood Tests:  Lab Results   Component Value Date     04/14/2023     04/03/2023    K 4.4 04/14/2023    K 4.4 04/03/2023     04/03/2023    CO2 24 04/14/2023    CO2 24 04/03/2023    BUN 33.0 (H) 04/14/2023    BUN 34 (H) 04/03/2023    BUN 15 09/29/2015    CREATININE 1.30 (H) 04/14/2023    CREATININE 1.9 (H) 04/03/2023    GLU 95 04/03/2023    HGBA1C 5.5 08/13/2023    HGBA1C 5.5 04/30/2019    AST 17 09/06/2022    ALT <5 (L) 09/06/2022    ALBUMIN 4.2 09/06/2022    PROT 7.4 09/06/2022    BILITOT 0.7 09/06/2022    WBC 5.0 01/13/2024    WBC 6.20 04/03/2023    HGB 11.3 (L) 01/13/2024    HGB 11.6 (L) 04/03/2023    HCT 35.1 01/13/2024    HCT 36.8 (L) 04/03/2023    MCV 91.4 01/13/2024    MCV 96 04/03/2023     04/03/2023    INR 1.0 08/16/2024    TSH 1.02 01/11/2024    TSH 2.501 06/22/2022       Lab Results   Component Value Date    CHOL 236 (H) 06/22/2022    HDL 73 06/22/2022    TRIG 59 06/22/2022       Lab Results   Component Value Date    LDLCALC 151.2 06/22/2022       Lab Results   Component Value Date    TSH 1.02 01/11/2024       Lab Results   Component Value Date    HGBA1C 5.5 08/13/2023     Imaging:     Echocardiogram  TTE 09/19/19  Normal left ventricular systolic function. The estimated ejection fraction is 60%  Grade II  (moderate) left ventricular diastolic dysfunction consistent with pseudonormalization. Elevated left atrial pressure.  No wall motion abnormalities.  Concentric left ventricular remodeling.  Normal right ventricular systolic function.  The estimated PA systolic pressure is 27 mm Hg  Normal central venous pressure (3 mm Hg).  Small circumferential pericardial effusion. No tamponade physiology.    TTE 06/08/16  Findings:   Left ventricle is normal in size.   Left ventricle has normal systolic function with an ejection   fraction of 60%   Left ventricular hypertrophy is noted.   There are no obvious wall motion abnormalities.   The left atrium is normal in size.   Abnormal diastolic function is noted.   The mitral valve appears normal . There is mild mitral regurgitation   The aortic valve is normal . There is no   aortic regurgitation.   The right ventricle is normal in size and function.   There is trivial tricuspid regurgitation with an estimated pulmonary   pressure of 20 mmHg.   The pulmonic valve was visualized and appears to have normal   anatomy. Mild pulmonary insuffiencey noted.   A small pericardial effusion is present.     Stress testing  None    Cath Lab  None    Other  Bilateral Carotid US 10/01/13  Conclusion:     No stenosis was identified.     EKG:   10/03/24:  Normal sinus rhythm   Normal ECG   When compared with ECG of 01-JUN-2022 14:35,   No significant change was found     Assessment:     1. Syncope and collapse    2. Chronic heart failure with preserved ejection fraction    3. Autonomic dysfunction    4. Hypertension, unspecified type    5. Vasovagal syncope    6. Palpitations    7. Chronic diastolic congestive heart failure    8. Labile blood pressure      Plan:     Syncope and collapse  Autonomic dysfunction  Case was discussed with Dr. Watson given complexity of patient. Initially recommended starting patient back in Midodrine 5 mg TID given concern of hypotensive BP readings. After  discussing with patient son, it appears her home BP readings are well controlled with occasional spikes in her BP requiring Hydralazine   Recommend patient increase hydration and obtain abdominal compression device to assist with autonomic dysfunction   Will obtain 30 day event monitor to rule out underlying conduction abnormality disorders   Emphasized to patient the importance of ambulating with assistance and she is not to move or change from one position to another unless under supervision     Chronic heart failure with preserved ejection fraction  Patient euvolemic during examination   Will obtain TTE     Labile blood pressure  Hypertension, unspecified type  BP 99/54 today, does not have her home BP log with her today   Patient son states her home BP readings within normal range on average with occasional spikes in her BP   Continue Hydralazine 10 mg prn for systolic BP > 180       Follow up in one week     Signed:  Sharon Lagos, PA-C [facility]     11/5/2024 1:01 PM    Follow-up:     Future Appointments   Date Time Provider Department Center   11/5/2024  3:00 PM Jodee Fabian PA-C [facility]   11/7/2024  3:00 PM Demarcus Julian MD [facility] [place]   11/21/2024  1:00 PM Jodee Fabian PA-C [facility]   11/25/2024  4:00 PM Demarcus Julian MD [facility] 65[facility] [place]   12/2/2024  2:40 PM Malissa Izquierdo PA-C [facility] 65[facility] [place]   12/18/2024 11:00 AM MONITOR, ARRHYTHMIA EVENT [facility] TEVIN Mccormack

## 2024-11-04 NOTE — TELEPHONE ENCOUNTER
A phone call was made to Ms. Madeline Reilly on 11/01/24 to discuss recent ER admission for syncope. Family was very helpful in providing collateral info regarding the presentation. Episode likely associated with labile HTN. Head scans and EKG were negative at presentation. Reached out to Cardiology for urgent hospital follow up, now scheduled on 11/05/24. PCP follow up scheduled afterwards. We will see Ms. Madeline Reilly at next scheduled appointment.      Demarcus Julian MD  65[facility] [place] [facility]

## 2024-11-04 NOTE — TELEPHONE ENCOUNTER
CONFIRMED. SPOKE WITH MS MATA ON EARLIEST PT CAN COME IN, SHE VERBALIZED 11. I then had informed Mr. Vazquez that the appt time will still say 3pm but he's aware that his mom can come in for 11am. Pt verbalized understanding.         Spoke with Mr. Vazquez in regards to his mom, notified him that I will reach back out to him as soon as I hear back from Ms. Mata in regards to his mom coming in earlier for her appt tm.

## 2024-11-05 ENCOUNTER — OFFICE VISIT (OUTPATIENT)
Dept: CARDIOLOGY | Facility: CLINIC | Age: 79
End: 2024-11-05
Payer: MEDICARE

## 2024-11-05 ENCOUNTER — EXTERNAL HOME HEALTH (OUTPATIENT)
Dept: HOME HEALTH SERVICES | Facility: HOSPITAL | Age: 79
End: 2024-11-05
Payer: MEDICARE

## 2024-11-05 VITALS
SYSTOLIC BLOOD PRESSURE: 99 MMHG | BODY MASS INDEX: 18.88 KG/M2 | DIASTOLIC BLOOD PRESSURE: 54 MMHG | HEART RATE: 70 BPM | HEIGHT: 66 IN | OXYGEN SATURATION: 98 % | WEIGHT: 117.5 LBS

## 2024-11-05 DIAGNOSIS — I50.32 CHRONIC HEART FAILURE WITH PRESERVED EJECTION FRACTION: ICD-10-CM

## 2024-11-05 DIAGNOSIS — I50.32 CHRONIC DIASTOLIC CONGESTIVE HEART FAILURE: ICD-10-CM

## 2024-11-05 DIAGNOSIS — R00.2 PALPITATIONS: ICD-10-CM

## 2024-11-05 DIAGNOSIS — R55 SYNCOPE AND COLLAPSE: Primary | ICD-10-CM

## 2024-11-05 DIAGNOSIS — R55 VASOVAGAL SYNCOPE: ICD-10-CM

## 2024-11-05 DIAGNOSIS — I10 HYPERTENSION, UNSPECIFIED TYPE: ICD-10-CM

## 2024-11-05 DIAGNOSIS — G90.9 AUTONOMIC DYSFUNCTION: ICD-10-CM

## 2024-11-05 DIAGNOSIS — R09.89 LABILE BLOOD PRESSURE: ICD-10-CM

## 2024-11-05 PROCEDURE — 99214 OFFICE O/P EST MOD 30 MIN: CPT | Mod: S$GLB,,,

## 2024-11-05 PROCEDURE — 99999 PR PBB SHADOW E&M-EST. PATIENT-LVL IV: CPT | Mod: PBBFAC,,,

## 2024-11-05 PROCEDURE — 1159F MED LIST DOCD IN RCRD: CPT | Mod: CPTII,S$GLB,,

## 2024-11-05 PROCEDURE — 3078F DIAST BP <80 MM HG: CPT | Mod: CPTII,S$GLB,,

## 2024-11-05 PROCEDURE — 3074F SYST BP LT 130 MM HG: CPT | Mod: CPTII,S$GLB,,

## 2024-11-05 PROCEDURE — 3288F FALL RISK ASSESSMENT DOCD: CPT | Mod: CPTII,S$GLB,,

## 2024-11-05 PROCEDURE — 1157F ADVNC CARE PLAN IN RCRD: CPT | Mod: CPTII,S$GLB,,

## 2024-11-05 PROCEDURE — 1100F PTFALLS ASSESS-DOCD GE2>/YR: CPT | Mod: CPTII,S$GLB,,

## 2024-11-05 RX ORDER — HYDRALAZINE HYDROCHLORIDE 10 MG/1
10 TABLET, FILM COATED ORAL
Qty: 30 TABLET | Refills: 11 | Status: SHIPPED | OUTPATIENT
Start: 2024-11-05

## 2024-11-06 ENCOUNTER — TELEPHONE (OUTPATIENT)
Dept: PRIMARY CARE CLINIC | Facility: CLINIC | Age: 79
End: 2024-11-06
Payer: MEDICARE

## 2024-11-06 ENCOUNTER — PATIENT MESSAGE (OUTPATIENT)
Dept: PRIMARY CARE CLINIC | Facility: CLINIC | Age: 79
End: 2024-11-06
Payer: MEDICARE

## 2024-11-06 NOTE — TELEPHONE ENCOUNTER
Pt's follow-up for the end of November was canceled due to son's work schedule. HFU was already scheduled.

## 2024-11-07 ENCOUNTER — TELEPHONE (OUTPATIENT)
Dept: PRIMARY CARE CLINIC | Facility: CLINIC | Age: 79
End: 2024-11-07
Payer: MEDICARE

## 2024-11-07 ENCOUNTER — OFFICE VISIT (OUTPATIENT)
Dept: PRIMARY CARE CLINIC | Facility: CLINIC | Age: 79
End: 2024-11-07
Payer: MEDICARE

## 2024-11-07 VITALS
DIASTOLIC BLOOD PRESSURE: 110 MMHG | BODY MASS INDEX: 19.3 KG/M2 | OXYGEN SATURATION: 96 % | WEIGHT: 119.63 LBS | SYSTOLIC BLOOD PRESSURE: 218 MMHG | HEART RATE: 67 BPM
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
## 2024-11-07 DIAGNOSIS — R09.89 LABILE BLOOD PRESSURE: Primary | ICD-10-CM

## 2024-11-07 PROCEDURE — 99999 PR PBB SHADOW E&M-EST. PATIENT-LVL III: CPT | Mod: PBBFAC,,,

## 2024-11-07 NOTE — PROGRESS NOTES
[facility]    Subjective     Patient Name: Madeline Reilly  YOB: 1945  Patient Age: 78 y.o.  Patient Sex: female  Patient Phone: [phone]  PCP: Sara Ku MD  Last PCP Appointment: 9/16/2024    History of Present Illness    CHIEF COMPLAINT:  Ms. Reilly presents today with her son Heladio for follow-up of blood pressure concerns. She is using a rolator and has a steady gate.    BLOOD PRESSURE MANAGEMENT:  She reports experiencing blood pressure fluctuations, characterized by normal pressures alternating with extremely high pressures. At home, her readings would be within normal range, but then suddenly spike to over 200 when standing up. These pressure shifts are associated with worsening symptoms. During the current visit, her blood pressure readings were elevated. However, she mentions a recent cardiology appointment where her blood pressure reading was (SBP 98mmHg). She has been prescribed hydralazine 10mg to manage episodes of high blood pressure, taking it as needed when high pressures are detected at home. She does not typically recheck her blood pressure after administration. She denies any clear correlation between dietary factors and her blood pressure fluctuations. She has eliminated [employer] from her diet as a precautionary measure. 24hr care managed by her daughter in law who checks/logs her BP regularly.    CARDIAC EVALUATION:  She is scheduled for an echocardiogram on 11/15/24 and a Cardiology follow up on 11/21/24. Her previous echocardiogram in 2019 showed good systolic function with moderate grade II diastolic dysfunction. A Holter monitor installation is scheduled for 12/19/24 to evaluate her heart rhythm over an extended period.    NEUROLOGICAL SYMPTOMS:  She reports daily headaches, though she denies having one at the time of the appointment. She has been taking carbidopa-levodopa for approximately a year and a half for Parkinson's symptoms.    MOBILITY  AND FALLS:  She uses a rolator for mobility assistance and reports a recent fall in the bathroom 11/06/24 with no head trauma but did cut her lip.    SLEEP:  She reports improved sleep pattern since starting Seroquel nightly. She now falls asleep and wakes up without interruptions, which is a change from her previous pattern of waking up multiple times during the night.    GASTROINTESTINAL AND URINARY ISSUES:  She reports significant bowel and urinary symptoms, experiencing episodes of urine incontinence at least once a week, alternating with defecation incontenance the following week.    EDEMA:  She reports experiencing some leg swelling without associated pain.    MEDICATIONS:  She confirms adherence to a complex medication regimen, including multiple daily doses: seven pills in the morning, two at midday, and two at night. This includes Seroquel nightly and carbidopa-levodopa.      ROS:  ROS is negative unless otherwise indicated in HPI.         Health Maintenance and Care Gaps  TDap is UTD.   Influenza is UTD.  Pneumovax is not UTD.   Zostavax is not UTD.       Covid is not UTD.  RSV is not UTD.  Colonoscopy is UTD.   DEXA  is not UTD.    Screening:  Hepatitis C is UTD in pts born between 1296-2357.   HIV is UTD     Prior to Admission medications    Medication Sig Start Date End Date Taking? Authorizing Provider   acetaminophen (TYLENOL) 650 MG TbSR Take 1 tablet (650 mg total) by mouth every 8 (eight) hours as needed (back pain). 5/30/23  Yes Rita Cowan MD   bismuth subsalicylate (BISMUTH ORAL) Take 15 mLs by mouth every 6 (six) hours as needed.   Yes Provider, Historical   carbidopa-levodopa  mg (SINEMET)  mg per tablet Take 1 tablet by mouth 3 (three) times daily. 7/19/23  Yes Aidee Barbosa MD   diaper,brief,adult,disposable Misc Use as dictioned 7/29/22  Yes Aidee Barbosa MD   diclofenac sodium (VOLTAREN) 1 % Gel Apply 2 g topically 4 (four) times daily. 5/30/23  Yes  Rita Cowan MD   diphenhydrAMINE (BENADRYL) 25 mg capsule Take 25 mg by mouth every 6 (six) hours as needed for Itching.   Yes Provider, Historical   EScitalopram oxalate (LEXAPRO) 10 MG tablet Take 1 tablet (10 mg total) by mouth once daily. 6/28/23  Yes Aidee Barbosa MD   hydrALAZINE (APRESOLINE) 10 MG tablet Take 1 tablet (10 mg total) by mouth as needed (for systolic BP > 180). 11/5/24  Yes Jodee Fabian PA-C   multivitamin (THERAGRAN) per tablet Take 1 tablet by mouth once daily. 10/29/24 10/24/25 Yes Demarcus Julian MD   pantoprazole (PROTONIX) 40 MG tablet Take 1 tablet (40 mg total) by mouth once daily. 9/19/24 12/18/24 Yes Sara Ku MD   QUEtiapine (SEROQUEL) 25 MG Tab Take 1 tablet (25 mg total) by mouth every evening. Take this medication ~45 minutes prior to going to bed every night 9/19/24  Yes Sara Ku MD   rivastigmine (EXELON) 4.6 mg/24 hour PT24 Place 1 patch onto the skin once daily. 9/26/24 12/25/24 Yes Sara Ku MD       OBJECTIVE:     Vitals:    11/07/24 1456   BP: (!) 218/110   BP Location: Left arm   Patient Position: Sitting   Pulse: 67   SpO2: 96%   Weight: 54.2 kg (119 lb 9.6 oz)       Body mass index is 19.3 kg/m².     PHYSICAL EXAM  GEN - A+OX4, NAD   HEENT - PERRL, EOMI, OP clear  Neck - No thyromegaly or cervical LAD. No thyroid masses felt.  CV - RRR, hypertensive, no m/r   Chest - CTAB, no wheezing or rhonchi  Abd - S/NT/ND/+BS.   Ext - 2+BDP and radial pulses. No C/C/E.  MSK - normal ROM, no tenderness  Neuro - 5/5 BUE and BLE strength.  LN - No axillary or inguinal LAD appreciated.  Skin - No rash. Cut on upper lip    LABS  Lab Results   Component Value Date    WBC 5.0 01/13/2024    HGB 11.3 (L) 01/13/2024    HCT 35.1 01/13/2024    MCV 91.4 01/13/2024     04/03/2023         CMP  Sodium   Date Value Ref Range Status   04/14/2023 142 136 - 145 mmol/L Final   04/03/2023 140 136 - 145 mmol/L Final     Potassium   Date Value Ref Range Status    04/14/2023 4.4 3.4 - 5.2 mmol/L Final   04/03/2023 4.4 3.5 - 5.1 mmol/L Final     Chloride   Date Value Ref Range Status   04/03/2023 105 95 - 110 mmol/L Final     CO2   Date Value Ref Range Status   04/03/2023 24 23 - 29 mmol/L Final     Carbon Dioxide   Date Value Ref Range Status   04/14/2023 24 22 - 29 mmol/L Final     Glucose   Date Value Ref Range Status   04/03/2023 95 70 - 110 mg/dL Final     BUN   Date Value Ref Range Status   04/14/2023 33.0 (H) 7.0 - 18.7 mg/dL Final   04/03/2023 34 (H) 8 - 23 mg/dL Final   09/29/2015 15 7 - 21 mg/dL Final     Creatinine   Date Value Ref Range Status   04/14/2023 1.30 (H) 0.57 - 1.11 mg/dL Final   04/03/2023 1.9 (H) 0.5 - 1.4 mg/dL Final     Calcium   Date Value Ref Range Status   04/14/2023 9.3 8.4 - 10.2 mg/dL Final   04/03/2023 9.8 8.7 - 10.5 mg/dL Final     Total Protein   Date Value Ref Range Status   09/06/2022 7.4 6.0 - 8.4 g/dL Final     Albumin   Date Value Ref Range Status   09/06/2022 4.2 3.5 - 5.2 g/dL Final     Total Bilirubin   Date Value Ref Range Status   09/06/2022 0.7 0.1 - 1.0 mg/dL Final     Comment:     For infants and newborns, interpretation of results should be based  on gestational age, weight and in agreement with clinical  observations.    Premature Infant recommended reference ranges:  Up to 24 hours.............<8.0 mg/dL  Up to 48 hours............<12.0 mg/dL  3-5 days..................<15.0 mg/dL  6-29 days.................<15.0 mg/dL       Alkaline Phosphatase   Date Value Ref Range Status   09/06/2022 40 (L) 55 - 135 U/L Final     AST   Date Value Ref Range Status   09/06/2022 17 10 - 40 U/L Final     ALT   Date Value Ref Range Status   09/06/2022 <5 (L) 10 - 44 U/L Final     Anion Gap   Date Value Ref Range Status   04/14/2023 12 mmol/L Final   04/03/2023 11 8 - 16 mmol/L Final   09/29/2015 18 9 - 18 mEq/L Final     eGFR   Date Value Ref Range Status   04/03/2023 26.5 (A) >60 mL/min/1.73 m^2 Final       ASSESSMENT & PLAN:   Ms. Correa  Tommie is a 78 y.o. female who was seen in clinic today for scheduled follow up. Evaluating recurrent hypertension episodes with normal baseline pressures.  Considering autonomic dysfunction as potential cause based on inappropriate conduction circuit response. Reviewed prior normal renin/aldosterone and metanephrine levels. Pt still experiencing transient SBP elevations >200mmHg for which she takes hydralazine 10mg. She is oftern asymptomatic during these events.   Awaiting results of pending echocardiogram and Holter monitor to assess cardiac structure and rhythm. Will schedule follow up with Dr Ku (PCP) in one month.        1. Labile blood pressure  Overview:  - PMH of labile BP  - Followed by Cardiology  - Discontinued fludrocortisone and anti-hypertensive medications  - PRN hydralazine for SBP >180mmHg    Assessment & Plan:  BP Readings from Last 5 Encounters:   11/07/24 (!) 218/110   11/05/24 (!) 99/54   10/21/24 138/78   10/03/24 (!) 170/98   09/16/24 (!) 149/101     - Pt is asymptomatic with SBP above 200mmHg.   - Recommended hydralazine as soon as possible following clinic appointment  - Daily home BP checks/logs  - Explained cardiac conduction system and baroreceptor function in regulating blood pressure.  - Continued hydralyzine as needed for acute hypertensive episodes.  - Discussed purpose of echocardiogram in assessing heart structure and function.  - Explained rationale for Holter monitor in detecting arrhythmias and heart rate variability.  - Echocardiogram scheduled on 11/15/24.  - Neurology referral on 12/10/24  - Holter monitor to be placed on 12/19/24      2. Falls, sequela  Overview:  - History of falls in setting of labile BP likely associated with autonomic dysfunction  - Last fall on 11/06/24    Assessment & Plan:  - Encouraged taking transfers and ambulation slowly, including using the bathroom.  - Always use rolator assistance with ambulation  - Accompaniment when ambulating long  distances  - Keep hydralazine on person for management of acute symptomatic hypertensive episodes       Follow up in about 1 month (around 12/7/2024).     All questions answered. Pt to call/message the Primary Clinic for any additional concerns        Demarcus Julian MD  [facility] - [facility]    This note was generated with the assistance of ambient listening technology. Verbal consent was obtained by the patient and accompanying visitor(s) for the recording of patient appointment to facilitate this note. I attest to having reviewed and edited the generated note for accuracy, though some syntax or spelling errors may persist. Please contact the author of this note for any clarification.

## 2024-11-08 NOTE — ASSESSMENT & PLAN NOTE
- Encouraged taking transfers and ambulation slowly, including using the bathroom.  - Always use rolator assistance with ambulation  - Accompaniment when ambulating long distances  - Keep hydralazine on person for management of acute symptomatic hypertensive episodes

## 2024-11-08 NOTE — ASSESSMENT & PLAN NOTE
BP Readings from Last 5 Encounters:   11/07/24 (!) 218/110   11/05/24 (!) 99/54   10/21/24 138/78   10/03/24 (!) 170/98   09/16/24 (!) 149/101     - Pt is asymptomatic with SBP above 200mmHg.   - Recommended hydralazine as soon as possible following clinic appointment  - Daily home BP checks/logs  - Explained cardiac conduction system and baroreceptor function in regulating blood pressure.  - Continued hydralyzine as needed for acute hypertensive episodes.  - Discussed purpose of echocardiogram in assessing heart structure and function.  - Explained rationale for Holter monitor in detecting arrhythmias and heart rate variability.  - Echocardiogram scheduled on 11/15/24.  - Neurology referral on 12/10/24  - Holter monitor to be placed on 12/19/24

## 2024-11-15 ENCOUNTER — HOSPITAL ENCOUNTER (OUTPATIENT)
Dept: CARDIOLOGY | Facility: OTHER | Age: 79
Discharge: HOME | End: 2024-11-15
Payer: MEDICARE

## 2024-11-15 VITALS
HEART RATE: 67 BPM | HEIGHT: 66 IN | BODY MASS INDEX: 19.13 KG/M2 | SYSTOLIC BLOOD PRESSURE: 218 MMHG | WEIGHT: 119 LBS | DIASTOLIC BLOOD PRESSURE: 110 MMHG

## 2024-11-15 DIAGNOSIS — I50.32 CHRONIC HEART FAILURE WITH PRESERVED EJECTION FRACTION: ICD-10-CM

## 2024-11-15 LAB
ASCENDING AORTA: 3.04 CM
AV INDEX (PROSTH): 0.86
AV MEAN GRADIENT: 3.1 MMHG
AV PEAK GRADIENT: 5.8 MMHG
AV VALVE AREA BY VELOCITY RATIO: 2.6 CM²
AV VALVE AREA: 2.7 CM²
AV VELOCITY RATIO: 0.83
BSA FOR ECHO PROCEDURE: 1.59 M2
CV ECHO LV RWT: 0.78 CM
DOP CALC AO PEAK VEL: 1.2 M/S
DOP CALC AO VTI: 27.4 CM
DOP CALC LVOT AREA: 3.1 CM2
DOP CALC LVOT DIAMETER: 2 CM
DOP CALC LVOT PEAK VEL: 1 M/S
DOP CALC LVOT STROKE VOLUME: 74.4 CM3
DOP CALC RVOT PEAK VEL: 0.69 M/S
DOP CALCLVOT PEAK VEL VTI: 23.7 CM
E WAVE DECELERATION TIME: 181.29 MSEC
E/A RATIO: 0.68
E/E' RATIO: 15.11 M/S
ECHO LV POSTERIOR WALL: 1.4 CM (ref 0.6–1.1)
FRACTIONAL SHORTENING: 27.8 % (ref 28–44)
GLOBAL LONGITUIDAL STRAIN: 13.7 %
INTERVENTRICULAR SEPTUM: 1.4 CM (ref 0.6–1.1)
IVC DIAMETER: 1.67 CM
LA MAJOR: 5.09 CM
LA MINOR: 4.97 CM
LA WIDTH: 3.3 CM
LEFT ATRIUM AREA SYSTOLIC (APICAL 2 CHAMBER): 19.45 CM2
LEFT ATRIUM AREA SYSTOLIC (APICAL 4 CHAMBER): 16.33 CM2
LEFT ATRIUM SIZE: 3.43 CM
LEFT ATRIUM VOLUME INDEX MOD: 30.8 ML/M2
LEFT ATRIUM VOLUME INDEX: 30.2 ML/M2
LEFT ATRIUM VOLUME MOD: 49.26 ML
LEFT ATRIUM VOLUME: 48.39 CM3
LEFT INTERNAL DIMENSION IN SYSTOLE: 2.6 CM (ref 2.1–4)
LEFT VENTRICLE DIASTOLIC VOLUME INDEX: 33.68 ML/M2
LEFT VENTRICLE DIASTOLIC VOLUME: 53.89 ML
LEFT VENTRICLE END SYSTOLIC VOLUME APICAL 2 CHAMBER: 58.18 ML
LEFT VENTRICLE END SYSTOLIC VOLUME APICAL 4 CHAMBER: 41.36 ML
LEFT VENTRICLE MASS INDEX: 112.4 G/M2
LEFT VENTRICLE SYSTOLIC VOLUME INDEX: 15.5 ML/M2
LEFT VENTRICLE SYSTOLIC VOLUME: 24.79 ML
LEFT VENTRICULAR INTERNAL DIMENSION IN DIASTOLE: 3.6 CM (ref 3.5–6)
LEFT VENTRICULAR MASS: 179.9 G
LV LATERAL E/E' RATIO: 13.6 M/S
LV SEPTAL E/E' RATIO: 17 M/S
LVED V (TEICH): 53.89 ML
LVES V (TEICH): 24.79 ML
LVOT MG: 2.06 MMHG
LVOT MV: 0.68 CM/S
MV PEAK A VEL: 1 M/S
MV PEAK E VEL: 0.68 M/S
MV STENOSIS PRESSURE HALF TIME: 52.57 MS
MV VALVE AREA P 1/2 METHOD: 4.18 CM2
OHS CV RV/LV RATIO: 0.83 CM
PISA MRMAX VEL: 5.89 M/S
PISA TR MAX VEL: 2.67 M/S
PULM VEIN S/D RATIO: 1.63
PV PEAK D VEL: 0.32 M/S
PV PEAK GRADIENT: 2 MMHG
PV PEAK S VEL: 0.52 M/S
PV PEAK VELOCITY: 0.77 M/S
RA MAJOR: 4.34 CM
RA PRESSURE ESTIMATED: 3 MMHG
RA WIDTH: 3 CM
RIGHT VENTRICLE DIASTOLIC BASEL DIMENSION: 3 CM
RV TB RVSP: 6 MMHG
RV TISSUE DOPPLER FREE WALL SYSTOLIC VELOCITY 1 (APICAL 4 CHAMBER VIEW): 11.38 CM/S
SINUS: 3.3 CM
STJ: 2.68 CM
TDI LATERAL: 0.05 M/S
TDI SEPTAL: 0.04 M/S
TDI: 0.05 M/S
TR MAX PG: 29 MMHG
TRICUSPID ANNULAR PLANE SYSTOLIC EXCURSION: 2.2 CM
TV REST PULMONARY ARTERY PRESSURE: 32 MMHG
Z-SCORE OF LEFT VENTRICULAR DIMENSION IN END DIASTOLE: -2.35
Z-SCORE OF LEFT VENTRICULAR DIMENSION IN END SYSTOLE: -0.65

## 2024-11-15 PROCEDURE — 93356 MYOCRD STRAIN IMG SPCKL TRCK: CPT | Mod: ,,, | Performed by: INTERNAL MEDICINE

## 2024-11-15 PROCEDURE — 93306 TTE W/DOPPLER COMPLETE: CPT

## 2024-11-15 PROCEDURE — 93306 TTE W/DOPPLER COMPLETE: CPT | Mod: 26,,, | Performed by: INTERNAL MEDICINE

## 2024-11-18 ENCOUNTER — PATIENT MESSAGE (OUTPATIENT)
Dept: CARDIOLOGY | Facility: CLINIC | Age: 79
End: 2024-11-18
Payer: MEDICARE

## 2024-11-18 ENCOUNTER — PATIENT MESSAGE (OUTPATIENT)
Dept: PRIMARY CARE CLINIC | Facility: CLINIC | Age: 79
End: 2024-11-18
Payer: MEDICARE

## 2024-11-18 NOTE — TELEPHONE ENCOUNTER
Pt's son stated the pt is doing much better this morning. He did not recall the exact pressure but her systolic was 160. He stated that the pt was unconscious for about 10 mins, but he and his wife decided not to go to the ED. Advised the importance of following up with the cardiologist (this week) and to inform clinic of any other updates.

## 2024-11-20 ENCOUNTER — TELEPHONE (OUTPATIENT)
Dept: CARDIOLOGY | Facility: CLINIC | Age: 79
End: 2024-11-20
Payer: MEDICARE

## 2024-11-21 NOTE — TELEPHONE ENCOUNTER
"Reached out to patient's son, Mr. Vazquez, in regards to message. Patient had another syncopal episode yesterday evening while seated at the dinner table. Her BP following this episode was 162/95. She was scheduled to see me this afternoon for a one week follow up. This appointment was cancelled due to patient being "out of it" today. He states that his mother is sitting on the sofa, has not moved or eaten, just sitting in the same spot. Her son states that the patient is responsive to stimuli but appears out of it. Requested to know if there is any underlying symptoms of fever/chills that may be contributing to this, possible underlying UTI? Patient son states he will reach out to his wife to get some more information and see if patient has any signs of fever. Discussed with patient son that ED might be the safest and best recommendation at this time, he will continue reaching out to his wife for more updates in regard to patients condition and let me know what they decide to do. It appears that these syncopal episodes have become more frequent since weaning the patient off Florinef. Discussed with son that palliative Florinef could be a possible option, understanding that the biggest risk would be that of a stroke. He would like to refrain from this which I am agreeable to. Will reach out to the patient son again this evening for update. She is scheduled to see Neurologist on 12/10 at [facility].   "

## 2024-11-22 ENCOUNTER — TELEPHONE (OUTPATIENT)
Dept: PRIMARY CARE CLINIC | Facility: CLINIC | Age: 79
End: 2024-11-22
Payer: MEDICARE

## 2024-11-22 NOTE — TELEPHONE ENCOUNTER
----- Message from Ty sent at 2024  2:10 PM CST -----  Contact: [phone] .[email or web address]     Patient is calling for Medical Advice regardin/90 BP asymptomatic, pt had a fall today with 95/54 BP, denying injuries from fall    Patient wants a call back or thru [facility]: call    Comments:    Please advise patient replies from provider may take up to 48 hours.

## 2024-11-27 ENCOUNTER — PATIENT MESSAGE (OUTPATIENT)
Dept: PRIMARY CARE CLINIC | Facility: CLINIC | Age: 79
End: 2024-11-27
Payer: MEDICARE

## 2024-11-27 ENCOUNTER — OFFICE VISIT (OUTPATIENT)
Dept: PRIMARY CARE CLINIC | Facility: CLINIC | Age: 79
End: 2024-11-27
Payer: MEDICARE

## 2024-11-27 VITALS
OXYGEN SATURATION: 95 % | SYSTOLIC BLOOD PRESSURE: 175 MMHG | BODY MASS INDEX: 18.8 KG/M2 | WEIGHT: 116.38 LBS | DIASTOLIC BLOOD PRESSURE: 105 MMHG | HEART RATE: 74 BPM

## 2024-11-27 DIAGNOSIS — R09.89 LABILE BLOOD PRESSURE: ICD-10-CM

## 2024-11-27 DIAGNOSIS — R30.9 URINARY PAIN: Primary | ICD-10-CM

## 2024-11-27 PROCEDURE — 1126F AMNT PAIN NOTED NONE PRSNT: CPT | Mod: CPTII,S$GLB,, | Performed by: INTERNAL MEDICINE

## 2024-11-27 PROCEDURE — 1160F RVW MEDS BY RX/DR IN RCRD: CPT | Mod: CPTII,S$GLB,, | Performed by: INTERNAL MEDICINE

## 2024-11-27 PROCEDURE — 1100F PTFALLS ASSESS-DOCD GE2>/YR: CPT | Mod: CPTII,S$GLB,, | Performed by: INTERNAL MEDICINE

## 2024-11-27 PROCEDURE — 99999 PR PBB SHADOW E&M-EST. PATIENT-LVL III: CPT | Mod: PBBFAC,,, | Performed by: INTERNAL MEDICINE

## 2024-11-27 PROCEDURE — 3080F DIAST BP >= 90 MM HG: CPT | Mod: CPTII,S$GLB,, | Performed by: INTERNAL MEDICINE

## 2024-11-27 PROCEDURE — 99215 OFFICE O/P EST HI 40 MIN: CPT | Mod: S$GLB,,, | Performed by: INTERNAL MEDICINE

## 2024-11-27 PROCEDURE — 3077F SYST BP >= 140 MM HG: CPT | Mod: CPTII,S$GLB,, | Performed by: INTERNAL MEDICINE

## 2024-11-27 PROCEDURE — 1157F ADVNC CARE PLAN IN RCRD: CPT | Mod: CPTII,S$GLB,, | Performed by: INTERNAL MEDICINE

## 2024-11-27 PROCEDURE — 1159F MED LIST DOCD IN RCRD: CPT | Mod: CPTII,S$GLB,, | Performed by: INTERNAL MEDICINE

## 2024-11-27 PROCEDURE — 3288F FALL RISK ASSESSMENT DOCD: CPT | Mod: CPTII,S$GLB,, | Performed by: INTERNAL MEDICINE

## 2024-11-27 NOTE — PROGRESS NOTES
Clinic Note  11/27/2024      Subjective:       Patient ID:  Ms. Correa is a 78 y.o. female being seen for an established visit.    Chief Complaint:  labile blood pressure    Ms. Correa is 78-year-old with labile blood pressure is here for high blood pressure of 227/125  She has repeated dizziness/syncopal episodes and is evaluated by Cardiology.  They are considering palliative Florinef for further episodes.  The family has not made a decision about it yet.    She was recently treated for UTI a month ago.    Her current blood pressure in the clinic was 175/105.    Patient denies any chest pain or pressure, headache, shortness of breath or tingling in her neck and arms  Her son is with her at the appointment.  Reviewed her blood pressure log for the last 2 weeks.  Most of the blood pressures ranging from 92 to 200 systolic.  She only takes hydralazine when her blood pressure shoots over 180.  She took 1 dose of hydralazine earlier today at around 10:00 a.m.        Review of Systems   Constitutional:  Negative for chills and fever.   Eyes:  Negative for blurred vision, double vision and redness.   Respiratory:  Negative for cough, hemoptysis and sputum production.    Cardiovascular:  Negative for chest pain, orthopnea and leg swelling.   Genitourinary:  Negative for dysuria and urgency.       Medication List with Changes/Refills   Current Medications    ACETAMINOPHEN (TYLENOL) 650 MG TBSR    Take 1 tablet (650 mg total) by mouth every 8 (eight) hours as needed (back pain).    BISMUTH SUBSALICYLATE (BISMUTH ORAL)    Take 15 mLs by mouth every 6 (six) hours as needed.    CARBIDOPA-LEVODOPA  MG (SINEMET)  MG PER TABLET    Take 1 tablet by mouth 3 (three) times daily.    DIAPER,BRIEF,ADULT,DISPOSABLE MISC    Use as dictioned    DICLOFENAC SODIUM (VOLTAREN) 1 % GEL    Apply 2 g topically 4 (four) times daily.    DIPHENHYDRAMINE (BENADRYL) 25 MG CAPSULE    Take 25 mg by mouth every 6 (six) hours as needed for  "Itching.    ESCITALOPRAM OXALATE (LEXAPRO) 10 MG TABLET    Take 1 tablet (10 mg total) by mouth once daily.    HYDRALAZINE (APRESOLINE) 10 MG TABLET    Take 1 tablet (10 mg total) by mouth as needed (for systolic BP > 180).    MULTIVITAMIN (THERAGRAN) PER TABLET    Take 1 tablet by mouth once daily.    PANTOPRAZOLE (PROTONIX) 40 MG TABLET    Take 1 tablet (40 mg total) by mouth once daily.    QUETIAPINE (SEROQUEL) 25 MG TAB    Take 1 tablet (25 mg total) by mouth every evening. Take this medication ~45 minutes prior to going to bed every night    RIVASTIGMINE (EXELON) 4.6 MG/24 HOUR PT24    Place 1 patch onto the skin once daily.           Objective:      BP (!) 175/105 (BP Location: Left arm, Patient Position: Sitting)   Pulse 74   Wt 52.8 kg (116 lb 6.5 oz)   SpO2 95%   BMI 18.80 kg/m²   Estimated body mass index is 18.8 kg/m² as calculated from the following:    Height as of 11/15/24: 5' 5.98" (1.676 m).    Weight as of this encounter: 52.8 kg (116 lb 6.5 oz).  Physical Exam  Constitutional:       Appearance: Normal appearance. She is normal weight.   HENT:      Head: Normocephalic and atraumatic.      Nose: Nose normal.      Mouth/Throat:      Mouth: Mucous membranes are moist.   Eyes:      Pupils: Pupils are equal, round, and reactive to light.   Cardiovascular:      Rate and Rhythm: Normal rate and regular rhythm.      Pulses: Normal pulses.      Heart sounds: Normal heart sounds. No murmur heard.  Pulmonary:      Effort: Pulmonary effort is normal.      Breath sounds: Normal breath sounds.   Abdominal:      General: Bowel sounds are normal.      Palpations: Abdomen is soft.   Skin:     General: Skin is warm.   Neurological:      General: No focal deficit present.      Mental Status: She is alert.      Cranial Nerves: No cranial nerve deficit.         Assessment and Plan:         Problem List Items Addressed This Visit          Cardiac/Vascular    Labile blood pressure    Overview     - PMH of labile BP  - " Followed by Cardiology  - Discontinued fludrocortisone and anti-hypertensive medications  - PRN hydralazine for SBP >180mmHg         Current Assessment & Plan     - Pt is asymptomatic with SBP above 200mmHg.   - Recommended hydralazine up to 3 times daily for hypertensive episodes   - Daily home BP checks/logs  -follow up with Cardiology and Neurology as needed              Renal/    Urinary pain - Primary    Overview     - 1-2 weeks of suprapubic pain reported with urination  - Pain radiates to right flank  - No history of renal stones  - Remote history of pan-sensitive Ecoli bacturia         Relevant Orders    POCT Urinalysis(Instrument)       Follow Up:   No follow-ups on file.    Other Orders Placed This Visit:  Orders Placed This Encounter   Procedures    POCT Urinalysis(Instrument)         Sara Ku

## 2024-11-27 NOTE — TELEPHONE ENCOUNTER
Erika Jarquin Staff  Caller: [phone] (Today, 12:30 PM)  .1MEDICALADVICE    Patient is calling for Medical Advice regarding:pt gage Vazquez [phone] calling to give reads from the bp as requested. 150/95 left arm 11/27/12:25pm    How long has patient had these symptoms:    Pharmacy name and phone#:    Patient wants a call back or thru [facility]:callback    Comments:    Please advise patient replies from provider may take up to 48 hours.

## 2024-11-27 NOTE — ASSESSMENT & PLAN NOTE
- Pt is asymptomatic with SBP above 200mmHg.   - Recommended hydralazine up to 3 times daily for hypertensive episodes   - Daily home BP checks/logs  -follow up with Cardiology and Neurology as needed

## 2024-11-27 NOTE — TELEPHONE ENCOUNTER
Spoke to Pt's son and scheduled an appt for today to address high blood pressure at 2:20pm with Dr Ku.

## 2024-12-02 ENCOUNTER — OFFICE VISIT (OUTPATIENT)
Dept: PRIMARY CARE CLINIC | Facility: CLINIC | Age: 79
End: 2024-12-02
Payer: MEDICARE

## 2024-12-02 VITALS
SYSTOLIC BLOOD PRESSURE: 175 MMHG | DIASTOLIC BLOOD PRESSURE: 92 MMHG | HEART RATE: 67 BPM | OXYGEN SATURATION: 98 % | WEIGHT: 117.38 LBS | BODY MASS INDEX: 18.96 KG/M2

## 2024-12-02 DIAGNOSIS — R09.89 LABILE BLOOD PRESSURE: ICD-10-CM

## 2024-12-02 DIAGNOSIS — Z99.89 DEPENDENCE ON OTHER ENABLING MACHINES AND DEVICES: ICD-10-CM

## 2024-12-02 DIAGNOSIS — Z00.00 ENCOUNTER FOR PREVENTIVE HEALTH EXAMINATION: Primary | ICD-10-CM

## 2024-12-02 DIAGNOSIS — Z00.00 ENCOUNTER FOR MEDICARE ANNUAL WELLNESS EXAM: ICD-10-CM

## 2024-12-02 DIAGNOSIS — Z74.09 OTHER REDUCED MOBILITY: ICD-10-CM

## 2024-12-02 DIAGNOSIS — I50.32 CHRONIC DIASTOLIC CONGESTIVE HEART FAILURE: ICD-10-CM

## 2024-12-02 DIAGNOSIS — N18.32 CHRONIC KIDNEY DISEASE (CKD) STAGE G3B/A2, MODERATELY DECREASED GLOMERULAR FILTRATION RATE (GFR) BETWEEN 30-44 ML/MIN/1.73 SQUARE METER AND ALBUMINURIA CREATININE RATIO BETWEEN 30-299 MG/G: ICD-10-CM

## 2024-12-02 DIAGNOSIS — R41.3 MEMORY DEFICIT: ICD-10-CM

## 2024-12-02 DIAGNOSIS — R26.9 ABNORMALITY OF GAIT AND MOBILITY: ICD-10-CM

## 2024-12-02 PROCEDURE — 3077F SYST BP >= 140 MM HG: CPT | Mod: CPTII,S$GLB,, | Performed by: PHYSICIAN ASSISTANT

## 2024-12-02 PROCEDURE — G0439 PPPS, SUBSEQ VISIT: HCPCS | Mod: S$GLB,,, | Performed by: PHYSICIAN ASSISTANT

## 2024-12-02 PROCEDURE — 3080F DIAST BP >= 90 MM HG: CPT | Mod: CPTII,S$GLB,, | Performed by: PHYSICIAN ASSISTANT

## 2024-12-02 PROCEDURE — 1160F RVW MEDS BY RX/DR IN RCRD: CPT | Mod: CPTII,S$GLB,, | Performed by: PHYSICIAN ASSISTANT

## 2024-12-02 PROCEDURE — 1123F ACP DISCUSS/DSCN MKR DOCD: CPT | Mod: CPTII,S$GLB,, | Performed by: PHYSICIAN ASSISTANT

## 2024-12-02 PROCEDURE — 1159F MED LIST DOCD IN RCRD: CPT | Mod: CPTII,S$GLB,, | Performed by: PHYSICIAN ASSISTANT

## 2024-12-02 PROCEDURE — 1126F AMNT PAIN NOTED NONE PRSNT: CPT | Mod: CPTII,S$GLB,, | Performed by: PHYSICIAN ASSISTANT

## 2024-12-02 PROCEDURE — 99999 PR PBB SHADOW E&M-EST. PATIENT-LVL V: CPT | Mod: PBBFAC,,, | Performed by: PHYSICIAN ASSISTANT

## 2024-12-02 NOTE — PROGRESS NOTES
"     General Cardiology Clinic Note  Reason for Visit: Syncope  Last Clinic Visit: 11/05/24   General Cardiologist: Dr. Watson    HPI:     Madeline Reilly is a 78 y.o. , who presents for follow-up of syncope     PROBLEM LIST:  HFpEF   Hypertension   Hyperlipidemia   CKD   Parkinson's   Dementia    Interval HPI:   Ms. Reilly presents for clinical follow up. She is accompanied by her son today. She states she is feeling "good today". Her son, Mr. Vazquez, states patient had one syncopal episode last week and a near-syncopal episode last night. Patient son was able to sit her down on time and help avoid syncopal episode. She denies any symptoms prior to these episodes and has no recollection following syncope. At our last visit we discussed abdominal compression device which patient obtained but has not been wearing due to discomfort. She reports a good appetite and has gained 3 lbs. She presents her home BP log today, labile BP's with rare hypotensive BP readings in the 80's, ranging mostly in the 120-150's systolic. Her BP today in clinic is 246/119, she has not taken Hydralazine yet. She denies chest pain/pressure/tightness/discomfort, dyspnea on exertion, orthopnea, PND, peripheral edema, palpitations or claudication.      Last visit with me: 11/05/24   Madeline Reilly presents today following visit to the ED on 10/31 after syncopal episode. History is complicated to obtain but patient is assisted by her daughter who is providing information today. It appears the patient was standing outside when her syncopal episode occurred and CPR was initiated by her son and daughter-in-law. It is unclear whether or not patient was pulseless, but CPR was administered for 14 minutes. When EMS arrived at the scene, patient had regained consciousness and appeared to be back at baseline. In ED her blood pressure 235/121, EKG negative for acute ischemic changes or dysrhythmia, head CT, CXR and UA unremarkable. She was " "administered Hydralazine with normalization of BP and discharged home.     Today, patient reports feeling lightheaded and a slight headache. Her blood pressure today in clinic is 99/54. She checks her blood pressure regularly at home but does not have her BP log with her today. Patient did experience a syncopal episode last night when getting up to use the restroom around 3 am. She did not hit her head and the fall was not witnessed, but she suffered a laceration to her lip from the fall. She denies any prodromal symptoms prior to the episode, however, it is unclear whether or not she truly did not have any symptoms prior to the fall. It is hard to obtain a history from the patient. Patient states that she experiences symptoms of lightheadedness "sometimes" before a syncopal episode, but not always. She has weaned off the Florinef completely and is not taking this anymore. A phone discussion was made with patients son to assist with patient history. Patient son states that her home BP readings have been within normal range with one hypotensive BP reading yesterday in the 90's systolic. Patient has required two doses of Hydralazine since her discharge for BP > 200 and it appears that hypotensive blood pressure readings are not the issue. Patient ambulates with her rollator-walker at home, with symptoms of lightheadedness and dizziness occurring more with positional changes. She becomes very lightheaded when walking down the clinic singh today, requiring a wheelchair. At her last clinic visit, abdominal compression was recommended for autonomic dysfunction but patient has not obtained this yet. She denies any denies chest pain/pressure/tightness/discomfort, dyspnea on exertion, orthopnea, PND, peripheral edema, palpitations or claudication.     Last visit with Dr. Watson:   Patient presenting as a referral from Internal Medicine for labile blood pressure. Patient reports that she has had high blood pressure for " years. At some point in the last few years she started having frequent falls and orthostatic hypotension. She was started on midodrine at 1st and then was taken off of this and started on Florinef. Since being put on Florinef she has had hypertension. Review of logs today shows systolic blood pressures between 110 and 190. Only 1 low blood pressure with systolic below 80 recorded. Her son who accompanied her to the visit states that she is sometimes lightheaded when she gets up from a seated position. She lives at the home with her son and daughter-in-law.      ROS:    Review of Systems   Constitutional: Negative.    HENT: Negative.     Eyes: Negative.    Respiratory: Negative.     Cardiovascular: Negative.    Gastrointestinal: Negative.    Genitourinary: Negative.    Musculoskeletal: Negative.    Skin: Negative.    Neurological:  Positive for weakness.   Endo/Heme/Allergies: Negative.    Psychiatric/Behavioral: Negative.       PMH:     Past Medical History:   Diagnosis Date    Arthritis     Asthma, chronic     Cataract     Hypertension     Orthostatic hypotension      Past Surgical History:   Procedure Laterality Date    CATARACT EXTRACTION      CATARACT EXTRACTION, BILATERAL      PARTIAL HYSTERECTOMY  1981     Allergies:     Review of patient's allergies indicates:   Allergen Reactions    Lisinopril Shortness Of Breath, Diarrhea and Nausea Only     All side affects from medication    Beef containing products     Bovine cartilage     Hydrochlorothiazide     Porcine skin     Pork/porcine containing products     Reserpine     Statins-hmg-coa reductase inhibitors Diarrhea and Nausea And Vomiting    Toprol xl [metoprolol succinate] Other (See Comments)     Low blood preasure    Aspirin Nausea And Vomiting     Medications:     Current Outpatient Medications on File Prior to Visit   Medication Sig Dispense Refill    acetaminophen (TYLENOL) 650 MG TbSR Take 1 tablet (650 mg total) by mouth every 8 (eight) hours as  "needed (back pain). 60 tablet 1    bismuth subsalicylate (BISMUTH ORAL) Take 15 mLs by mouth every 6 (six) hours as needed.      carbidopa-levodopa  mg (SINEMET)  mg per tablet Take 1 tablet by mouth 3 (three) times daily. 90 tablet 0    diaper,brief,adult,disposable Misc Use as dictioned 96 each 5    diclofenac sodium (VOLTAREN) 1 % Gel Apply 2 g topically 4 (four) times daily. (Patient taking differently: Apply 2 g topically 4 (four) times daily as needed.) 200 g 2    diphenhydrAMINE (BENADRYL) 25 mg capsule Take 25 mg by mouth every 6 (six) hours as needed for Itching.      EScitalopram oxalate (LEXAPRO) 10 MG tablet Take 1 tablet (10 mg total) by mouth once daily. 30 tablet 0    hydrALAZINE (APRESOLINE) 10 MG tablet Take 1 tablet (10 mg total) by mouth as needed (for systolic BP > 180). 30 tablet 11    multivitamin (THERAGRAN) per tablet Take 1 tablet by mouth once daily. 90 tablet 3    pantoprazole (PROTONIX) 40 MG tablet Take 1 tablet (40 mg total) by mouth once daily. 90 tablet 0    QUEtiapine (SEROQUEL) 25 MG Tab Take 1 tablet (25 mg total) by mouth every evening. Take this medication ~45 minutes prior to going to bed every night 90 tablet 0    rivastigmine (EXELON) 4.6 mg/24 hour PT24 Place 1 patch onto the skin once daily. 90 patch 0    THERA-M 9 mg iron-400 mcg Tab tablet Take 1 tablet by mouth once daily.       No current facility-administered medications on file prior to visit.     Social History:     Social History     Tobacco Use    Smoking status: Never     Passive exposure: Never    Smokeless tobacco: Never   Substance Use Topics    Alcohol use: No     Family History:     Family History   Problem Relation Name Age of Onset    Cancer Maternal Grandmother          "abdomen"    Cancer Maternal Aunt          "abdominal cancer"     Physical Exam:   BP (!) 246/119   Pulse 70   Ht 5' 6" (1.676 m)   Wt 54.1 kg (119 lb 4.3 oz)   SpO2 98%   BMI 19.25 kg/m²      Physical Exam  Constitutional:  "      General: She is not in acute distress.     Appearance: Normal appearance. She is normal weight. She is not ill-appearing.   HENT:      Head: Normocephalic and atraumatic.      Nose: Nose normal. No congestion or rhinorrhea.      Mouth/Throat:      Mouth: Mucous membranes are moist.      Pharynx: Oropharynx is clear. No oropharyngeal exudate or posterior oropharyngeal erythema.   Eyes:      General:         Right eye: No discharge.         Left eye: No discharge.      Extraocular Movements: Extraocular movements intact.      Conjunctiva/sclera: Conjunctivae normal.   Neck:      Vascular: No carotid bruit.   Cardiovascular:      Rate and Rhythm: Normal rate and regular rhythm.      Pulses: Normal pulses.           Carotid pulses are 2+ on the right side and 2+ on the left side.       Radial pulses are 2+ on the right side and 2+ on the left side.        Dorsalis pedis pulses are 2+ on the right side and 2+ on the left side.        Posterior tibial pulses are 2+ on the right side and 2+ on the left side.      Heart sounds: Normal heart sounds. No murmur heard.     No gallop.   Pulmonary:      Effort: Pulmonary effort is normal. No respiratory distress.      Breath sounds: Normal breath sounds. No wheezing or rales.   Musculoskeletal:         General: Swelling (R ankle) present. Normal range of motion.      Cervical back: Normal range of motion. No rigidity or tenderness.      Right lower leg: No edema.      Left lower leg: No edema.   Skin:     General: Skin is warm and dry.      Coloration: Skin is not jaundiced.      Findings: No bruising or lesion.   Neurological:      General: No focal deficit present.      Mental Status: She is alert and oriented to person, place, and time. Mental status is at baseline.      Cranial Nerves: No cranial nerve deficit.      Motor: No weakness.      Gait: Gait normal.   Psychiatric:         Mood and Affect: Mood normal.         Behavior: Behavior normal.         Thought Content:  Thought content normal.         Judgment: Judgment normal.        Labs:     Blood Tests:  Lab Results   Component Value Date     04/14/2023     04/03/2023    K 4.4 04/14/2023    K 4.4 04/03/2023     04/03/2023    CO2 24 04/14/2023    CO2 24 04/03/2023    BUN 33.0 (H) 04/14/2023    BUN 34 (H) 04/03/2023    BUN 15 09/29/2015    CREATININE 1.30 (H) 04/14/2023    CREATININE 1.9 (H) 04/03/2023    GLU 95 04/03/2023    HGBA1C 5.5 08/13/2023    HGBA1C 5.5 04/30/2019    AST 17 09/06/2022    ALT <5 (L) 09/06/2022    ALBUMIN 4.2 09/06/2022    PROT 7.4 09/06/2022    BILITOT 0.7 09/06/2022    WBC 5.0 01/13/2024    WBC 6.20 04/03/2023    HGB 11.3 (L) 01/13/2024    HGB 11.6 (L) 04/03/2023    HCT 35.1 01/13/2024    HCT 36.8 (L) 04/03/2023    MCV 91.4 01/13/2024    MCV 96 04/03/2023     04/03/2023    INR 1.0 08/16/2024    TSH 1.02 01/11/2024    TSH 2.501 06/22/2022       Lab Results   Component Value Date    CHOL 236 (H) 06/22/2022    HDL 73 06/22/2022    TRIG 59 06/22/2022       Lab Results   Component Value Date    LDLCALC 151.2 06/22/2022       Lab Results   Component Value Date    TSH 1.02 01/11/2024       Lab Results   Component Value Date    HGBA1C 5.5 08/13/2023     Imaging:     Echocardiogram  TTE 09/19/19  Normal left ventricular systolic function. The estimated ejection fraction is 60%  Grade II (moderate) left ventricular diastolic dysfunction consistent with pseudonormalization. Elevated left atrial pressure.  No wall motion abnormalities.  Concentric left ventricular remodeling.  Normal right ventricular systolic function.  The estimated PA systolic pressure is 27 mm Hg  Normal central venous pressure (3 mm Hg).  Small circumferential pericardial effusion. No tamponade physiology.     TTE 06/08/16  Findings:   Left ventricle is normal in size.   Left ventricle has normal systolic function with an ejection   fraction of 60%   Left ventricular hypertrophy is noted.   There are no obvious wall  motion abnormalities.   The left atrium is normal in size.   Abnormal diastolic function is noted.   The mitral valve appears normal . There is mild mitral regurgitation   The aortic valve is normal . There is no   aortic regurgitation.   The right ventricle is normal in size and function.   There is trivial tricuspid regurgitation with an estimated pulmonary   pressure of 20 mmHg.   The pulmonic valve was visualized and appears to have normal   anatomy. Mild pulmonary insuffiencey noted.   A small pericardial effusion is present.      Stress testing  None     Cath Lab  None     Other  Bilateral Carotid US 10/01/13  Conclusion:   No stenosis was identified.      EKG:   10/03/24:  Normal sinus rhythm   Normal ECG   When compared with ECG of 01-JUN-2022 14:35,   No significant change was found    Assessment:     1. Syncope and collapse    2. Autonomic dysfunction    3. Chronic diastolic congestive heart failure    4. Labile blood pressure    5. Parkinson's disease without dyskinesia, with fluctuating manifestations      Plan:     Syncope and collapse  Autonomic dysfunction  Patient and son report some improvement in syncopal episodes, she is being monitored by her family members and is cautious when moving around   Recommend she wear abdominal compression device, compression stockings, salt tabs and increased fluid intake   Has event monitor scheduled to rule out underlying conduction abnormality disorders     Chronic diastolic congestive heart failure  Euvolemic during examination  TTE 11/15/24 EF 50-55%     Labile blood pressure  Continue Hydralazine 10 mg TID prn for SBP > 200    Parkinson's disease without dyskinesia, with fluctuating manifestations  Has follow up with [facility]rology at [facility] next week       Follow up with Dr. Watsno     Signed:  Sharon Lagos, PA-C [facility]     12/5/2024 2:41 PM    Follow-up:     Future Appointments   Date Time Provider Department Center   12/19/2024  2:00 PM MONITOR,  ARRHYTHMIA EVENT [facility] ARRHPRO [facility]   1/15/2025  2:20 PM Sara Ku MD [id number] 65+ [place]   1/24/2025  2:00 PM Balbir Hernández MD [facility] CARDIO [facility]

## 2024-12-02 NOTE — PATIENT INSTRUCTIONS
Counseling and Referral of Other Preventative  (Italic type indicates deductible and co-insurance are waived)    Patient Name: Madeline Reilly  Today's Date: 12/2/2024    Health Maintenance       Date Due Completion Date    Pneumococcal Vaccines (Age 65+) (1 of 2 - PCV) Never done ---    Shingles Vaccine (1 of 2) Never done ---    RSV Vaccine (Age 60+ and Pregnant patients) (1 - 1-dose 75+ series) Never done ---    DEXA Scan 05/09/2024 5/9/2019    COVID-19 Vaccine (4 - 2024-25 season) 09/01/2024 12/9/2021    Lipid Panel 08/13/2028 8/13/2023    TETANUS VACCINE 01/03/2034 1/3/2024        No orders of the defined types were placed in this encounter.    The following information is provided to all patients.  This information is to help you find resources for any of the problems found today that may be affecting your health:                  Living healthy guide: www.[facility].[place].[facility]      Understanding Diabetes: www.diabetes.org      Eating healthy: www.cdc.gov/healthyweight      [facility] home safety checklist: www.cdc.gov/steadi/patient.html      Agency on Aging: www.goea.[place].[facility]      Alcoholics anonymous (AA): www.aa.org      Physical Activity: www.nadeem.nih.gov/ms1uizu      Tobacco use: www.quitwithusla.org

## 2024-12-03 PROBLEM — R63.4 UNINTENTIONAL WEIGHT LOSS: Status: RESOLVED | Noted: 2020-11-16 | Resolved: 2024-12-03

## 2024-12-03 PROBLEM — N18.32 CKD STAGE 3B, GFR 30-44 ML/MIN: Status: ACTIVE | Noted: 2023-04-06

## 2024-12-03 PROBLEM — I82.4Y9 DEEP VEIN THROMBOSIS (DVT) OF PROXIMAL LOWER EXTREMITY, UNSPECIFIED CHRONICITY, UNSPECIFIED LATERALITY: Status: RESOLVED | Noted: 2024-10-03 | Resolved: 2024-12-03

## 2024-12-03 NOTE — ASSESSMENT & PLAN NOTE
0/5 on mini cog  Has an appt with neuro next week. Was on donepezil but stopped due to unknown reason  Neuropysch testing was recommended in past, never done  She lives with her son

## 2024-12-03 NOTE — PROGRESS NOTES
Madeline Reilly presented for an initial Medicare AWV today. The following components were reviewed and updated:    Medical history  Family History  Social history  Allergies and Current Medications  Health Risk Assessment  Health Maintenance  Care Team    **See Completed Assessments for Annual Wellness visit with in the encounter summary    The following assessments were completed:  Depression Screening  Cognitive function Screening  Timed Get Up Test  Whisper Test      Opioid documentation:      Patient does not have a current opioid prescription.          Vitals:    12/02/24 1452   BP: (!) 175/92   BP Location: Left arm   Patient Position: Sitting   Pulse: 67   SpO2: 98%   Weight: 53.3 kg (117 lb 6.3 oz)     Body mass index is 18.96 kg/m².       Physical Exam  Constitutional:       Appearance: Normal appearance.   HENT:      Head: Normocephalic and atraumatic.   Cardiovascular:      Rate and Rhythm: Normal rate and regular rhythm.   Pulmonary:      Effort: Pulmonary effort is normal.      Breath sounds: Normal breath sounds.   Neurological:      Mental Status: She is alert.   Psychiatric:         Mood and Affect: Mood normal.         Thought Content: Thought content normal.           Diagnoses and health risks identified today and associated recommendations/orders:  1. Encounter for preventive health examination    2. Encounter for Medicare annual wellness exam  -     Ambulatory Referral/Consult to Enhanced Annual Wellness Visit (eAWV)    3. Dependence on other enabling machines and devices  Uses walker no therapy    4. Abnormality of gait and mobility  Uses walker no therapy     5. Other reduced mobility  Uses walker no therapy    6. Labile blood pressure  Overview:  - PMH of labile BP  - Followed by Cardiology  - Discontinued fludrocortisone and anti-hypertensive medications  - PRN hydralazine for SBP >180mmHg    Assessment & Plan:  - BP elevated today, will see cardiology this week for f/u and WINDY after  -  Recommended hydralazine up to 3 times daily for hypertensive episodes   - Daily home BP checks/logs          7. Chronic diastolic congestive heart failure  Assessment & Plan:  Results for TTE orders placed in visit on 09/19/19  · Normal left ventricular systolic function. The estimated ejection fraction is 60%  · Grade II (moderate) left ventricular diastolic dysfunction consistent with pseudonormalization. Elevated left atrial pressure.  · No wall motion abnormalities.  · Concentric left ventricular remodeling.  · Normal right ventricular systolic function.  · The estimated PA systolic pressure is 27 mm Hg  · Normal central venous pressure (3 mm Hg).  · Small circumferential pericardial effusion. No tamponade physiology.  - Pt is not on medical management currently, while trending labile BP  - Monitor strict I&Os and daily weights.    - Daily BP measurements/logs  - Low sodium diet  - Place on fluid restriction of 1.5 L.   - Cardiology is following  - The patient's volume status is at her baseline      8. Memory deficit  Assessment & Plan:  0/5 on mini cog  Has an appt with neuro next week. Was on donepezil but stopped due to unknown reason  Neuropysch testing was recommended in past, never done  She lives with her son       9. Chronic kidney disease (CKD) stage G3b/A2, moderately decreased glomerular filtration rate (GFR) between 30-44 mL/min/1.73 square meter and albuminuria creatinine ratio between  mg/g  Assessment & Plan:  Stable, avoid NSAIDs and nephrotoxic medication   Adequate hydration   Monitor with regular metabolic panel and urine microalbumin  Arb stopped due to labile htn?  Sglt2?            Provided Madeline with a 5-10 year written screening schedule and personal prevention plan. Recommendations were developed using the USPSTF age appropriate recommendations. Education, counseling, and referrals were provided as needed.  After Visit Summary printed and given to patient which includes a list of  additional screenings\tests needed.    No follow-ups on file.      Malissa Eaton PA-C

## 2024-12-03 NOTE — ASSESSMENT & PLAN NOTE
- BP elevated today, will see cardiology this week for f/u and WINDY after  - Recommended hydralazine up to 3 times daily for hypertensive episodes   - Daily home BP checks/logs

## 2024-12-03 NOTE — ASSESSMENT & PLAN NOTE
Stable, avoid NSAIDs and nephrotoxic medication   Adequate hydration   Monitor with regular metabolic panel and urine microalbumin  Arb stopped due to labile htn?  Sglt2?

## 2024-12-04 PROBLEM — G90.9 AUTONOMIC DYSFUNCTION: Status: ACTIVE | Noted: 2024-12-04

## 2024-12-05 ENCOUNTER — OFFICE VISIT (OUTPATIENT)
Dept: CARDIOLOGY | Facility: CLINIC | Age: 79
End: 2024-12-05
Payer: MEDICARE

## 2024-12-05 VITALS
OXYGEN SATURATION: 98 % | DIASTOLIC BLOOD PRESSURE: 119 MMHG | HEART RATE: 70 BPM | BODY MASS INDEX: 19.16 KG/M2 | WEIGHT: 119.25 LBS | SYSTOLIC BLOOD PRESSURE: 246 MMHG | HEIGHT: 66 IN

## 2024-12-05 DIAGNOSIS — G20.A2 PARKINSON'S DISEASE WITHOUT DYSKINESIA, WITH FLUCTUATING MANIFESTATIONS: ICD-10-CM

## 2024-12-05 DIAGNOSIS — R55 SYNCOPE AND COLLAPSE: Primary | ICD-10-CM

## 2024-12-05 DIAGNOSIS — I50.32 CHRONIC DIASTOLIC CONGESTIVE HEART FAILURE: ICD-10-CM

## 2024-12-05 DIAGNOSIS — R09.89 LABILE BLOOD PRESSURE: ICD-10-CM

## 2024-12-05 DIAGNOSIS — G90.9 AUTONOMIC DYSFUNCTION: ICD-10-CM

## 2024-12-05 PROCEDURE — 1100F PTFALLS ASSESS-DOCD GE2>/YR: CPT | Mod: CPTII,S$GLB,,

## 2024-12-05 PROCEDURE — 3080F DIAST BP >= 90 MM HG: CPT | Mod: CPTII,S$GLB,,

## 2024-12-05 PROCEDURE — 1157F ADVNC CARE PLAN IN RCRD: CPT | Mod: CPTII,S$GLB,,

## 2024-12-05 PROCEDURE — 1159F MED LIST DOCD IN RCRD: CPT | Mod: CPTII,S$GLB,,

## 2024-12-05 PROCEDURE — 3077F SYST BP >= 140 MM HG: CPT | Mod: CPTII,S$GLB,,

## 2024-12-05 PROCEDURE — 99999 PR PBB SHADOW E&M-EST. PATIENT-LVL IV: CPT | Mod: PBBFAC,,,

## 2024-12-05 PROCEDURE — 99214 OFFICE O/P EST MOD 30 MIN: CPT | Mod: S$GLB,,,

## 2024-12-05 PROCEDURE — 3288F FALL RISK ASSESSMENT DOCD: CPT | Mod: CPTII,S$GLB,,

## 2024-12-05 PROCEDURE — 1125F AMNT PAIN NOTED PAIN PRSNT: CPT | Mod: CPTII,S$GLB,,

## 2024-12-05 RX ORDER — MULTIPLE VITAMINS W/ MINERALS TAB 9MG-400MCG
1 TAB ORAL DAILY
COMMUNITY
Start: 2024-10-29

## 2024-12-17 ENCOUNTER — PATIENT MESSAGE (OUTPATIENT)
Dept: PRIMARY CARE CLINIC | Facility: CLINIC | Age: 79
End: 2024-12-17
Payer: MEDICARE

## 2024-12-18 ENCOUNTER — TELEPHONE (OUTPATIENT)
Dept: CARDIOLOGY | Facility: HOSPITAL | Age: 79
End: 2024-12-18
Payer: MEDICARE

## 2024-12-18 NOTE — TELEPHONE ENCOUNTER
Spoke with patient son to confirm appointment.  Patient son stated they will come into clinic to get the monitor applied.

## 2024-12-19 ENCOUNTER — CLINICAL SUPPORT (OUTPATIENT)
Dept: CARDIOLOGY | Facility: HOSPITAL | Age: 79
End: 2024-12-19
Payer: MEDICARE

## 2024-12-19 DIAGNOSIS — R55 SYNCOPE AND COLLAPSE: ICD-10-CM

## 2024-12-19 DIAGNOSIS — G90.9 AUTONOMIC DYSFUNCTION: ICD-10-CM

## 2024-12-19 PROCEDURE — 93271 ECG/MONITORING AND ANALYSIS: CPT

## 2024-12-26 ENCOUNTER — PATIENT MESSAGE (OUTPATIENT)
Dept: PRIMARY CARE CLINIC | Facility: CLINIC | Age: 79
End: 2024-12-26
Payer: MEDICARE

## 2025-01-02 ENCOUNTER — PATIENT MESSAGE (OUTPATIENT)
Dept: PRIMARY CARE CLINIC | Facility: CLINIC | Age: 80
End: 2025-01-02
Payer: MEDICARE

## 2025-01-02 DIAGNOSIS — F05 SUNDOWNING: Primary | ICD-10-CM

## 2025-01-02 RX ORDER — ESCITALOPRAM OXALATE 10 MG/1
10 TABLET ORAL DAILY
Qty: 30 TABLET | Refills: 0 | Status: SHIPPED | OUTPATIENT
Start: 2025-01-02 | End: 2025-01-02 | Stop reason: SDUPTHER

## 2025-01-02 RX ORDER — RIVASTIGMINE 4.6 MG/24H
1 PATCH, EXTENDED RELEASE TRANSDERMAL DAILY
Qty: 90 PATCH | Refills: 0 | Status: SHIPPED | OUTPATIENT
Start: 2025-01-02 | End: 2025-04-02

## 2025-01-02 RX ORDER — ESCITALOPRAM OXALATE 10 MG/1
10 TABLET ORAL DAILY
Qty: 30 TABLET | Refills: 0 | Status: SHIPPED | OUTPATIENT
Start: 2025-01-02

## 2025-01-02 RX ORDER — RIVASTIGMINE 4.6 MG/24H
1 PATCH, EXTENDED RELEASE TRANSDERMAL DAILY
Qty: 90 PATCH | Refills: 0 | Status: SHIPPED | OUTPATIENT
Start: 2025-01-02 | End: 2025-01-02 | Stop reason: SDUPTHER

## 2025-01-15 ENCOUNTER — OFFICE VISIT (OUTPATIENT)
Dept: PRIMARY CARE CLINIC | Facility: CLINIC | Age: 80
End: 2025-01-15
Payer: MEDICARE

## 2025-01-15 ENCOUNTER — TELEPHONE (OUTPATIENT)
Dept: PRIMARY CARE CLINIC | Facility: CLINIC | Age: 80
End: 2025-01-15
Payer: MEDICARE

## 2025-01-15 ENCOUNTER — HOSPITAL ENCOUNTER (OUTPATIENT)
Dept: RADIOLOGY | Facility: HOSPITAL | Age: 80
Discharge: HOME | End: 2025-01-15
Attending: PHYSICIAN ASSISTANT
Payer: MEDICARE

## 2025-01-15 VITALS
SYSTOLIC BLOOD PRESSURE: 140 MMHG | DIASTOLIC BLOOD PRESSURE: 106 MMHG | BODY MASS INDEX: 19.32 KG/M2 | OXYGEN SATURATION: 99 % | WEIGHT: 119.69 LBS | HEART RATE: 66 BPM

## 2025-01-15 DIAGNOSIS — I13.0 HYPERTENSIVE HEART AND KIDNEY DISEASE WITH CHRONIC DIASTOLIC CONGESTIVE HEART FAILURE AND STAGE 3B CHRONIC KIDNEY DISEASE: ICD-10-CM

## 2025-01-15 DIAGNOSIS — R07.81 RIB PAIN ON LEFT SIDE: Primary | ICD-10-CM

## 2025-01-15 DIAGNOSIS — G20.A2 PARKINSON'S DISEASE WITHOUT DYSKINESIA, WITH FLUCTUATING MANIFESTATIONS: ICD-10-CM

## 2025-01-15 DIAGNOSIS — I50.32 HYPERTENSIVE HEART AND KIDNEY DISEASE WITH CHRONIC DIASTOLIC CONGESTIVE HEART FAILURE AND STAGE 3B CHRONIC KIDNEY DISEASE: ICD-10-CM

## 2025-01-15 DIAGNOSIS — N18.32 HYPERTENSIVE HEART AND KIDNEY DISEASE WITH CHRONIC DIASTOLIC CONGESTIVE HEART FAILURE AND STAGE 3B CHRONIC KIDNEY DISEASE: ICD-10-CM

## 2025-01-15 DIAGNOSIS — N18.32 CHRONIC KIDNEY DISEASE (CKD) STAGE G3B/A2, MODERATELY DECREASED GLOMERULAR FILTRATION RATE (GFR) BETWEEN 30-44 ML/MIN/1.73 SQUARE METER AND ALBUMINURIA CREATININE RATIO BETWEEN 30-299 MG/G: ICD-10-CM

## 2025-01-15 DIAGNOSIS — R09.89 LABILE BLOOD PRESSURE: Primary | ICD-10-CM
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
## 2025-01-15 PROCEDURE — 99999 PR PBB SHADOW E&M-EST. PATIENT-LVL IV: CPT | Mod: PBBFAC,,, | Performed by: PHYSICIAN ASSISTANT

## 2025-01-15 PROCEDURE — 1157F ADVNC CARE PLAN IN RCRD: CPT | Mod: CPTII,S$GLB,, | Performed by: PHYSICIAN ASSISTANT

## 2025-01-15 PROCEDURE — 1160F RVW MEDS BY RX/DR IN RCRD: CPT | Mod: CPTII,S$GLB,, | Performed by: PHYSICIAN ASSISTANT

## 2025-01-15 PROCEDURE — 3077F SYST BP >= 140 MM HG: CPT | Mod: CPTII,S$GLB,, | Performed by: PHYSICIAN ASSISTANT

## 2025-01-15 PROCEDURE — 3288F FALL RISK ASSESSMENT DOCD: CPT | Mod: CPTII,S$GLB,, | Performed by: PHYSICIAN ASSISTANT

## 2025-01-15 PROCEDURE — 1125F AMNT PAIN NOTED PAIN PRSNT: CPT | Mod: CPTII,S$GLB,, | Performed by: PHYSICIAN ASSISTANT

## 2025-01-15 PROCEDURE — 71100 X-RAY EXAM RIBS UNI 2 VIEWS: CPT | Mod: TC,LT

## 2025-01-15 PROCEDURE — 99214 OFFICE O/P EST MOD 30 MIN: CPT | Mod: S$GLB,,, | Performed by: PHYSICIAN ASSISTANT

## 2025-01-15 PROCEDURE — 1159F MED LIST DOCD IN RCRD: CPT | Mod: CPTII,S$GLB,, | Performed by: PHYSICIAN ASSISTANT

## 2025-01-15 PROCEDURE — 1101F PT FALLS ASSESS-DOCD LE1/YR: CPT | Mod: CPTII,S$GLB,, | Performed by: PHYSICIAN ASSISTANT

## 2025-01-15 PROCEDURE — 3080F DIAST BP >= 90 MM HG: CPT | Mod: CPTII,S$GLB,, | Performed by: PHYSICIAN ASSISTANT

## 2025-01-15 PROCEDURE — 71100 X-RAY EXAM RIBS UNI 2 VIEWS: CPT | Mod: 26,LT,, | Performed by: INTERNAL MEDICINE

## 2025-01-15 RX ORDER — LIDOCAINE 50 MG/G
1 PATCH TOPICAL DAILY
Qty: 9 PATCH | Refills: 2 | Status: SHIPPED | OUTPATIENT
Start: 2025-01-15

## 2025-01-15 NOTE — PROGRESS NOTES
Primary Care Provider Appointment   [place] [address] [facility]        Subjective:       Patient ID:  Ms. Correa is a 79 y.o. female being seen for labile blood pressure      Chief Complaint: labile blood pressure    Hypertension  This is a chronic problem. The current episode started more than 1 year ago. The problem has been waxing and waning since onset. The problem is resistant. Pertinent negatives include no chest pain, headaches or shortness of breath. There are no associated agents to hypertension. There are no known risk factors for coronary artery disease. Past treatments include nothing. The current treatment provides mild improvement. There are no compliance problems.    : 78 yo female presents for HTN follow up, falls.   Had a fall Saturday. Son was home but did not see her fall, just ran into room after. Was awake and alert. Has a cut on her head, bled a lot. Since complaining of pain under her left arm since. Takes tylenol for pain. No concerning behavior changes, HA, blurry vision, slurred speech since fall.   Has a WINDY in place. 2 more days left. Son states has had several syncopal episodes just since wearing the monitor. Has had 2 in the past week. Her BP continues to fluctuate greatly (see below). They continue to give her hydralazine 10 mg only if her BP is over 180 systolic.             Past Medical History:   Diagnosis Date    Arthritis     Asthma, chronic     Cataract     Hypertension     Orthostatic hypotension        Review of Systems   Constitutional:  Negative for fatigue.   Eyes:  Negative for visual disturbance.   Respiratory:  Negative for shortness of breath.    Cardiovascular:  Negative for chest pain.   Neurological:  Positive for syncope, weakness and memory loss. Negative for facial asymmetry and headaches.             Health Maintenance         Date Due Completion Date    Shingles Vaccine (1 of 2) Never done ---    DEXA Scan 05/09/2024 5/9/2019    COVID-19  "Vaccine (4 - 2024-25 season) 09/01/2024 12/9/2021    Lipid Panel 08/13/2028 8/13/2023    TETANUS VACCINE 01/03/2034 1/3/2024                     Objective:      Vitals:    01/15/25 1515   BP: (!) 140/106   BP Location: Right arm   Patient Position: Sitting   Pulse: 66   SpO2: 99%   Weight: 54.3 kg (119 lb 11.4 oz)     Estimated body mass index is 19.32 kg/m² as calculated from the following:    Height as of 12/5/24: 5' 6" (1.676 m).    Weight as of this encounter: 54.3 kg (119 lb 11.4 oz).  Physical Exam  Constitutional:       Appearance: Normal appearance.   HENT:      Head: Normocephalic.     Neurological:      Mental Status: She is alert. Mental status is at baseline.           Assessment and Plan:         1. Labile blood pressure  Overview:  - PMH of labile BP  - Followed by Cardiology  - Discontinued fludrocortisone and anti-hypertensive medications  - PRN hydralazine for SBP >180mmHg    Assessment & Plan:  Discussed with son trying to get her on a more stable regimen. That she is at high risk for MI or CVA when her pressure is going up into the 200s systolic. Also concern about her risk of ESRD.   Discussed adding low dose ARB to take daily if her pressure is not low. Discussed if her pressure is low, to give her something salty to eat or drink to bring up her BP  Son is very hesitant to start medication due to syncopal episodes and wants to discuss with cardiology next week when they discuss results of WINDY      2. Chronic kidney disease (CKD) stage G3b/A2, moderately decreased glomerular filtration rate (GFR) between 30-44 mL/min/1.73 square meter and albuminuria creatinine ratio between  mg/g  Assessment & Plan:  Discussed with son adding SLGT2 like jardiance to help with CKD and CHF  He wants to discuss with her cardiologist       3. Fall, initial encounter  Overview:  - History of falls in setting of labile BP likely associated with autonomic dysfunction  - Last fall on 11/06/24    Assessment & " Plan:  No fracture on xray  Prescribed lidoderm patch as safest option for patient. Can continue tylenol     Orders:  -     X-Ray Ribs 2 View Left; Future; Expected date: 01/15/2025    4. 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 Parkinson's disease without dyskinesia, with fluctuating manifestations  Overview:  - Managed with carbidopa-levodopa 25-100mg TID.  - Followed by Neurology    Assessment & Plan:  Continue current therapy           Follow Up:   F/u in 1 month        Amy Lado, PA-C [facility]

## 2025-01-16 ENCOUNTER — PATIENT MESSAGE (OUTPATIENT)
Dept: PRIMARY CARE CLINIC | Facility: CLINIC | Age: 80
End: 2025-01-16
Payer: MEDICARE

## 2025-01-16 PROBLEM — N18.32 HYPERTENSIVE HEART AND KIDNEY DISEASE WITH CHRONIC DIASTOLIC CONGESTIVE HEART FAILURE AND STAGE 3B CHRONIC KIDNEY DISEASE: Status: ACTIVE | Noted: 2024-10-22
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
## 2025-01-16 NOTE — ASSESSMENT & PLAN NOTE
No fracture on xray  Prescribed lidoderm patch as safest option for patient. Can continue tylenol

## 2025-01-16 NOTE — ASSESSMENT & PLAN NOTE
Discussed with son adding SLGT2 like jardiance to help with CKD and CHF  He wants to discuss with her cardiologist

## 2025-01-16 NOTE — ASSESSMENT & PLAN NOTE
Discussed with son trying to get her on a more stable regimen. That she is at high risk for MI or CVA when her pressure is going up into the 200s systolic. Also concern about her risk of ESRD.   Discussed adding low dose ARB to take daily if her pressure is not low. Discussed if her pressure is low, to give her something salty to eat or drink to bring up her BP  Son is very hesitant to start medication due to syncopal episodes and wants to discuss with cardiology next week when they discuss results of WINDY

## 2025-01-18 ENCOUNTER — ON-DEMAND VIRTUAL (OUTPATIENT)
Dept: URGENT CARE | Facility: CLINIC | Age: 80
End: 2025-01-18
Payer: MEDICARE

## 2025-01-18 DIAGNOSIS — M25.512 ACUTE PAIN OF LEFT SHOULDER: Primary | ICD-10-CM

## 2025-01-18 RX ORDER — TIZANIDINE 4 MG/1
4 TABLET ORAL EVERY 6 HOURS PRN
Qty: 20 TABLET | Refills: 0 | Status: SHIPPED | OUTPATIENT
Start: 2025-01-18

## 2025-01-18 NOTE — PROGRESS NOTES
Subjective:      Patient ID: Madeline Reilly is a 79 y.o. female.    Vitals:  vitals were not taken for this visit.     Chief Complaint: Fall (Pain on left side)      Visit Type: TELE AUDIOVISUAL - This visit was conducted virtually based on  subjective information and limited objective exam    Present with the patient at the time of consultation: TELEMED PRESENT WITH PATIENT: family member  LOCATION OF PATIENT [place]  Two patient identifiers used to verify patient- saying out date of birth and full name.       Past Medical History:   Diagnosis Date    Arthritis     Asthma, chronic     Cataract     Hypertension     Orthostatic hypotension      Past Surgical History:   Procedure Laterality Date    CATARACT EXTRACTION      CATARACT EXTRACTION, BILATERAL      PARTIAL HYSTERECTOMY  1981     Review of patient's allergies indicates:   Allergen Reactions    Lisinopril Shortness Of Breath, Diarrhea and Nausea Only     All side affects from medication    Beef containing products     Bovine cartilage     Hydrochlorothiazide     Porcine skin     Pork/porcine containing products     Reserpine     Statins-hmg-coa reductase inhibitors Diarrhea and Nausea And Vomiting    Toprol xl [metoprolol succinate] Other (See Comments)     Low blood preasure    Aspirin Nausea And Vomiting     Current Outpatient Medications on File Prior to Visit   Medication Sig Dispense Refill    acetaminophen (TYLENOL) 650 MG TbSR Take 1 tablet (650 mg total) by mouth every 8 (eight) hours as needed (back pain). 60 tablet 1    bismuth subsalicylate (BISMUTH ORAL) Take 15 mLs by mouth every 6 (six) hours as needed.      carbidopa-levodopa  mg (SINEMET)  mg per tablet Take 1 tablet by mouth 3 (three) times daily. 90 tablet 0    diaper,brief,adult,disposable Misc Use as dictioned 96 each 5    diclofenac sodium (VOLTAREN) 1 % Gel Apply 2 g topically 4 (four) times daily. (Patient taking differently: Apply 2 g topically 4 (four) times  "daily as needed.) 200 g 2    diphenhydrAMINE (BENADRYL) 25 mg capsule Take 25 mg by mouth every 6 (six) hours as needed for Itching.      EScitalopram oxalate (LEXAPRO) 10 MG tablet Take 1 tablet (10 mg total) by mouth once daily. 30 tablet 0    hydrALAZINE (APRESOLINE) 10 MG tablet Take 1 tablet (10 mg total) by mouth as needed (for systolic BP > 180). 30 tablet 11    LIDOcaine (LIDODERM) 5 % Place 1 patch onto the skin once daily. Remove & Discard patch within 12 hours or as directed by MD Hernandez patch 2    multivitamin (THERAGRAN) per tablet Take 1 tablet by mouth once daily. 90 tablet 3    pantoprazole (PROTONIX) 40 MG tablet Take 1 tablet (40 mg total) by mouth once daily. 90 tablet 0    QUEtiapine (SEROQUEL) 25 MG Tab Take 1 tablet (25 mg total) by mouth every evening. Take this medication ~45 minutes prior to going to bed every night 90 tablet 0    rivastigmine (EXELON) 4.6 mg/24 hour PT24 Place 1 patch onto the skin once daily. 90 patch 0    THERA-M 9 mg iron-400 mcg Tab tablet Take 1 tablet by mouth once daily.       No current facility-administered medications on file prior to visit.     Family History   Problem Relation Name Age of Onset    Cancer Maternal Grandmother          "abdomen"    Cancer Maternal Aunt          "abdominal cancer"       Medications Ordered                [id number] [address] [address]    Telephone: [phone]   Fax: [phone]   Hours: Not open 24 hours                         E-Prescribed (1 of 1)              tiZANidine (ZANAFLEX) 4 MG tablet    Sig: Take 1 tablet (4 mg total) by mouth every 6 (six) hours as needed (pain).       Start: 1/18/25     Quantity: 20 tablet Refills: 0                           Ohs Peq Odvv Intake    1/18/2025 10:33 AM CST - Filed by Patient   What is your current physical address in the event of a medical emergency? [address] [place] [address]   Are you able to take your vital signs? No   Please attach any relevant images or files    Is your employer contracted with [facility] [employer]? No         Son calling on behalf of 78 yo with parkinson and dementia. He states fall was one week ago fell and was seen and had xrays. Still with c/o left shoulder pain radiating down arm. Per son, they have used lidocaine patch and voltaren without relief. She states pain on side area. No bruising or swelling to area      ROS     Objective:   The physical exam was conducted virtually.    AAO x 3 ; no acute distress noted; appearance normal; mood and behavior normal; thought process normal  Head- normocephalic  Nose- appears normal, no discharge or erythema  Eyes- pupils appear normal in size, no drainage, no erythema  Ears- normal appearing; no discharge, no erythema  Mouth- appears normal  Oropharynx- no erythema, lesions  Lungs- breathing at a normal rate, no acute distress noted  Heart- no reports of tachycardia, palpitations, chest pain  Abdomen- non distended, non tender reported by patient  Skin- warm and dry, no erythema or edema noted by patient or visualized  Psych- as above; no si/hi    Xray review without fracture  Assessment:     1. Acute pain of left shoulder        Plan:     Will do muscle relaxant - discussed fall risks with son and his wife.         Thank you for choosing [facility]!    Our goal in the [facility] is to always provide outstanding medical care. You may receive a survey by mail or e-mail in the next week regarding your experience today. We would greatly appreciate you completing and returning the survey. Your feedback provides us with a way to recognize our staff who provide very good care, and it helps us learn how to improve when your experience was below our aspiration of excellence.         We appreciate you trusting us with your medical care. We hope you feel better soon. We will be  happy to take care of you for all of your future medical needs.    You must understand that you've received an Urgent Care treatment only and that you may be released before all your medical problems are known or treated. You, the patient, will arrange for follow up care as instructed.    Follow up with your PCP or specialty clinic as directed in the next 1-2 weeks if not improved or as needed.  You can call [phone] to schedule an appointment with the appropriate provider.    If your condition worsens we recommend that you receive another evaluation in person, with your primary care provider, urgent care or at the emergency room immediately or contact your primary medical clinics after hours call service to discuss your concerns.         Acute pain of left shoulder  -     tiZANidine (ZANAFLEX) 4 MG tablet; Take 1 tablet (4 mg total) by mouth every 6 (six) hours as needed (pain).  Dispense: 20 tablet; Refill: 0

## 2025-01-21 ENCOUNTER — PATIENT MESSAGE (OUTPATIENT)
Dept: PRIMARY CARE CLINIC | Facility: CLINIC | Age: 80
End: 2025-01-21
Payer: MEDICARE

## 2025-01-21 DIAGNOSIS — F05 SUNDOWNING: ICD-10-CM

## 2025-01-21 RX ORDER — PANTOPRAZOLE SODIUM 40 MG/1
40 TABLET, DELAYED RELEASE ORAL DAILY
Qty: 90 TABLET | Refills: 0 | Status: SHIPPED | OUTPATIENT
Start: 2025-01-21 | End: 2025-04-21

## 2025-01-21 RX ORDER — MULTIVITAMIN
1 TABLET ORAL DAILY
Qty: 90 TABLET | Refills: 3 | Status: SHIPPED | OUTPATIENT
Start: 2025-01-21 | End: 2026-01-16

## 2025-01-28 ENCOUNTER — PATIENT MESSAGE (OUTPATIENT)
Dept: PRIMARY CARE CLINIC | Facility: CLINIC | Age: 80
End: 2025-01-28
Payer: MEDICARE

## 2025-01-31 ENCOUNTER — PATIENT MESSAGE (OUTPATIENT)
Dept: CARDIOLOGY | Facility: CLINIC | Age: 80
End: 2025-01-31
Payer: MEDICARE

## 2025-01-31 ENCOUNTER — TELEPHONE (OUTPATIENT)
Dept: PRIMARY CARE CLINIC | Facility: CLINIC | Age: 80
End: 2025-01-31
Payer: MEDICARE

## 2025-01-31 NOTE — TELEPHONE ENCOUNTER
Call to [facility]  @ [phone] ext [phone] for assistance with admission packet details.     No answer, LVM for Clarisse Alvarez for return call.

## 2025-02-03 NOTE — TELEPHONE ENCOUNTER
Call to son to complete [facility] application. After signature to be faxed to facility and copy to family.

## 2025-02-03 NOTE — TELEPHONE ENCOUNTER
Call to [facility]   left for Clarisse Alvarez requesting call back regarding orders for .

## 2025-02-06 ENCOUNTER — LAB VISIT (OUTPATIENT)
Dept: LAB | Facility: HOSPITAL | Age: 80
End: 2025-02-06
Payer: MEDICARE

## 2025-02-06 DIAGNOSIS — N18.4 CKD (CHRONIC KIDNEY DISEASE) STAGE 4, GFR 15-29 ML/MIN: ICD-10-CM

## 2025-02-06 DIAGNOSIS — R09.89 LABILE BLOOD PRESSURE: ICD-10-CM

## 2025-02-06 DIAGNOSIS — R79.9 ABNORMAL FINDING OF BLOOD CHEMISTRY, UNSPECIFIED: ICD-10-CM

## 2025-02-06 LAB
CHOLEST SERPL-MCNC: 216 MG/DL (ref 120–199)
CHOLEST/HDLC SERPL: 3.1 {RATIO} (ref 2–5)
ESTIMATED AVG GLUCOSE: 108 MG/DL (ref 68–131)
HBA1C MFR BLD: 5.4 % (ref 4–5.6)
HDLC SERPL-MCNC: 70 MG/DL (ref 40–75)
HDLC SERPL: 32.4 % (ref 20–50)
LDLC SERPL CALC-MCNC: 131.2 MG/DL (ref 63–159)
NONHDLC SERPL-MCNC: 146 MG/DL
TRIGL SERPL-MCNC: 74 MG/DL (ref 30–150)

## 2025-02-06 PROCEDURE — 80061 LIPID PANEL: CPT | Performed by: INTERNAL MEDICINE

## 2025-02-06 PROCEDURE — 36415 COLL VENOUS BLD VENIPUNCTURE: CPT | Mod: PN

## 2025-02-06 PROCEDURE — 83835 ASSAY OF METANEPHRINES: CPT

## 2025-02-06 PROCEDURE — 84244 ASSAY OF RENIN: CPT

## 2025-02-06 PROCEDURE — 83036 HEMOGLOBIN GLYCOSYLATED A1C: CPT | Performed by: INTERNAL MEDICINE

## 2025-02-09 ENCOUNTER — PATIENT MESSAGE (OUTPATIENT)
Dept: PRIMARY CARE CLINIC | Facility: CLINIC | Age: 80
End: 2025-02-09
Payer: MEDICARE

## 2025-02-10 LAB
ALDOST SERPL-MCNC: 14.4 NG/DL
ALDOST/RENIN PLAS-RTO: 72 RATIO
RENIN PLAS-CCNC: 0.2 NG/ML/HR

## 2025-02-11 ENCOUNTER — PATIENT MESSAGE (OUTPATIENT)
Dept: PRIMARY CARE CLINIC | Facility: CLINIC | Age: 80
End: 2025-02-11
Payer: MEDICARE

## 2025-02-11 DIAGNOSIS — F05 SUNDOWNING: ICD-10-CM

## 2025-02-12 ENCOUNTER — TELEPHONE (OUTPATIENT)
Dept: PRIMARY CARE CLINIC | Facility: CLINIC | Age: 80
End: 2025-02-12
Payer: MEDICARE

## 2025-02-12 ENCOUNTER — OFFICE VISIT (OUTPATIENT)
Dept: PRIMARY CARE CLINIC | Facility: CLINIC | Age: 80
End: 2025-02-12
Payer: MEDICARE

## 2025-02-12 VITALS
SYSTOLIC BLOOD PRESSURE: 122 MMHG | DIASTOLIC BLOOD PRESSURE: 82 MMHG | WEIGHT: 118.81 LBS | BODY MASS INDEX: 19.18 KG/M2 | OXYGEN SATURATION: 95 % | HEART RATE: 59 BPM

## 2025-02-12 DIAGNOSIS — G20.A2 PARKINSON'S DISEASE WITHOUT DYSKINESIA, WITH FLUCTUATING MANIFESTATIONS: Primary | ICD-10-CM

## 2025-02-12 DIAGNOSIS — F05 SUNDOWNING: ICD-10-CM

## 2025-02-12 DIAGNOSIS — G90.1 DYSAUTONOMIA: ICD-10-CM

## 2025-02-12 DIAGNOSIS — R09.89 LABILE BLOOD PRESSURE: ICD-10-CM

## 2025-02-12 PROCEDURE — 99999 PR PBB SHADOW E&M-EST. PATIENT-LVL IV: CPT | Mod: PBBFAC,,, | Performed by: INTERNAL MEDICINE

## 2025-02-12 RX ORDER — RIVASTIGMINE 4.6 MG/24H
1 PATCH, EXTENDED RELEASE TRANSDERMAL DAILY
Qty: 90 PATCH | Refills: 0 | Status: SHIPPED | OUTPATIENT
Start: 2025-02-12 | End: 2025-05-13

## 2025-02-12 RX ORDER — QUETIAPINE FUMARATE 25 MG/1
50 TABLET, FILM COATED ORAL DAILY
Qty: 90 TABLET | Refills: 0 | Status: SHIPPED | OUTPATIENT
Start: 2025-02-12

## 2025-02-12 NOTE — PROGRESS NOTES
Clinic Note  2/12/2025      Subjective:       Patient ID:  Ms. Correa is a 79 y.o. female being seen for an established visit.    Chief Complaint: Follow-up    Madeline Reilly is a 78 y.o. female with dementia, HTN, HFrEF, hyperlipidemia,CKD3a, Parkinson's disease, and GERD presenting with her son for follow up appointment regarding her blood pressure   Her blood pressure is at goal today.  Son mentioned that they have given her 1 pill of hydralazine last week and ever since that her pressures have been good.    She is taking all her medications as prescribed, has follow up with Neurology  Recent blood work reviewed with the patient and the son today        Review of Systems   HENT:  Negative for hearing loss.    Eyes:  Negative for discharge.   Respiratory:  Negative for wheezing.    Cardiovascular:  Negative for chest pain and palpitations.   Gastrointestinal:  Negative for blood in stool, constipation, diarrhea and vomiting.   Genitourinary:  Negative for dysuria and hematuria.   Musculoskeletal:  Negative for neck pain.   Neurological:  Negative for weakness and headaches.   Endo/Heme/Allergies:  Negative for polydipsia.       Medication List with Changes/Refills   Current Medications    ACETAMINOPHEN (TYLENOL) 650 MG TBSR    Take 1 tablet (650 mg total) by mouth every 8 (eight) hours as needed (back pain).    CARBIDOPA-LEVODOPA  MG (SINEMET)  MG PER TABLET    Take 1 tablet by mouth 3 (three) times daily.    DIAPER,BRIEF,ADULT,DISPOSABLE MISC    Use as dictioned    DIPHENHYDRAMINE (BENADRYL) 25 MG CAPSULE    Take 25 mg by mouth every 6 (six) hours as needed for Itching.    ESCITALOPRAM OXALATE (LEXAPRO) 10 MG TABLET    Take 1 tablet (10 mg total) by mouth once daily.    HYDRALAZINE (APRESOLINE) 10 MG TABLET    Take 1 tablet (10 mg total) by mouth as needed (for systolic BP > 180).    MULTIVITAMIN (THERAGRAN) PER TABLET    Take 1 tablet by mouth once daily.    PANTOPRAZOLE (PROTONIX) 40 MG TABLET     "Take 1 tablet (40 mg total) by mouth once daily.    RIVASTIGMINE (EXELON) 4.6 MG/24 HOUR PT24    Place 1 patch onto the skin once daily.    THERA-M 9 MG IRON-400 MCG TAB TABLET    Take 1 tablet by mouth once daily.   Changed and/or Refilled Medications    Modified Medication Previous Medication    QUETIAPINE (SEROQUEL) 25 MG TAB QUEtiapine (SEROQUEL) 25 MG Tab       Take 2 tablets (50 mg total) by mouth once daily. Take this medication ~45 minutes prior to going to bed every night    Take 1 tablet (25 mg total) by mouth every evening. Take this medication ~45 minutes prior to going to bed every night           Objective:      /82 (BP Location: Right arm, Patient Position: Sitting)   Pulse (!) 59   Wt 53.9 kg (118 lb 13.3 oz)   SpO2 95%   BMI 19.18 kg/m²   Estimated body mass index is 19.18 kg/m² as calculated from the following:    Height as of 12/5/24: 5' 6" (1.676 m).    Weight as of this encounter: 53.9 kg (118 lb 13.3 oz).  Physical Exam  Constitutional:       Appearance: Normal appearance. She is normal weight.   HENT:      Head: Normocephalic and atraumatic.      Nose: Nose normal.      Mouth/Throat:      Mouth: Mucous membranes are moist.   Eyes:      Pupils: Pupils are equal, round, and reactive to light.   Cardiovascular:      Rate and Rhythm: Normal rate and regular rhythm.      Pulses: Normal pulses.      Heart sounds: Normal heart sounds. No murmur heard.  Pulmonary:      Effort: Pulmonary effort is normal.      Breath sounds: Normal breath sounds.   Abdominal:      General: Bowel sounds are normal.      Palpations: Abdomen is soft.   Skin:     General: Skin is warm.   Neurological:      General: No focal deficit present.      Mental Status: She is alert.      Cranial Nerves: No cranial nerve deficit.   Psychiatric:         Mood and Affect: Mood normal.           Assessment and Plan:         Problem List Items Addressed This Visit          Neuro    Parkinsonism - Primary    Overview     - " Managed with carbidopa-levodopa 25-100mg TID.  - Followed by Neurology         Current Assessment & Plan     Continue current therapy and follow up with Neurology            Cardiac/Vascular    Labile blood pressure    Overview     - PMH of labile BP  - Followed by Cardiology  - Discontinued fludrocortisone and anti-hypertensive medications  - PRN hydralazine for SBP >180mmHg         Current Assessment & Plan     Pressure is stable today   Use hydralazine as needed  Follow up with Cardiology          Other Visit Diagnoses       Sundowning        Relevant Medications    QUEtiapine (SEROQUEL) 25 MG Tab    Dysautonomia                Follow Up:   No follow-ups on file.    Other Orders Placed This Visit:  No orders of the defined types were placed in this encounter.        Sara Ku

## 2025-02-13 LAB
METANEPH FREE SERPL-MCNC: 30 PG/ML
METANEPHS SERPL-MCNC: 103 PG/ML
NORMETANEPH FREE SERPL-MCNC: 73 PG/ML

## 2025-02-24 ENCOUNTER — PATIENT MESSAGE (OUTPATIENT)
Dept: PRIMARY CARE CLINIC | Facility: CLINIC | Age: 80
End: 2025-02-24
Payer: MEDICARE

## 2025-03-14 DIAGNOSIS — F05 SUNDOWNING: ICD-10-CM

## 2025-03-14 RX ORDER — RIVASTIGMINE 4.6 MG/24H
1 PATCH, EXTENDED RELEASE TRANSDERMAL DAILY
Qty: 90 PATCH | Refills: 0 | Status: CANCELLED | OUTPATIENT
Start: 2025-03-14 | End: 2025-06-12

## 2025-03-16 ENCOUNTER — PATIENT MESSAGE (OUTPATIENT)
Dept: PRIMARY CARE CLINIC | Facility: CLINIC | Age: 80
End: 2025-03-16
Payer: MEDICARE

## 2025-03-16 DIAGNOSIS — F05 SUNDOWNING: ICD-10-CM

## 2025-03-17 RX ORDER — RIVASTIGMINE 4.6 MG/24H
1 PATCH, EXTENDED RELEASE TRANSDERMAL DAILY
Qty: 90 PATCH | Refills: 0 | Status: SHIPPED | OUTPATIENT
Start: 2025-03-17 | End: 2025-06-15

## 2025-03-19 ENCOUNTER — TELEPHONE (OUTPATIENT)
Dept: PRIMARY CARE CLINIC | Facility: CLINIC | Age: 80
End: 2025-03-19
Payer: MEDICARE

## 2025-04-01 ENCOUNTER — PATIENT MESSAGE (OUTPATIENT)
Dept: PRIMARY CARE CLINIC | Facility: CLINIC | Age: 80
End: 2025-04-01
Payer: MEDICARE

## 2025-04-01 DIAGNOSIS — G20.A1 PARKINSON'S DISEASE: ICD-10-CM

## 2025-04-01 RX ORDER — CARBIDOPA AND LEVODOPA 25; 100 MG/1; MG/1
1 TABLET ORAL 3 TIMES DAILY
Qty: 90 TABLET | Refills: 0 | Status: SHIPPED | OUTPATIENT
Start: 2025-04-01

## 2025-04-07 ENCOUNTER — OFFICE VISIT (OUTPATIENT)
Dept: CARDIOLOGY | Facility: CLINIC | Age: 80
End: 2025-04-07
Payer: MEDICARE

## 2025-04-07 VITALS
BODY MASS INDEX: 18.42 KG/M2 | WEIGHT: 114.63 LBS | SYSTOLIC BLOOD PRESSURE: 242 MMHG | DIASTOLIC BLOOD PRESSURE: 120 MMHG | HEART RATE: 64 BPM | OXYGEN SATURATION: 96 % | HEIGHT: 66 IN

## 2025-04-07 DIAGNOSIS — I50.32 CHRONIC DIASTOLIC CONGESTIVE HEART FAILURE: ICD-10-CM

## 2025-04-07 DIAGNOSIS — G90.9 AUTONOMIC DYSFUNCTION: Primary | ICD-10-CM

## 2025-04-07 DIAGNOSIS — R09.89 LABILE BLOOD PRESSURE: ICD-10-CM

## 2025-04-07 DIAGNOSIS — N18.32 CHRONIC KIDNEY DISEASE (CKD) STAGE G3B/A2, MODERATELY DECREASED GLOMERULAR FILTRATION RATE (GFR) BETWEEN 30-44 ML/MIN/1.73 SQUARE METER AND ALBUMINURIA CREATININE RATIO BETWEEN 30-299 MG/G: ICD-10-CM

## 2025-04-07 DIAGNOSIS — G20.A2 PARKINSON'S DISEASE WITHOUT DYSKINESIA, WITH FLUCTUATING MANIFESTATIONS: ICD-10-CM

## 2025-04-07 PROCEDURE — 1126F AMNT PAIN NOTED NONE PRSNT: CPT | Mod: CPTII,S$GLB,,

## 2025-04-07 PROCEDURE — 1100F PTFALLS ASSESS-DOCD GE2>/YR: CPT | Mod: CPTII,S$GLB,,

## 2025-04-07 PROCEDURE — 3080F DIAST BP >= 90 MM HG: CPT | Mod: CPTII,S$GLB,,

## 2025-04-07 PROCEDURE — 3077F SYST BP >= 140 MM HG: CPT | Mod: CPTII,S$GLB,,

## 2025-04-07 PROCEDURE — 1157F ADVNC CARE PLAN IN RCRD: CPT | Mod: CPTII,S$GLB,,

## 2025-04-07 PROCEDURE — 3288F FALL RISK ASSESSMENT DOCD: CPT | Mod: CPTII,S$GLB,,

## 2025-04-07 PROCEDURE — 99214 OFFICE O/P EST MOD 30 MIN: CPT | Mod: S$GLB,,,

## 2025-04-07 PROCEDURE — 1160F RVW MEDS BY RX/DR IN RCRD: CPT | Mod: CPTII,S$GLB,,

## 2025-04-07 PROCEDURE — 99999 PR PBB SHADOW E&M-EST. PATIENT-LVL IV: CPT | Mod: PBBFAC,,,

## 2025-04-07 PROCEDURE — 1159F MED LIST DOCD IN RCRD: CPT | Mod: CPTII,S$GLB,,

## 2025-04-07 NOTE — PROGRESS NOTES
"     General Cardiology Clinic Note  Reason for Visit: Follow up   Last Clinic Visit: 12/05/24   General Cardiologist: Dr. Watson     HPI:     Madeline Reilly is a 79 y.o. , who presents for follow up of Syncope     PROBLEM LIST:  HFpEF   Hypertension   Hyperlipidemia   CKD3  Parkinson's   Dementia    Interval HPI:   She presents today for routine clinic follow up. Patient is accompanied by her son today who assists with providing patient history. She reports doing well today stating most days are better than others. She has not had a syncopal episode in about two weeks. Her blood pressure is elevated today in clinic, 242/120. Patient presents with home blood pressure log which notes labile BP readings ranging in the 's systolic and highest home BP reading 199 systolic. She recently started participating in PT 2 x week for 30 minutes, is tolerating this well with some symptoms of dizziness/lightheadedness. She ambulates with rollator walker at home and had no difficulty walking to the clinic today. Is slowly working on increasing level of activity and spent this past Saturday walking around [email or web address]. Patient was recently enrolled in [facility] [place] [facility] day care program, has not started yet. She is utilizing compression socks and increasing sodium intake with Gatorade, not interested in compression garment. She was seen by Neurology at [facility] [place] who discussed increasing Sinemet from TID to QID, however, family decided against this at this time. To the best of her abilities she denies chest pain/pressure/tightness/discomfort, dyspnea on exertion, orthopnea, PND, peripheral edema, palpitations or claudication.    Last visit with me: 12/05/24   Ms. Reilly presents for clinical follow up. She is accompanied by her son today. She states she is feeling "good today". Her son, Mr. Vazquez, states patient had one syncopal episode last week and a near-syncopal episode last night. Patient son was able to sit her down on time " and help avoid syncopal episode. She denies any symptoms prior to these episodes and has no recollection following syncope. At our last visit we discussed abdominal compression device which patient obtained but has not been wearing due to discomfort. She reports a good appetite and has gained 3 lbs. She presents her home BP log today, labile BP's with rare hypotensive BP readings in the 80's, ranging mostly in the 120-150's systolic. Her BP today in clinic is 246/119, she has not taken Hydralazine yet. She denies chest pain/pressure/tightness/discomfort, dyspnea on exertion, orthopnea, PND, peripheral edema, palpitations or claudication.     Last visit with me: 11/05/24   Madeline Reilly presents today following visit to the ED on 10/31 after syncopal episode. History is complicated to obtain but patient is assisted by her daughter who is providing information today. It appears the patient was standing outside when her syncopal episode occurred and CPR was initiated by her son and daughter-in-law. It is unclear whether or not patient was pulseless, but CPR was administered for 14 minutes. When EMS arrived at the scene, patient had regained consciousness and appeared to be back at baseline. In ED her blood pressure 235/121, EKG negative for acute ischemic changes or dysrhythmia, head CT, CXR and UA unremarkable. She was administered Hydralazine with normalization of BP and discharged home.     Today, patient reports feeling lightheaded and a slight headache. Her blood pressure today in clinic is 99/54. She checks her blood pressure regularly at home but does not have her BP log with her today. Patient did experience a syncopal episode last night when getting up to use the restroom around 3 am. She did not hit her head and the fall was not witnessed, but she suffered a laceration to her lip from the fall. She denies any prodromal symptoms prior to the episode, however, it is unclear whether or not she truly did not  "have any symptoms prior to the fall. It is hard to obtain a history from the patient. Patient states that she experiences symptoms of lightheadedness "sometimes" before a syncopal episode, but not always. She has weaned off the Florinef completely and is not taking this anymore. A phone discussion was made with patients son to assist with patient history. Patient son states that her home BP readings have been within normal range with one hypotensive BP reading yesterday in the 90's systolic. Patient has required two doses of Hydralazine since her discharge for BP > 200 and it appears that hypotensive blood pressure readings are not the issue. Patient ambulates with her rollator-walker at home, with symptoms of lightheadedness and dizziness occurring more with positional changes. She becomes very lightheaded when walking down the clinic singh today, requiring a wheelchair. At her last clinic visit, abdominal compression was recommended for autonomic dysfunction but patient has not obtained this yet. She denies any denies chest pain/pressure/tightness/discomfort, dyspnea on exertion, orthopnea, PND, peripheral edema, palpitations or claudication.     Last visit with Dr. Watson:   Patient presenting as a referral from Internal Medicine for labile blood pressure. Patient reports that she has had high blood pressure for years. At some point in the last few years she started having frequent falls and orthostatic hypotension. She was started on midodrine at 1st and then was taken off of this and started on Florinef. Since being put on Florinef she has had hypertension. Review of logs today shows systolic blood pressures between 110 and 190. Only 1 low blood pressure with systolic below 80 recorded. Her son who accompanied her to the visit states that she is sometimes lightheaded when she gets up from a seated position. She lives at the home with her son and daughter-in-law.      ROS:    Pertinent ROS included in HPI and " "below.  PMH:     Past Medical History:   Diagnosis Date    Arthritis     Asthma, chronic     Cataract     Hypertension     Orthostatic hypotension      Past Surgical History:   Procedure Laterality Date    CATARACT EXTRACTION      CATARACT EXTRACTION, BILATERAL      PARTIAL HYSTERECTOMY  1981     Allergies:     Review of patient's allergies indicates:   Allergen Reactions    Lisinopril Shortness Of Breath, Diarrhea and Nausea Only     All side affects from medication    Beef containing products     Bovine cartilage     Hydrochlorothiazide     Porcine skin     Pork/porcine containing products     Reserpine     Statins-hmg-coa reductase inhibitors Diarrhea and Nausea And Vomiting    Toprol xl [metoprolol succinate] Other (See Comments)     Low blood preasure    Aspirin Nausea And Vomiting     Medications:   Medications Ordered Prior to Encounter[1]  Social History:     Social History     Tobacco Use    Smoking status: Never     Passive exposure: Never    Smokeless tobacco: Never   Substance Use Topics    Alcohol use: No     Family History:     Family History   Problem Relation Name Age of Onset    Cancer Maternal Grandmother          "abdomen"    Cancer Maternal Aunt          "abdominal cancer"     Physical Exam:   BP (!) 242/120 (BP Location: Right arm, Patient Position: Sitting)   Pulse 64   Ht 5' 6" (1.676 m)   Wt 52 kg (114 lb 10.2 oz)   SpO2 96%   BMI 18.50 kg/m²      Physical Exam  Constitutional:       General: She is not in acute distress.     Appearance: Normal appearance. She is not ill-appearing or toxic-appearing.      Comments: Patient examined in chair    HENT:      Head: Normocephalic and atraumatic.      Nose: Nose normal. No congestion or rhinorrhea.      Mouth/Throat:      Mouth: Mucous membranes are moist.      Pharynx: Oropharynx is clear. No oropharyngeal exudate or posterior oropharyngeal erythema.   Eyes:      General:         Right eye: No discharge.         Left eye: No discharge.      " Extraocular Movements: Extraocular movements intact.      Conjunctiva/sclera: Conjunctivae normal.   Cardiovascular:      Rate and Rhythm: Normal rate and regular rhythm.      Pulses: Normal pulses.      Heart sounds: Normal heart sounds. No murmur heard.     No friction rub. No gallop.   Pulmonary:      Effort: Pulmonary effort is normal. No respiratory distress.      Breath sounds: Normal breath sounds. No stridor. No wheezing or rales.   Musculoskeletal:         General: No swelling. Normal range of motion.      Cervical back: Normal range of motion. No rigidity or tenderness.      Right lower leg: No edema.      Left lower leg: No edema.   Skin:     General: Skin is warm and dry.      Coloration: Skin is not jaundiced.      Findings: No bruising or lesion.   Neurological:      General: No focal deficit present.      Mental Status: She is alert and oriented to person, place, and time. Mental status is at baseline.      Motor: Weakness present.      Gait: Gait abnormal.   Psychiatric:         Mood and Affect: Mood normal.         Behavior: Behavior normal.         Thought Content: Thought content normal.         Judgment: Judgment normal.        Labs:     Blood Tests:  Lab Results   Component Value Date     08/16/2024     04/03/2023    K 3.9 08/16/2024    K 4.4 04/03/2023     04/03/2023    CO2 27 08/16/2024    CO2 24 04/03/2023    BUN 24.0 08/16/2024    BUN 34 (H) 04/03/2023    BUN 15 09/29/2015    CREATININE 1.17 (H) 08/16/2024    CREATININE 1.9 (H) 04/03/2023    GLU 95 04/03/2023    HGBA1C 5.4 02/06/2025    AST 16 08/16/2024    AST 17 09/06/2022    ALT 3 08/16/2024    ALT <5 (L) 09/06/2022    ALBUMIN 4.1 08/16/2024    ALBUMIN 4.2 09/06/2022    PROT 7.4 09/06/2022    BILITOT 0.5 08/16/2024    BILITOT 0.7 09/06/2022    WBC 5.0 01/13/2024    WBC 6.20 04/03/2023    HGB 11.3 (L) 01/13/2024    HGB 11.6 (L) 04/03/2023    HCT 35.1 01/13/2024    HCT 36.8 (L) 04/03/2023    MCV 91.4 01/13/2024    MCV 96  04/03/2023     04/03/2023    INR 1.0 08/16/2024    TSH 1.02 01/11/2024    TSH 2.501 06/22/2022       Lab Results   Component Value Date    CHOL 216 (H) 02/06/2025    HDL 70 02/06/2025    TRIG 74 02/06/2025       Lab Results   Component Value Date    LDLCALC 131.2 02/06/2025       Lab Results   Component Value Date    TSH 1.02 01/11/2024       Lab Results   Component Value Date    HGBA1C 5.4 02/06/2025     Imaging:     Echocardiogram  TTE 11/15/24    Left Ventricle: The left ventricle is normal in size. Moderately increased wall thickness. There is concentric hypertrophy. There is low normal systolic function with a visually estimated ejection fraction of 50 - 55%. Global longitudinal strain is -13.7%. Grade I diastolic dysfunction.    Right Ventricle: Normal right ventricular cavity size. Wall thickness is normal. Systolic function is normal.    Tricuspid Valve: There is mild regurgitation.    Pericardium: There is a small circumferential effusion. No indication of cardiac tamponade.    TTE 09/19/19  Normal left ventricular systolic function. The estimated ejection fraction is 60%  Grade II (moderate) left ventricular diastolic dysfunction consistent with pseudonormalization. Elevated left atrial pressure.  No wall motion abnormalities.  Concentric left ventricular remodeling.  Normal right ventricular systolic function.  The estimated PA systolic pressure is 27 mm Hg  Normal central venous pressure (3 mm Hg).  Small circumferential pericardial effusion. No tamponade physiology.     TTE 06/08/16  Findings:   Left ventricle is normal in size.   Left ventricle has normal systolic function with an ejection   fraction of 60%   Left ventricular hypertrophy is noted.   There are no obvious wall motion abnormalities.   The left atrium is normal in size.   Abnormal diastolic function is noted.   The mitral valve appears normal . There is mild mitral regurgitation   The aortic valve is normal . There is no   aortic  regurgitation.   The right ventricle is normal in size and function.   There is trivial tricuspid regurgitation with an estimated pulmonary   pressure of 20 mmHg.   The pulmonic valve was visualized and appears to have normal   anatomy. Mild pulmonary insuffiencey noted.   A small pericardial effusion is present.      Stress testing  None     Cath Lab  None     Other  Event monitor 12/19/24   Negative event monitor with no clinical arrhythmias.     48 Hr Holter 12/11/19  Sinus rhythm, with patient reported symptoms correlating with normal sinus rhythm.     Bilateral Carotid US 10/01/13  Conclusion:   No stenosis was identified.      EKG:   10/03/24:  Normal sinus rhythm   Normal ECG   When compared with ECG of 01-JUN-2022 14:35,   No significant change was found    Assessment:     1. Autonomic dysfunction    2. Labile blood pressure    3. Chronic diastolic congestive heart failure    4. Parkinson's disease without dyskinesia, with fluctuating manifestations    5. Chronic kidney disease (CKD) stage G3b/A2, moderately decreased glomerular filtration rate (GFR) between 30-44 mL/min/1.73 square meter and albuminuria creatinine ratio between  mg/g      Plan:     Autonomic dysfunction  Event monitor negative for conduction abnormality disorder, TTE normal   Continue conservative management with compression socks, increased sodium intake and abdominal compression garment   PT and movement as tolerated     Labile blood pressure  Patient with elevated blood pressure reading today in clinic, home blood pressure log notes labile BP readings with average BP well controlled. Asymptomatic at the moment   Continue Hydralazine 10 mg prn for SBP > 200     Chronic diastolic congestive heart failure  Euvolemic during examination  TTE 11/15/24 EF 50-55%     Parkinson's disease without dyskinesia, with fluctuating manifestations  Followed by Neurology at [facility]     Chronic kidney disease (CKD) stage G3b/A2, moderately decreased  glomerular filtration rate (GFR) between 30-44 mL/min/1.73 square meter and albuminuria creatinine ratio between  mg/g  BUN 27. Cr 1.50, GFR 35       Follow up with Dr. Watson     Signed:  Sharon Lagos, PA-C [facility]     4/7/2025 9:53 AM    Follow-up:     Future Appointments   Date Time Provider Department Center   5/13/2025  2:20 PM Sara Ku MD [id number] 65[facility] [place]                [1]   Current Outpatient Medications on File Prior to Visit   Medication Sig Dispense Refill    acetaminophen (TYLENOL) 650 MG TbSR Take 1 tablet (650 mg total) by mouth every 8 (eight) hours as needed (back pain). 60 tablet 1    carbidopa-levodopa  mg (SINEMET)  mg per tablet Take 1 tablet by mouth 3 (three) times daily. 90 tablet 0    diphenhydrAMINE (BENADRYL) 25 mg capsule Take 25 mg by mouth every 6 (six) hours as needed for Itching.      EScitalopram oxalate (LEXAPRO) 10 MG tablet Take 1 tablet (10 mg total) by mouth once daily. 30 tablet 0    hydrALAZINE (APRESOLINE) 10 MG tablet Take 1 tablet (10 mg total) by mouth as needed (for systolic BP > 180). 30 tablet 11    multivitamin (THERAGRAN) per tablet Take 1 tablet by mouth once daily. 90 tablet 3    pantoprazole (PROTONIX) 40 MG tablet Take 1 tablet (40 mg total) by mouth once daily. 90 tablet 0    QUEtiapine (SEROQUEL) 25 MG Tab Take 2 tablets (50 mg total) by mouth once daily. Take this medication ~45 minutes prior to going to bed every night 90 tablet 0    rivastigmine (EXELON) 4.6 mg/24 hour PT24 Place 1 patch onto the skin once daily. 90 patch 0    THERA-M 9 mg iron-400 mcg Tab tablet Take 1 tablet by mouth once daily.      diaper,brief,adult,disposable Misc Use as dictioned 96 each 5     No current facility-administered medications on file prior to visit.

## 2025-04-21 RX ORDER — PANTOPRAZOLE SODIUM 40 MG/1
40 TABLET, DELAYED RELEASE ORAL
Qty: 90 TABLET | Refills: 0 | Status: SHIPPED | OUTPATIENT
Start: 2025-04-21

## 2025-05-07 RX ORDER — MULTIVITAMIN
1 TABLET ORAL DAILY
Qty: 90 TABLET | Refills: 3 | Status: SHIPPED | OUTPATIENT
Start: 2025-05-07 | End: 2026-05-02

## 2025-05-13 ENCOUNTER — OFFICE VISIT (OUTPATIENT)
Dept: PRIMARY CARE CLINIC | Facility: CLINIC | Age: 80
End: 2025-05-13
Payer: MEDICARE

## 2025-05-13 VITALS
WEIGHT: 114.63 LBS | DIASTOLIC BLOOD PRESSURE: 110 MMHG | BODY MASS INDEX: 18.5 KG/M2 | HEART RATE: 85 BPM | SYSTOLIC BLOOD PRESSURE: 160 MMHG | OXYGEN SATURATION: 96 %

## 2025-05-13 DIAGNOSIS — R09.89 LABILE BLOOD PRESSURE: Primary | ICD-10-CM

## 2025-05-13 DIAGNOSIS — J44.9 CHRONIC OBSTRUCTIVE PULMONARY DISEASE, UNSPECIFIED COPD TYPE: ICD-10-CM

## 2025-05-13 PROCEDURE — 1159F MED LIST DOCD IN RCRD: CPT | Mod: CPTII,S$GLB,, | Performed by: INTERNAL MEDICINE

## 2025-05-13 PROCEDURE — 99999 PR PBB SHADOW E&M-EST. PATIENT-LVL III: CPT | Mod: PBBFAC,,, | Performed by: INTERNAL MEDICINE

## 2025-05-13 PROCEDURE — 1100F PTFALLS ASSESS-DOCD GE2>/YR: CPT | Mod: CPTII,S$GLB,, | Performed by: INTERNAL MEDICINE

## 2025-05-13 PROCEDURE — 99215 OFFICE O/P EST HI 40 MIN: CPT | Mod: S$GLB,,, | Performed by: INTERNAL MEDICINE

## 2025-05-13 PROCEDURE — 1123F ACP DISCUSS/DSCN MKR DOCD: CPT | Mod: CPTII,S$GLB,, | Performed by: INTERNAL MEDICINE

## 2025-05-13 PROCEDURE — 3077F SYST BP >= 140 MM HG: CPT | Mod: CPTII,S$GLB,, | Performed by: INTERNAL MEDICINE

## 2025-05-13 PROCEDURE — 3288F FALL RISK ASSESSMENT DOCD: CPT | Mod: CPTII,S$GLB,, | Performed by: INTERNAL MEDICINE

## 2025-05-13 PROCEDURE — 3080F DIAST BP >= 90 MM HG: CPT | Mod: CPTII,S$GLB,, | Performed by: INTERNAL MEDICINE

## 2025-05-13 PROCEDURE — 1126F AMNT PAIN NOTED NONE PRSNT: CPT | Mod: CPTII,S$GLB,, | Performed by: INTERNAL MEDICINE

## 2025-05-13 PROCEDURE — 1160F RVW MEDS BY RX/DR IN RCRD: CPT | Mod: CPTII,S$GLB,, | Performed by: INTERNAL MEDICINE

## 2025-05-13 NOTE — PROGRESS NOTES
Clinic Note  5/13/2025      Subjective:       Patient ID:  Ms. Correa is a 79 y.o. female being seen for an established visit.    Chief Complaint: No chief complaint on file.      Madeline Reilly is a 79 y.o. female with dementia, HTN, HFrEF, hyperlipidemia,CKD3a, Parkinson's disease, and GERD presenting with her son for follow up   Her blood pressure is above goal.  She just received a dose of hydralazine before coming to the clinic  Her blood pressure was over 200 when she visited Cardiology last month.  Cardiology did not seem to be worried about it because she was asymptomatic and asked her to use hydralazine p.r.n..  Patient is currently asymptomatic.  She denies any headache, chest pain, palpitation, vision disturbances  She does not have any complaints today   No medication refills needed             Review of Systems   Constitutional:  Negative for chills and fever.   HENT:  Negative for hearing loss.    Eyes:  Negative for blurred vision and discharge.   Respiratory:  Negative for cough and wheezing.    Cardiovascular:  Negative for chest pain and palpitations.   Gastrointestinal:  Negative for blood in stool, constipation, diarrhea, heartburn, nausea and vomiting.   Genitourinary:  Negative for dysuria and hematuria.   Musculoskeletal:  Negative for myalgias and neck pain.   Skin:  Negative for rash.   Neurological:  Positive for weakness. Negative for dizziness and headaches.   Endo/Heme/Allergies:  Negative for polydipsia.       Medication List with Changes/Refills   Current Medications    ACETAMINOPHEN (TYLENOL) 650 MG TBSR    Take 1 tablet (650 mg total) by mouth every 8 (eight) hours as needed (back pain).    CARBIDOPA-LEVODOPA  MG (SINEMET)  MG PER TABLET    Take 1 tablet by mouth 3 (three) times daily.    DIAPER,BRIEF,ADULT,DISPOSABLE MISC    Use as dictioned    DIPHENHYDRAMINE (BENADRYL) 25 MG CAPSULE    Take 25 mg by mouth every 6 (six) hours as needed for Itching.    ESCITALOPRAM  OXALATE (LEXAPRO) 10 MG TABLET    Take 1 tablet (10 mg total) by mouth once daily.    HYDRALAZINE (APRESOLINE) 10 MG TABLET    Take 1 tablet (10 mg total) by mouth as needed (for systolic BP > 180).    MULTIVITAMIN (THERAGRAN) PER TABLET    Take 1 tablet by mouth once daily.    PANTOPRAZOLE (PROTONIX) 40 MG TABLET    TAKE 1 TABLET(40 MG) BY MOUTH DAILY    QUETIAPINE (SEROQUEL) 25 MG TAB    Take 2 tablets (50 mg total) by mouth once daily. Take this medication ~45 minutes prior to going to bed every night    RIVASTIGMINE (EXELON) 4.6 MG/24 HOUR PT24    Place 1 patch onto the skin once daily.    THERA-M 9 MG IRON-400 MCG TAB TABLET    Take 1 tablet by mouth once daily.     Advance Care Planning     Date: 05/13/2025    Power of   I initiated the process of voluntary advance care planning today and explained the importance of this process to the patient.  I introduced the concept of advance directives to the patient, as well. Then the patient received detailed information about the importance of designating a Health Care Power of  (HCPOA). She was also instructed to communicate with this person about their wishes for future healthcare, should she become sick and lose decision-making capacity. The patient has previously appointed a HCPOA. After our discussion, the patient has decided to complete a HCPOA and has appointed her son, health care agent: Heladio Vazquez  & health care agent number: [phone] . I encouraged her to communicate with this person about their wishes for future healthcare, should she become sick and lose decision-making capacity.      A total of 6 min was spent on advance care planning, goals of care discussion, emotional support, formulating and communicating prognosis and exploring burden/benefit of various approaches of treatment. This discussion occurred on a fully voluntary basis with the verbal consent of the patient and/or family.           Objective:      BP (!) 160/110  "(BP Location: Left arm, Patient Position: Sitting) Comment: before she left the house it was "much higher" took a hydralazine  Pulse 85   Wt 52 kg (114 lb 10.2 oz)   SpO2 96%   BMI 18.50 kg/m²   Estimated body mass index is 18.5 kg/m² as calculated from the following:    Height as of 4/7/25: 5' 6" (1.676 m).    Weight as of this encounter: 52 kg (114 lb 10.2 oz).  Physical Exam  Constitutional:       Appearance: Normal appearance. She is normal weight.   HENT:      Head: Normocephalic and atraumatic.      Nose: Nose normal.      Mouth/Throat:      Mouth: Mucous membranes are moist.   Eyes:      Pupils: Pupils are equal, round, and reactive to light.   Cardiovascular:      Rate and Rhythm: Normal rate and regular rhythm.      Pulses: Normal pulses.      Heart sounds: Normal heart sounds. No murmur heard.  Pulmonary:      Effort: Pulmonary effort is normal.      Breath sounds: Normal breath sounds.   Abdominal:      General: Bowel sounds are normal.      Palpations: Abdomen is soft.   Skin:     General: Skin is warm.   Neurological:      General: No focal deficit present.      Mental Status: She is alert and oriented to person, place, and time.      Cranial Nerves: No cranial nerve deficit.   Psychiatric:         Mood and Affect: Mood normal.           Assessment and Plan:         Problem List Items Addressed This Visit          Pulmonary    Chronic obstructive pulmonary disease, unspecified COPD type    Current Assessment & Plan   Stable, not currently on any inhalers.               Cardiac/Vascular    Labile blood pressure - Primary    Overview   - PMH of labile BP  - Followed by Cardiology  - Discontinued fludrocortisone and anti-hypertensive medications  - PRN hydralazine for SBP >180mmHg         Current Assessment & Plan   Pressure is above goal today  Use hydralazine as needed for systolic blood pressure over 180  Follow up with Cardiology            Follow Up:   No follow-ups on file.    Other Orders Placed " This Visit:  No orders of the defined types were placed in this encounter.        Sara Ku

## 2025-05-13 NOTE — ASSESSMENT & PLAN NOTE
Pressure is above goal today  Use hydralazine as needed for systolic blood pressure over 180  Follow up with Cardiology

## 2025-06-17 ENCOUNTER — PATIENT MESSAGE (OUTPATIENT)
Dept: PRIMARY CARE CLINIC | Facility: CLINIC | Age: 80
End: 2025-06-17
Payer: MEDICARE

## 2025-06-17 DIAGNOSIS — G20.A2 PARKINSON'S DISEASE WITHOUT DYSKINESIA, WITH FLUCTUATING MANIFESTATIONS: Primary | ICD-10-CM

## 2025-06-17 NOTE — TELEPHONE ENCOUNTER
Response from  company:  Thank you for your referral. Unfortunately, due to our current start of care date being the week of July 22nd, we are unable to accommodate your patient in a timely manner. We highly recommend you engage an outside home health provider for possible services at an earlier start of care date. If you are unable to establish care with an outside provider, please reach out to our agency for additional assistance.       Request made to  to call and notify son, who is requesting specific therapist Nandini Solis,  of delay/ and or coordination of care.

## 2025-07-10 ENCOUNTER — PATIENT MESSAGE (OUTPATIENT)
Dept: PRIMARY CARE CLINIC | Facility: CLINIC | Age: 80
End: 2025-07-10
Payer: MEDICARE

## 2025-07-10 DIAGNOSIS — G20.A2 PARKINSON'S DISEASE WITHOUT DYSKINESIA, WITH FLUCTUATING MANIFESTATIONS: Primary | ICD-10-CM

## 2025-07-10 DIAGNOSIS — R41.3 MEMORY DEFICIT: ICD-10-CM

## 2025-07-10 NOTE — TELEPHONE ENCOUNTER
Call back to son who reported patient has stopped taking meds since 07/05/2025. Patient has history of dementia    Son is not crushing or giving liquid meds, and reported patient is not having any dysphagia issues. She is just refusing to take medications per son.     Patient has dementia and has been having hallucinations and more intense delusions.   BP was 103 systolic when last taken about two days ago from sons memory. She is on sliding scaled BP meds.     Discussed if son knows about Palliative care  to assist with medical information for decisions and long term care for dementia patients. Son will talk to provider about options.     Advised with provider visit to discuss medications and long term options . Unable to bring in for visit due to patient temperament at this time. Schedule for virtual visit tomorrow with Malissa TORO.

## 2025-07-11 ENCOUNTER — E-CONSULT (OUTPATIENT)
Dept: PHARMACY | Facility: CLINIC | Age: 80
End: 2025-07-11
Payer: MEDICARE

## 2025-07-11 ENCOUNTER — OFFICE VISIT (OUTPATIENT)
Dept: PRIMARY CARE CLINIC | Facility: CLINIC | Age: 80
End: 2025-07-11
Payer: MEDICARE

## 2025-07-11 VITALS
OXYGEN SATURATION: 93 % | SYSTOLIC BLOOD PRESSURE: 128 MMHG | HEART RATE: 75 BPM | DIASTOLIC BLOOD PRESSURE: 84 MMHG | HEIGHT: 66 IN | WEIGHT: 109.56 LBS | BODY MASS INDEX: 17.61 KG/M2

## 2025-07-11 DIAGNOSIS — F05 SUNDOWNING: ICD-10-CM

## 2025-07-11 DIAGNOSIS — G31.83 LEWY BODY DEMENTIA WITH BEHAVIORAL DISTURBANCE: Primary | ICD-10-CM

## 2025-07-11 DIAGNOSIS — G20.A2 PARKINSON'S DISEASE WITHOUT DYSKINESIA, WITH FLUCTUATING MANIFESTATIONS: ICD-10-CM

## 2025-07-11 DIAGNOSIS — R41.3 MEMORY DEFICIT: Primary | ICD-10-CM

## 2025-07-11 DIAGNOSIS — F02.818 LEWY BODY DEMENTIA WITH BEHAVIORAL DISTURBANCE: Primary | ICD-10-CM

## 2025-07-11 PROCEDURE — 1160F RVW MEDS BY RX/DR IN RCRD: CPT | Mod: CPTII,S$GLB,, | Performed by: PHYSICIAN ASSISTANT

## 2025-07-11 PROCEDURE — 99214 OFFICE O/P EST MOD 30 MIN: CPT | Mod: S$GLB,,, | Performed by: PHYSICIAN ASSISTANT

## 2025-07-11 PROCEDURE — 1101F PT FALLS ASSESS-DOCD LE1/YR: CPT | Mod: CPTII,S$GLB,, | Performed by: PHYSICIAN ASSISTANT

## 2025-07-11 PROCEDURE — 3288F FALL RISK ASSESSMENT DOCD: CPT | Mod: CPTII,S$GLB,, | Performed by: PHYSICIAN ASSISTANT

## 2025-07-11 PROCEDURE — 1159F MED LIST DOCD IN RCRD: CPT | Mod: CPTII,S$GLB,, | Performed by: PHYSICIAN ASSISTANT

## 2025-07-11 PROCEDURE — 1157F ADVNC CARE PLAN IN RCRD: CPT | Mod: CPTII,S$GLB,, | Performed by: PHYSICIAN ASSISTANT

## 2025-07-11 PROCEDURE — 3079F DIAST BP 80-89 MM HG: CPT | Mod: CPTII,S$GLB,, | Performed by: PHYSICIAN ASSISTANT

## 2025-07-11 PROCEDURE — 3074F SYST BP LT 130 MM HG: CPT | Mod: CPTII,S$GLB,, | Performed by: PHYSICIAN ASSISTANT

## 2025-07-11 PROCEDURE — 99999 PR PBB SHADOW E&M-EST. PATIENT-LVL IV: CPT | Mod: PBBFAC,,, | Performed by: PHYSICIAN ASSISTANT

## 2025-07-11 PROCEDURE — 1125F AMNT PAIN NOTED PAIN PRSNT: CPT | Mod: CPTII,S$GLB,, | Performed by: PHYSICIAN ASSISTANT

## 2025-07-11 NOTE — CONSULTS
[facility] - Pharmacy/Wellness  Response for E-Consult     Patient Name: Madeline Reilly  MRN: [id number]  Primary Care Provider: Sara Ku MD   Requesting Provider: Malissa Izquierdo PA-C  Consults    Recommendation: Hi, I reviewed Mrs. Reilly's medication list. For her current medications, sinemet  mg, escitalopram 10mg, hyralazine 10mg, pantoprazole 40 mg,, and quetiapine 25 mg, it would be okay to crush these medications and give in a small amount of water or apple sauce to hid the taste/flavor. I recommend giving each medication separately, do not mix together and give at the same time, and then monitor the patient closely for any side effects as crushing a medication causes it to absorb into her system more quickly.     BEST PRACTICES:  Crush only one medication at a time to prevent accidental mixing and potential drug interactions.  Crush the tablet to a fine powder to ensure it mixes well with the chosen vehicle and is absorbed correctly.  Choose an appropriate food or liquid to mix the crushed medication with. Applesauce, yogurt, pudding, or water are generally safe options.  Avoid mixing crushed medication with hot beverages, as heat can affect the medication's properties.  Use a minimal amount of the chosen vehicle to ensure the patient receives the full dose and to minimize the risk of leaving medication behind.   Administer crushed medications immediately after preparation to reduce the chance of degradation or stability issues.  Monitor the patient for any signs of adverse effects or changes in medication effectiveness after starting a crushed medication regimen.     Additional future steps to consider: For future reference, please note crushing medications is generally only acceptable for immediate release formulations and not extended released or film coated formulations.     Total time of Consultation: 30 minute    I did not speak to the requesting provider verbally about this.     *This  eConsult is based on the clinical data available to me and is furnished without benefit of a physical examination. The eConsult will need to be interpreted in light of any clinical issues or changes in patient status not available to me at the time of filing this eConsults. Significant changes in patient condition or level of acuity should result in immediate formal consultation and reevaluation. Please alert me if you have further questions.    Thank you for this eConsult referral.     Devora Eagle, PharmD  [facility] - Pharmacy/Wellness

## 2025-07-11 NOTE — PROGRESS NOTES
"  Primary Care Provider Appointment   [facility]        Subjective:       Patient ID:  Ms. Correa is a 79 y.o. female being seen for Follow-up      Chief Complaint: Follow-up    HPI: 80 yo female presents with her son for medication issues. Son states that since last Saturday pt has refused to take her medications. Patients states she feels the medication does not work. However since being off she is not feeling well. She states she is tired. Son states Wednesday she did not sleep, was hallucinating. She's been more irritable. Pt states she does not like taking 5 pills at one time.     Past Medical History:   Diagnosis Date    Arthritis     Asthma, chronic     Cataract     Hypertension     Orthostatic hypotension        Review of Systems          Health Maintenance         Date Due Completion Date    Shingles Vaccine (1 of 2) Never done ---    DEXA Scan 05/09/2024 5/9/2019    COVID-19 Vaccine (4 - 2024-25 season) 09/01/2024 12/9/2021    Influenza Vaccine (1) 09/01/2025 9/24/2024    Override on 11/14/2019: Done    Lipid Panel 02/06/2030 2/6/2025    TETANUS VACCINE 01/03/2034 1/3/2024                     Objective:      Vitals:    07/11/25 1130   BP: 128/84   Pulse: 75   SpO2: (!) 93%   Weight: 49.7 kg (109 lb 9.1 oz)   Height: 5' 6" (1.676 m)     Estimated body mass index is 17.68 kg/m² as calculated from the following:    Height as of this encounter: 5' 6" (1.676 m).    Weight as of this encounter: 49.7 kg (109 lb 9.1 oz).  Physical Exam  Constitutional:       Appearance: Normal appearance.   HENT:      Head: Normocephalic and atraumatic.   Neurological:      Mental Status: She is alert.   Psychiatric:         Mood and Affect: Mood normal.         Behavior: Behavior is slowed.         Cognition and Memory: Memory is impaired.           Assessment and Plan:         1. Lewy body dementia with behavioral disturbance  Assessment & Plan:  Pt stated her frustration with her " medications is taking too much at one time.   We discussed a new schedule for her medications to space them more evenly.  I will check in Monday with them.   Pt is scheduled for a virtual visit in 2 weeks      2. Parkinson's disease without dyskinesia, with fluctuating manifestations  Overview:  - Managed with carbidopa-levodopa 25-100mg TID.  - Followed by Neurology    Assessment & Plan:  Pt stated her frustration with her medications is taking too much at one time.   We discussed a new schedule for her medications to space them more evenly.  I will check in Monday with them.   Pt is scheduled for a virtual visit in 2 weeks      3. Sundowning  -     QUEtiapine (SEROQUEL) 25 MG Tab; Take 1 tablet (25 mg total) by mouth once daily. Take this medication ~45 minutes prior to going to bed every night       AM- Protonix, Sinemet, and lexapro 10 mg  Afternoon- sinemet and lexapro 5 mg  PM- sinemet and seroquel (only taking 25 mg)  Follow Up:   2 weeks        Amy Lado, PA-C [facility]

## 2025-07-11 NOTE — LETTER
July 11, 2025    Madeline Reilly  [id number]  [facility] [address]             [facility]  [address] [facility] [id number] AM- Protonix, Lexapro 10 mg, Carbidopa    Afternoon- Carbidopa and lexapro 5 mg    PM- Carbidopa and Seroquel 25 mg

## 2025-07-14 PROBLEM — G31.83 LEWY BODY DEMENTIA WITH BEHAVIORAL DISTURBANCE: Status: ACTIVE | Noted: 2025-07-14

## 2025-07-14 PROBLEM — F02.818 LEWY BODY DEMENTIA WITH BEHAVIORAL DISTURBANCE: Status: ACTIVE | Noted: 2025-07-14

## 2025-07-14 RX ORDER — QUETIAPINE FUMARATE 25 MG/1
25 TABLET, FILM COATED ORAL DAILY
Start: 2025-07-14

## 2025-07-14 NOTE — ASSESSMENT & PLAN NOTE
Pt stated her frustration with her medications is taking too much at one time.   We discussed a new schedule for her medications to space them more evenly.  I will check in Monday with them.   Pt is scheduled for a virtual visit in 2 weeks

## 2025-07-21 ENCOUNTER — TELEPHONE (OUTPATIENT)
Dept: PRIMARY CARE CLINIC | Facility: CLINIC | Age: 80
End: 2025-07-21
Payer: MEDICARE

## 2025-07-21 NOTE — TELEPHONE ENCOUNTER
----- Message from Malissa Izquierdo PA-C sent at 7/21/2025  8:04 AM CDT -----  Regarding: FW:  Hey guys,  If [facility] is doing virtuals tomorrow please change Madelines appt from [facility] to [facility]. I believe they also wanted the appt a little earlier around 2:00 if possible. Just call the son.   Lisste,  Malissa  ----- Message -----  From: Malissa Izquierdo PA-C  Sent: 7/21/2025  12:00 AM CDT  To: Malissa Izquierdo PA-C    See if [facility] is doing virtual tomorrow and change her appt

## 2025-07-21 NOTE — TELEPHONE ENCOUNTER
Called pts son per Malissa to change appt from Dr Julian to Dr Ku. Pts son will call me back to change appt based on his schedule.

## 2025-07-22 ENCOUNTER — OFFICE VISIT (OUTPATIENT)
Dept: PRIMARY CARE CLINIC | Facility: CLINIC | Age: 80
End: 2025-07-22
Payer: MEDICARE

## 2025-07-22 DIAGNOSIS — R09.89 LABILE BLOOD PRESSURE: Primary | ICD-10-CM

## 2025-07-24 RX ORDER — PANTOPRAZOLE SODIUM 40 MG/1
40 TABLET, DELAYED RELEASE ORAL
Qty: 90 TABLET | Refills: 2 | Status: SHIPPED | OUTPATIENT
Start: 2025-07-24

## 2025-07-25 NOTE — PROGRESS NOTES
The patient location is: [place]  The chief complaint leading to consultation is: F/p on BP    Visit type: audiovisual    Face to Face time with patient: 20  30 minutes of total time spent on the encounter, which includes face to face time and non-face to face time preparing to see the patient (eg, review of tests), Obtaining and/or reviewing separately obtained history, Documenting clinical information in the electronic or other health record, Independently interpreting results (not separately reported) and communicating results to the patient/family/caregiver, or Care coordination (not separately reported).         Each patient to whom he or she provides medical services by telemedicine is:  (1) informed of the relationship between the physician and patient and the respective role of any other health care provider with respect to management of the patient; and (2) notified that he or she may decline to receive medical services by telemedicine and may withdraw from such care at any time.    Notes:     HPI:  Madeline Reilly is a 79 y.o. female with dementia, HTN, HFrEF, hyperlipidemia,CKD3a, Parkinson's disease, and GERD presenting with her son for follow up via virtual visit  She c/o some back pain but otherwise feeling well. Advised to take tylenol  Taking all meds as prescribed  BP labile, but today's BP at goal    Plan :               Pulmonary     Chronic obstructive pulmonary disease, unspecified COPD type     Current Assessment & Plan   Stable, not currently on any inhalers.                    Cardiac/Vascular     Labile blood pressure - Primary     Overview   - PMH of labile BP  - Followed by Cardiology  - Discontinued fludrocortisone and anti-hypertensive medications  - PRN hydralazine for SBP >180mmHg

## 2025-07-29 ENCOUNTER — TELEPHONE (OUTPATIENT)
Dept: PRIMARY CARE CLINIC | Facility: CLINIC | Age: 80
End: 2025-07-29
Payer: MEDICARE

## 2025-07-29 NOTE — TELEPHONE ENCOUNTER
Call from son Heladio who reported therapist from  would not perform therapy due to  elevated /100. [facility] parameters are lower at 160/90.     Son requesting new parameters for diastolic if that can be done.

## 2025-07-30 ENCOUNTER — DOCUMENTATION ONLY (OUTPATIENT)
Dept: PRIMARY CARE CLINIC | Facility: CLINIC | Age: 80
End: 2025-07-30
Payer: MEDICARE

## 2025-07-30 NOTE — TELEPHONE ENCOUNTER
Discussed parameters for BP and prn med dose. Provider does not want to changed BP parameters for HH staff.     Provider reported family could give prn dose of Hydralazine  if diastolic >100.     Call to son and discussed BP med orders and parameters, he verbalized understanding and agreed.

## 2025-07-31 ENCOUNTER — PATIENT MESSAGE (OUTPATIENT)
Dept: PRIMARY CARE CLINIC | Facility: CLINIC | Age: 80
End: 2025-07-31
Payer: MEDICARE

## 2025-08-06 ENCOUNTER — PATIENT MESSAGE (OUTPATIENT)
Dept: PRIMARY CARE CLINIC | Facility: CLINIC | Age: 80
End: 2025-08-06
Payer: MEDICARE

## 2025-08-15 ENCOUNTER — DOCUMENTATION ONLY (OUTPATIENT)
Dept: PRIMARY CARE CLINIC | Facility: CLINIC | Age: 80
End: 2025-08-15
Payer: MEDICARE

## 2025-08-18 ENCOUNTER — PATIENT MESSAGE (OUTPATIENT)
Dept: PRIMARY CARE CLINIC | Facility: CLINIC | Age: 80
End: 2025-08-18
Payer: MEDICARE